# Patient Record
Sex: MALE | Race: WHITE | NOT HISPANIC OR LATINO | Employment: OTHER | URBAN - METROPOLITAN AREA
[De-identification: names, ages, dates, MRNs, and addresses within clinical notes are randomized per-mention and may not be internally consistent; named-entity substitution may affect disease eponyms.]

---

## 2020-12-29 ENCOUNTER — OFFICE VISIT (OUTPATIENT)
Dept: FAMILY MEDICINE | Facility: CLINIC | Age: 75
End: 2020-12-29
Payer: COMMERCIAL

## 2020-12-29 VITALS
TEMPERATURE: 97.9 F | SYSTOLIC BLOOD PRESSURE: 170 MMHG | HEIGHT: 72 IN | WEIGHT: 215 LBS | DIASTOLIC BLOOD PRESSURE: 98 MMHG | BODY MASS INDEX: 29.12 KG/M2 | OXYGEN SATURATION: 94 % | HEART RATE: 144 BPM

## 2020-12-29 DIAGNOSIS — Z13.6 SCREENING FOR AAA (ABDOMINAL AORTIC ANEURYSM): ICD-10-CM

## 2020-12-29 DIAGNOSIS — Z12.5 SCREENING FOR PROSTATE CANCER: ICD-10-CM

## 2020-12-29 DIAGNOSIS — Z51.81 MEDICATION MONITORING ENCOUNTER: ICD-10-CM

## 2020-12-29 DIAGNOSIS — Z11.59 NEED FOR HEPATITIS C SCREENING TEST: ICD-10-CM

## 2020-12-29 DIAGNOSIS — E78.5 HYPERLIPIDEMIA LDL GOAL <130: ICD-10-CM

## 2020-12-29 DIAGNOSIS — Z00.00 ROUTINE GENERAL MEDICAL EXAMINATION AT A HEALTH CARE FACILITY: ICD-10-CM

## 2020-12-29 DIAGNOSIS — Z13.220 SCREENING FOR HYPERLIPIDEMIA: ICD-10-CM

## 2020-12-29 DIAGNOSIS — Z12.11 SCREEN FOR COLON CANCER: ICD-10-CM

## 2020-12-29 DIAGNOSIS — L40.9 PSORIASIS: ICD-10-CM

## 2020-12-29 DIAGNOSIS — Z01.818 PREOP GENERAL PHYSICAL EXAM: Primary | ICD-10-CM

## 2020-12-29 DIAGNOSIS — I10 HYPERTENSION GOAL BP (BLOOD PRESSURE) < 140/90: ICD-10-CM

## 2020-12-29 LAB
ALBUMIN UR-MCNC: NEGATIVE MG/DL
APPEARANCE UR: CLEAR
BILIRUB UR QL STRIP: NEGATIVE
COLOR UR AUTO: YELLOW
ERYTHROCYTE [DISTWIDTH] IN BLOOD BY AUTOMATED COUNT: 13.3 % (ref 10–15)
GLUCOSE UR STRIP-MCNC: NEGATIVE MG/DL
HBA1C MFR BLD: 5.9 % (ref 0–5.6)
HCT VFR BLD AUTO: 53.5 % (ref 40–53)
HGB BLD-MCNC: 17.7 G/DL (ref 13.3–17.7)
HGB UR QL STRIP: NEGATIVE
KETONES UR STRIP-MCNC: NEGATIVE MG/DL
LEUKOCYTE ESTERASE UR QL STRIP: NEGATIVE
MCH RBC QN AUTO: 30.7 PG (ref 26.5–33)
MCHC RBC AUTO-ENTMCNC: 33.1 G/DL (ref 31.5–36.5)
MCV RBC AUTO: 93 FL (ref 78–100)
NITRATE UR QL: NEGATIVE
PH UR STRIP: 5.5 PH (ref 5–7)
PLATELET # BLD AUTO: 252 10E9/L (ref 150–450)
RBC # BLD AUTO: 5.77 10E12/L (ref 4.4–5.9)
SOURCE: NORMAL
SP GR UR STRIP: >1.03 (ref 1–1.03)
UROBILINOGEN UR STRIP-ACNC: 0.2 EU/DL (ref 0.2–1)
WBC # BLD AUTO: 8.1 10E9/L (ref 4–11)

## 2020-12-29 PROCEDURE — 82043 UR ALBUMIN QUANTITATIVE: CPT | Performed by: FAMILY MEDICINE

## 2020-12-29 PROCEDURE — 83036 HEMOGLOBIN GLYCOSYLATED A1C: CPT | Performed by: FAMILY MEDICINE

## 2020-12-29 PROCEDURE — 81003 URINALYSIS AUTO W/O SCOPE: CPT | Performed by: FAMILY MEDICINE

## 2020-12-29 PROCEDURE — 80053 COMPREHEN METABOLIC PANEL: CPT | Performed by: FAMILY MEDICINE

## 2020-12-29 PROCEDURE — 82550 ASSAY OF CK (CPK): CPT | Performed by: FAMILY MEDICINE

## 2020-12-29 PROCEDURE — G0103 PSA SCREENING: HCPCS | Performed by: FAMILY MEDICINE

## 2020-12-29 PROCEDURE — 93000 ELECTROCARDIOGRAM COMPLETE: CPT | Performed by: FAMILY MEDICINE

## 2020-12-29 PROCEDURE — 84443 ASSAY THYROID STIM HORMONE: CPT | Performed by: FAMILY MEDICINE

## 2020-12-29 PROCEDURE — 80061 LIPID PANEL: CPT | Performed by: FAMILY MEDICINE

## 2020-12-29 PROCEDURE — 99204 OFFICE O/P NEW MOD 45 MIN: CPT | Performed by: FAMILY MEDICINE

## 2020-12-29 PROCEDURE — 85027 COMPLETE CBC AUTOMATED: CPT | Performed by: FAMILY MEDICINE

## 2020-12-29 PROCEDURE — 36415 COLL VENOUS BLD VENIPUNCTURE: CPT | Performed by: FAMILY MEDICINE

## 2020-12-29 PROCEDURE — 86803 HEPATITIS C AB TEST: CPT | Performed by: FAMILY MEDICINE

## 2020-12-29 RX ORDER — SODIUM PHOSPHATE,MONO-DIBASIC 19G-7G/118
1 ENEMA (ML) RECTAL DAILY
COMMUNITY

## 2020-12-29 RX ORDER — LISINOPRIL AND HYDROCHLOROTHIAZIDE 12.5; 2 MG/1; MG/1
1 TABLET ORAL DAILY
Qty: 30 TABLET | Refills: 1 | Status: SHIPPED | OUTPATIENT
Start: 2020-12-29 | End: 2020-12-29

## 2020-12-29 RX ORDER — TRIAMCINOLONE ACETONIDE 5 MG/G
OINTMENT TOPICAL 2 TIMES DAILY
COMMUNITY
End: 2021-03-03

## 2020-12-29 RX ORDER — FENOFIBRATE 160 MG/1
TABLET ORAL
COMMUNITY
Start: 2020-05-08 | End: 2020-12-29

## 2020-12-29 RX ORDER — ATORVASTATIN CALCIUM 40 MG/1
40 TABLET, FILM COATED ORAL DAILY
Qty: 90 TABLET | Refills: 3 | Status: SHIPPED | OUTPATIENT
Start: 2020-12-29 | End: 2021-03-03

## 2020-12-29 RX ORDER — LISINOPRIL AND HYDROCHLOROTHIAZIDE 12.5; 2 MG/1; MG/1
1 TABLET ORAL DAILY
Qty: 90 TABLET | Refills: 3 | Status: SHIPPED | OUTPATIENT
Start: 2020-12-29 | End: 2021-01-21

## 2020-12-29 RX ORDER — FENOFIBRATE 160 MG/1
160 TABLET ORAL DAILY
Qty: 90 TABLET | Refills: 3 | Status: SHIPPED | OUTPATIENT
Start: 2020-12-29 | End: 2021-03-03

## 2020-12-29 RX ORDER — LISINOPRIL 20 MG/1
TABLET ORAL
COMMUNITY
Start: 2020-05-08 | End: 2020-12-29

## 2020-12-29 RX ORDER — ATORVASTATIN CALCIUM 40 MG/1
TABLET, FILM COATED ORAL
COMMUNITY
Start: 2020-04-12 | End: 2020-12-29

## 2020-12-29 SDOH — ECONOMIC STABILITY: FOOD INSECURITY: WITHIN THE PAST 12 MONTHS, THE FOOD YOU BOUGHT JUST DIDN'T LAST AND YOU DIDN'T HAVE MONEY TO GET MORE.: NOT ASKED

## 2020-12-29 SDOH — HEALTH STABILITY: MENTAL HEALTH: HOW OFTEN DO YOU HAVE A DRINK CONTAINING ALCOHOL?: NOT ASKED

## 2020-12-29 SDOH — ECONOMIC STABILITY: FOOD INSECURITY: WITHIN THE PAST 12 MONTHS, YOU WORRIED THAT YOUR FOOD WOULD RUN OUT BEFORE YOU GOT MONEY TO BUY MORE.: NOT ASKED

## 2020-12-29 SDOH — ECONOMIC STABILITY: TRANSPORTATION INSECURITY
IN THE PAST 12 MONTHS, HAS THE LACK OF TRANSPORTATION KEPT YOU FROM MEDICAL APPOINTMENTS OR FROM GETTING MEDICATIONS?: NOT ASKED

## 2020-12-29 SDOH — HEALTH STABILITY: MENTAL HEALTH: HOW OFTEN DO YOU HAVE 6 OR MORE DRINKS ON ONE OCCASION?: NOT ASKED

## 2020-12-29 SDOH — ECONOMIC STABILITY: INCOME INSECURITY: HOW HARD IS IT FOR YOU TO PAY FOR THE VERY BASICS LIKE FOOD, HOUSING, MEDICAL CARE, AND HEATING?: NOT ASKED

## 2020-12-29 SDOH — ECONOMIC STABILITY: TRANSPORTATION INSECURITY
IN THE PAST 12 MONTHS, HAS LACK OF TRANSPORTATION KEPT YOU FROM MEETINGS, WORK, OR FROM GETTING THINGS NEEDED FOR DAILY LIVING?: NOT ASKED

## 2020-12-29 SDOH — HEALTH STABILITY: MENTAL HEALTH: HOW MANY STANDARD DRINKS CONTAINING ALCOHOL DO YOU HAVE ON A TYPICAL DAY?: NOT ASKED

## 2020-12-29 ASSESSMENT — MIFFLIN-ST. JEOR: SCORE: 1753.23

## 2020-12-29 NOTE — PROGRESS NOTES
[address] [address]  Phone: [phone]  Primary Provider: Jalen Daly  Pre-op Performing Provider: JALEN DALY    PREOPERATIVE EVALUATION:  Today's date: 12/29/2020    Terry Verduzco is a 75 year old male who presents for a preoperative evaluation.    Surgical Information:  Surgery/Procedure: left Vitrectomy  Surgery Location:   Surgeon: [facility]  Surgery Date: 1/4/21  Time of Surgery: 6am  Where patient plans to recover: At home with family  Fax number for surgical facility: [phone]    Type of Anesthesia Anticipated: Local with MAC    Subjective     HPI related to upcoming procedure:     Left eye distorted vision with pin hole shaped no vision    Preop Questions 12/29/2020   1. Have you ever had a heart attack or stroke? No   2. Have you ever had surgery on your heart or blood vessels, such as a stent placement, a coronary artery bypass, or surgery on an artery in your head, neck, heart, or legs? No   3. Do you have chest pain with activity? No   4. Do you have a history of  heart failure? No   5. Do you currently have a cold, bronchitis or symptoms of other infection? No   6. Do you have a cough, shortness of breath, or wheezing? No   7. Do you or anyone in your family have previous history of blood clots? No   8. Do you or does anyone in your family have a serious bleeding problem such as prolonged bleeding following surgeries or cuts? No   9. Have you ever had problems with anemia or been told to take iron pills? No   10. Have you had any abnormal blood loss such as black, tarry or bloody stools? No   11. Have you ever had a blood transfusion? No   12. Are you willing to have a blood transfusion if it is medically needed before, during, or after your surgery? Yes   13. Have you or any of your relatives ever had problems with anesthesia? No   14. Do you have sleep apnea, excessive snoring or daytime drowsiness? No   15.  Do you have any artifical heart valves or other implanted medical devices like a pacemaker, defibrillator, or continuous glucose monitor? No   16. Do you have artificial joints? No   17. Are you allergic to latex? No       Health Care Directive:  Patient does not have a Health Care Directive or Living Will: pending    Preoperative Review of :   reviewed - no concerns      Htn    BP Readings from Last 3 Encounters:   12/29/20 (!) 170/98     Out of meds for 3 weeks    Lipids    Pending    Psoriasis     Controlled      Review of Systems  CONSTITUTIONAL: NEGATIVE for fever, chills, change in weight  INTEGUMENTARY/SKIN: NEGATIVE for worrisome rashes, moles or lesions  EYES: NEGATIVE for vision changes or irritation  ENT/MOUTH: NEGATIVE for ear, mouth and throat problems  RESP: NEGATIVE for significant cough or SOB  CV: NEGATIVE for chest pain, palpitations or peripheral edema  GI: NEGATIVE for nausea, abdominal pain, heartburn, or change in bowel habits  : NEGATIVE for frequency, dysuria, or hematuria  MUSCULOSKELETAL: NEGATIVE for significant arthralgias or myalgia  NEURO: NEGATIVE for weakness, dizziness or paresthesias  ENDOCRINE: NEGATIVE for temperature intolerance, skin/hair changes  HEME: NEGATIVE for bleeding problems  PSYCHIATRIC: NEGATIVE for changes in mood or affect    Patient Active Problem List    Diagnosis Date Noted     Hypertension goal BP (blood pressure) < 140/90      Priority: Medium     Psoriasis      Priority: Medium     Hyperlipidemia LDL goal <130      Priority: Medium      Past Medical History:   Diagnosis Date     Hyperlipidemia LDL goal <130      Hypertension goal BP (blood pressure) < 140/90      Psoriasis      Past Surgical History:   Procedure Laterality Date     SURGICAL HISTORY OF -  Right 1982    right inguinal hernia     SURGICAL HISTORY OF -   1985    cholecystectomy     SURGICAL HISTORY OF -  Left 1964    left testicle removed secondary to injury     Current Outpatient  Medications   Medication Sig Dispense Refill     atorvastatin (LIPITOR) 40 MG tablet Take 1 tablet (40 mg) by mouth daily 90 tablet 3     cholecalciferol (VITAMIN D3) 25 mcg (1000 units) capsule Take 1 capsule by mouth daily       fenofibrate (TRIGLIDE/LOFIBRA) 160 MG tablet Take 1 tablet (160 mg) by mouth daily 90 tablet 3     glucosamine-chondroitin 500-400 MG CAPS per capsule Take 1 capsule by mouth daily       lisinopril-hydrochlorothiazide (ZESTORETIC) 20-12.5 MG tablet Take 1 tablet by mouth daily 90 tablet 3     triamcinolone (KENALOG) 0.5 % external ointment Apply topically 2 times daily         Allergies   Allergen Reactions     Codeine      Tachycardio     Demerol [Meperidine] Rash        Social History     Tobacco Use     Smoking status: Never Smoker     Smokeless tobacco: Never Used   Substance Use Topics     Alcohol use: Yes     Alcohol/week: 1.0 - 2.0 standard drinks     Types: 1 - 2 Standard drinks or equivalent per week     Comment: 1 per week     Family History   Problem Relation Age of Onset     Hypertension Mother      Parkinsonism Father      Parkinsonism Brother      Dementia Maternal Grandfather      Cancer Brother         eye cancer, blastomycosis     Cerebrovascular Disease Brother         bike accident in MVA     History   Drug Use Unknown         Objective     BP (!) 170/98   Pulse 144   Temp 97.9  F (36.6  C) (Tympanic)   Ht 1.829 m (6')   Wt 97.5 kg (215 lb)   SpO2 94%   BMI 29.16 kg/m      Physical Exam    GENERAL APPEARANCE: healthy, alert and no distress     EYES: EOMI,  PERRL     HENT: ear canals and TM's normal and nose and mouth without ulcers or lesions     NECK: no adenopathy, no asymmetry, masses, or scars and thyroid normal to palpation     RESP: lungs clear to auscultation - no rales, rhonchi or wheezes     CV: regular rates and rhythm, normal S1 S2, no S3 or S4 and no murmur, click or rub     ABDOMEN:  soft, nontender, no HSM or masses and bowel sounds normal     MS:  extremities normal- no gross deformities noted, no evidence of inflammation in joints, FROM in all extremities.     SKIN: no suspicious lesions or rashes     NEURO: Normal strength and tone, sensory exam grossly normal, mentation intact and speech normal     PSYCH: mentation appears normal. and affect normal/bright     LYMPHATICS: No cervical adenopathy    No results for input(s): HGB, PLT, INR, NA, POTASSIUM, CR, A1C in the last 40245 hours.     Diagnostics:  Labs pending at this time.  Results will be reviewed when available.   EKG: Normal Sinus Rhythm, sinus tachycardia, short PA, slow R wave, normal axis, normal intervals, no acute ST/T changes c/w ischemia, no LVH by voltage criteria,  No SX    Revised Cardiac Risk Index (RCRI):  The patient has the following serious cardiovascular risks for perioperative complications:   - No serious cardiac risks = 0 points     RCRI Interpretation: 0 points: Class I (very low risk - 0.4% complication rate)           Assessment & Plan   The proposed surgical procedure is considered LOW risk.      ICD-10-CM    1. Preop general physical exam  Z01.818 REVIEW OF HEALTH MAINTENANCE PROTOCOL ORDERS     EKG 12-lead complete w/read - Clinics     CBC with platelets     Comprehensive metabolic panel     *UA reflex to Microscopic   2. Hypertension goal BP (blood pressure) < 140/90  I10 REVIEW OF HEALTH MAINTENANCE PROTOCOL ORDERS     Albumin Random Urine Quantitative with Creat Ratio     CBC with platelets     Comprehensive metabolic panel     lisinopril-hydrochlorothiazide (ZESTORETIC) 20-12.5 MG tablet     DISCONTINUED: lisinopril-hydrochlorothiazide (ZESTORETIC) 20-12.5 MG tablet   3. Hyperlipidemia LDL goal <130  E78.5 REVIEW OF HEALTH MAINTENANCE PROTOCOL ORDERS     Lipid panel reflex to direct LDL Fasting     Comprehensive metabolic panel     CK total     atorvastatin (LIPITOR) 40 MG tablet     fenofibrate (TRIGLIDE/LOFIBRA) 160 MG tablet   4. Psoriasis  L40.9 REVIEW OF HEALTH  MAINTENANCE PROTOCOL ORDERS   5. Medication monitoring encounter  Z51.81 REVIEW OF HEALTH MAINTENANCE PROTOCOL ORDERS     Lipid panel reflex to direct LDL Fasting     Albumin Random Urine Quantitative with Creat Ratio     Hemoglobin A1c     CBC with platelets     Comprehensive metabolic panel     *UA reflex to Microscopic     CK total     TSH with free T4 reflex   6. Routine general medical examination at a health care facility  Z00.00 REVIEW OF HEALTH MAINTENANCE PROTOCOL ORDERS     Lipid panel reflex to direct LDL Fasting     Albumin Random Urine Quantitative with Creat Ratio     Hemoglobin A1c     CBC with platelets     Comprehensive metabolic panel     *UA reflex to Microscopic     CK total     TSH with free T4 reflex   7. Screen for colon cancer  Z12.11 REVIEW OF HEALTH MAINTENANCE PROTOCOL ORDERS     Fecal colorectal cancer screen FIT - Future (S+30)     Fecal colorectal cancer screen FIT   8. Need for hepatitis C screening test  Z11.59 REVIEW OF HEALTH MAINTENANCE PROTOCOL ORDERS     Hepatitis C Screen Reflex to HCV RNA Quant and Genotype   9. Screening for hyperlipidemia  Z13.220 REVIEW OF HEALTH MAINTENANCE PROTOCOL ORDERS   10. Screening for AAA (abdominal aortic aneurysm)  Z13.6 REVIEW OF HEALTH MAINTENANCE PROTOCOL ORDERS     Abdominal Aortic Aneurysm Screening/Tracking   11. Screening for prostate cancer  Z12.5 REVIEW OF HEALTH MAINTENANCE PROTOCOL ORDERS     Prostate spec antigen screen     meds refilled  Recheck BP/ECG in 2 days  If improved, ok for surgery       Risks and Recommendations:  The patient has the following additional risks and recommendations for perioperative complications:   - No identified additional risk factors other than previously addressed    Medication Instructions:  Patient is to take all scheduled medications on the day of surgery    RECOMMENDATION:  APPROVAL GIVEN to proceed with proposed procedure, without further diagnostic evaluation.           Jalen Daly MD, FAAFP     M  [facility]  [address] [place] [id number]  [email or web address]   Office: [phone]  Fax: [phone]  Pager: [phone]         Copy of this evaluation report is provided to requesting physician.    Amparoop Robert    M [facility] Preop Guidelines    Revised Cardiac Risk Index

## 2020-12-30 DIAGNOSIS — Z12.11 SCREEN FOR COLON CANCER: ICD-10-CM

## 2020-12-30 LAB
ALBUMIN SERPL-MCNC: 3.9 G/DL (ref 3.4–5)
ALP SERPL-CCNC: 119 U/L (ref 40–150)
ALT SERPL W P-5'-P-CCNC: 51 U/L (ref 0–70)
ANION GAP SERPL CALCULATED.3IONS-SCNC: 5 MMOL/L (ref 3–14)
AST SERPL W P-5'-P-CCNC: 38 U/L (ref 0–45)
BILIRUB SERPL-MCNC: 2.2 MG/DL (ref 0.2–1.3)
BUN SERPL-MCNC: 25 MG/DL (ref 7–30)
CALCIUM SERPL-MCNC: 9.4 MG/DL (ref 8.5–10.1)
CHLORIDE SERPL-SCNC: 109 MMOL/L (ref 94–109)
CHOLEST SERPL-MCNC: 180 MG/DL
CK SERPL-CCNC: 92 U/L (ref 30–300)
CO2 SERPL-SCNC: 25 MMOL/L (ref 20–32)
CREAT SERPL-MCNC: 1.34 MG/DL (ref 0.66–1.25)
CREAT UR-MCNC: 331 MG/DL
GFR SERPL CREATININE-BSD FRML MDRD: 52 ML/MIN/{1.73_M2}
GLUCOSE SERPL-MCNC: 126 MG/DL (ref 70–99)
HCV AB SERPL QL IA: NONREACTIVE
HDLC SERPL-MCNC: 37 MG/DL
LDLC SERPL CALC-MCNC: 92 MG/DL
MICROALBUMIN UR-MCNC: 18 MG/L
MICROALBUMIN/CREAT UR: 5.53 MG/G CR (ref 0–17)
NONHDLC SERPL-MCNC: 143 MG/DL
POTASSIUM SERPL-SCNC: 4.2 MMOL/L (ref 3.4–5.3)
PROT SERPL-MCNC: 8 G/DL (ref 6.8–8.8)
PSA SERPL-ACNC: 2.72 UG/L (ref 0–4)
SODIUM SERPL-SCNC: 139 MMOL/L (ref 133–144)
TRIGL SERPL-MCNC: 254 MG/DL
TSH SERPL DL<=0.005 MIU/L-ACNC: 1.56 MU/L (ref 0.4–4)

## 2020-12-30 PROCEDURE — 82274 ASSAY TEST FOR BLOOD FECAL: CPT | Performed by: FAMILY MEDICINE

## 2020-12-31 ENCOUNTER — ALLIED HEALTH/NURSE VISIT (OUTPATIENT)
Dept: NURSING | Facility: CLINIC | Age: 75
End: 2020-12-31
Payer: COMMERCIAL

## 2020-12-31 ENCOUNTER — APPOINTMENT (OUTPATIENT)
Dept: GENERAL RADIOLOGY | Facility: CLINIC | Age: 75
End: 2020-12-31
Payer: COMMERCIAL

## 2020-12-31 VITALS
DIASTOLIC BLOOD PRESSURE: 98 MMHG | RESPIRATION RATE: 16 BRPM | OXYGEN SATURATION: 98 % | SYSTOLIC BLOOD PRESSURE: 155 MMHG | HEART RATE: 103 BPM

## 2020-12-31 DIAGNOSIS — Z01.818 PRE-OP EXAM: ICD-10-CM

## 2020-12-31 DIAGNOSIS — Z01.30 BP CHECK: ICD-10-CM

## 2020-12-31 DIAGNOSIS — I10 HYPERTENSION GOAL BP (BLOOD PRESSURE) < 140/90: Primary | ICD-10-CM

## 2020-12-31 PROCEDURE — 93000 ELECTROCARDIOGRAM COMPLETE: CPT

## 2020-12-31 RX ORDER — METOPROLOL SUCCINATE 50 MG/1
50 TABLET, EXTENDED RELEASE ORAL DAILY
Qty: 30 TABLET | Refills: 1 | Status: SHIPPED | OUTPATIENT
Start: 2020-12-31 | End: 2021-02-21

## 2020-12-31 NOTE — PROGRESS NOTES
Terry Verduzco is being followed for Blood Pressure management.      BP Readings from Last 3 Encounters:   12/31/20 (!) 155/98   12/29/20 (!) 170/98       Wt Readings from Last 2 Encounters:   12/29/20 97.5 kg (215 lb)       Is pulse 55 or greater? - Yes    Pulse Readings from Last 1 Encounters:   12/31/20 103       Current blood pressure medication(s):  Current Outpatient Medications   Medication Sig Dispense Refill     atorvastatin (LIPITOR) 40 MG tablet Take 1 tablet (40 mg) by mouth daily 90 tablet 3     cholecalciferol (VITAMIN D3) 25 mcg (1000 units) capsule Take 1 capsule by mouth daily       fenofibrate (TRIGLIDE/LOFIBRA) 160 MG tablet Take 1 tablet (160 mg) by mouth daily 90 tablet 3     glucosamine-chondroitin 500-400 MG CAPS per capsule Take 1 capsule by mouth daily       lisinopril-hydrochlorothiazide (ZESTORETIC) 20-12.5 MG tablet Take 1 tablet by mouth daily 90 tablet 3     triamcinolone (KENALOG) 0.5 % external ointment Apply topically 2 times daily            1. Follow up instructions include:     Next Provider visit: Follow up in 3 months..      SUBJECTIVE:                                                    The patient is taking medication as prescribed and is tolerating well.   Patient is not monitoring Blood Pressure at home.       Pt returning for BP follow up. Pt needing recheck of EKG as well.    Out of the following complicating factors: Cough, Headache, Lightheadedness, Shortness of breath, Fatigue, Nausea, Sexual Dysfunction, New onset of swelling or edema, Weakness and New onset of Chest Pain, the patient reports:  None    OBJECTIVE:                                                      Today's BP completed using cuff size: regular on left side  arm.      Potassium   Date Value Ref Range Status   12/29/2020 4.2 3.4 - 5.3 mmol/L Final     Creatinine   Date Value Ref Range Status   12/29/2020 1.34 (H) 0.66 - 1.25 mg/dL Final     Urea Nitrogen   Date Value Ref Range Status   12/29/2020 25 7 -  30 mg/dL Final     GFR Estimate   Date Value Ref Range Status   12/29/2020 52 (L) >60 mL/min/[1.73_m2] Final     Comment:     Non  GFR Calc  Starting 12/18/2018, serum creatinine based estimated GFR (eGFR) will be   calculated using the Chronic Kidney Disease Epidemiology Collaboration   (CKD-EPI) equation.       Pt was NOT WNL. Pt was still tachycardic. Apical Pulse taken at 103 BPM irregular irregular. Writer noted a few abnormal beats during the 60 seconds of auscultation.   BP was still hypertensive though is better than previous.   EKG was abnormal.    Routing to PCP to review and advise.        Education:  general discussion/verbal explanation  Ways to help improve BP/HTN:   Medications  Lose weight  Diet low in fat and rich in fruits, vegetables and low fat dairy products  Reduce salt in diet  Do something active for at least 30 minutes a day on most days of the week  Cut down on alcohol (if you drink more than 2 drinks per day)  Decrease stress (exercise, read, yoga, meditation, time for self, etc.)   Patient was given an opportunity to ask questions.    Patient verbalized understanding of this plan and is agreeable.    Juan F Shaikh RN

## 2020-12-31 NOTE — PROGRESS NOTES
Message handled by Nurse Triage with [facility] - provider name: Dr. Daly - advise metoprolol succinate 50mg daily #30 x 1 refill. Recheck BP in 1 week. OK for surgery. Fax EKG to surgery center.    Called patient @ [phone] -     Advised of PCP recommendation - Patient stated an understanding and agreed with plan.  Rx sent    EKG faxed      Suzanna Bang RN  [facility]

## 2020-12-31 NOTE — PROGRESS NOTES
Reviewed with Sharee ABARCA    Metoprolol succinate 50 mg daily, #30, r x 1  Recheck BP in 1 week  Fax ECG to [facility], improved, but still mild tachycardia    BP Readings from Last 3 Encounters:   12/31/20 (!) 155/98   12/29/20 (!) 170/98

## 2021-01-01 PROBLEM — R73.03 PREDIABETES: Status: ACTIVE | Noted: 2021-01-01

## 2021-01-01 LAB — HEMOCCULT STL QL IA: NEGATIVE

## 2021-01-11 ENCOUNTER — ALLIED HEALTH/NURSE VISIT (OUTPATIENT)
Dept: NURSING | Facility: CLINIC | Age: 76
End: 2021-01-11
Payer: COMMERCIAL

## 2021-01-11 VITALS — DIASTOLIC BLOOD PRESSURE: 88 MMHG | OXYGEN SATURATION: 96 % | SYSTOLIC BLOOD PRESSURE: 130 MMHG | HEART RATE: 99 BPM

## 2021-01-11 DIAGNOSIS — I10 HYPERTENSION GOAL BP (BLOOD PRESSURE) < 140/90: Primary | ICD-10-CM

## 2021-01-11 NOTE — PROGRESS NOTES
Hypertension Follow-up      Do you check your blood pressure regularly outside of the clinic? Yes  - JUST STARTED TODAY     Are you following a low salt diet? Yes    Are your blood pressures ever more than 140 on the top number (systolic) OR more   than 90 on the bottom number (diastolic), for example 140/90? Yes       How many servings of fruits and vegetables do you eat daily?  0-1    On average, how many sweetened beverages do you drink each day (Examples: soda, juice, sweet tea, etc.  Do NOT count diet or artificially sweetened beverages)?   0    How many days per week do you exercise enough to make your heart beat faster? 5    How many minutes a day do you exercise enough to make your heart beat faster? 20 - 29    How many days per week do you miss taking your medication? 0       Denies: CP, SOB, headaches, blurred vision, N/V, numbness or tingling.       BP Readings from Last 3 Encounters:   01/11/21 130/88   12/31/20 (!) 155/98   12/29/20 (!) 170/98         Reviewed diet and exercise with pt     Patient stated an understanding and agreed with plan.    Harini Lafleur RN, BSN  [facility]

## 2021-01-11 NOTE — PROGRESS NOTES
Next 5 appointments (look out 90 days)    Mar 03, 2021  9:30 AM  PHYSICAL with Jalen Daly MD  [facility] ([facility]) [address] [place] [id number] [phone]        Juan F CORRALES RN   [facility] Triage

## 2021-01-11 NOTE — PROGRESS NOTES
Advise follow up visit soon    BP Readings from Last 3 Encounters:   01/11/21 130/88   12/31/20 (!) 155/98   12/29/20 (!) 170/98     Creatinine   Date Value Ref Range Status   12/29/2020 1.34 (H) 0.66 - 1.25 mg/dL Final     GFR Estimate   Date Value Ref Range Status   12/29/2020 52 (L) >60 mL/min/[1.73_m2] Final     Comment:     Non  GFR Calc  Starting 12/18/2018, serum creatinine based estimated GFR (eGFR) will be   calculated using the Chronic Kidney Disease Epidemiology Collaboration   (CKD-EPI) equation.

## 2021-01-20 DIAGNOSIS — I10 HYPERTENSION GOAL BP (BLOOD PRESSURE) < 140/90: ICD-10-CM

## 2021-01-21 RX ORDER — LISINOPRIL AND HYDROCHLOROTHIAZIDE 12.5; 2 MG/1; MG/1
TABLET ORAL
Qty: 30 TABLET | Refills: 1 | Status: SHIPPED | OUTPATIENT
Start: 2021-01-21 | End: 2021-03-03

## 2021-01-21 NOTE — TELEPHONE ENCOUNTER
rx done, advise visit with myself, BP    BP Readings from Last 3 Encounters:   01/11/21 130/88   12/31/20 (!) 155/98   12/29/20 (!) 170/98     Creatinine   Date Value Ref Range Status   12/29/2020 1.34 (H) 0.66 - 1.25 mg/dL Final

## 2021-01-21 NOTE — TELEPHONE ENCOUNTER
Creatinine   Date Value Ref Range Status   12/29/2020 1.34 (H) 0.66 - 1.25 mg/dL Final     Result Note:   Prediabetes, teetering on diabetes  Mildly decreased kidney function  Mildly increased bilirubin   Elevated triglycerides  Low HDL (good cholesterol)     We advise:     Please proceed with surgery     Continue current cares.  Balanced low cholesterol diet.  Regular exercise.     Close follow up      Routing refill request to provider for review/approval because:  Labs out of range:  Creatinine   Do you want a Follow-up lab draw?        Suzanna Bang RN  [facility]

## 2021-01-22 DIAGNOSIS — I10 HYPERTENSION GOAL BP (BLOOD PRESSURE) < 140/90: ICD-10-CM

## 2021-01-25 RX ORDER — METOPROLOL SUCCINATE 50 MG/1
TABLET, EXTENDED RELEASE ORAL
Qty: 30 TABLET | Refills: 1 | OUTPATIENT
Start: 2021-01-25

## 2021-01-25 NOTE — TELEPHONE ENCOUNTER
#30 x 1 refills sent 12/31/20  No pharmacy change      Suzanna Bang RN  [facility]

## 2021-02-20 DIAGNOSIS — I10 HYPERTENSION GOAL BP (BLOOD PRESSURE) < 140/90: ICD-10-CM

## 2021-02-21 RX ORDER — METOPROLOL SUCCINATE 50 MG/1
TABLET, EXTENDED RELEASE ORAL
Qty: 30 TABLET | Refills: 1 | Status: SHIPPED | OUTPATIENT
Start: 2021-02-21 | End: 2021-03-03

## 2021-02-21 NOTE — TELEPHONE ENCOUNTER
BP Readings from Last 3 Encounters:   01/11/21 130/88   12/31/20 (!) 155/98   12/29/20 (!) 170/98     rx done, advise med check

## 2021-03-03 ENCOUNTER — OFFICE VISIT (OUTPATIENT)
Dept: FAMILY MEDICINE | Facility: CLINIC | Age: 76
End: 2021-03-03
Payer: COMMERCIAL

## 2021-03-03 VITALS — SYSTOLIC BLOOD PRESSURE: 128 MMHG | DIASTOLIC BLOOD PRESSURE: 79 MMHG

## 2021-03-03 DIAGNOSIS — Z12.5 SCREENING FOR PROSTATE CANCER: ICD-10-CM

## 2021-03-03 DIAGNOSIS — Z12.11 SPECIAL SCREENING FOR MALIGNANT NEOPLASMS, COLON: ICD-10-CM

## 2021-03-03 DIAGNOSIS — Z51.81 MEDICATION MONITORING ENCOUNTER: ICD-10-CM

## 2021-03-03 DIAGNOSIS — I10 HYPERTENSION GOAL BP (BLOOD PRESSURE) < 140/90: ICD-10-CM

## 2021-03-03 DIAGNOSIS — E78.5 HYPERLIPIDEMIA LDL GOAL <100: ICD-10-CM

## 2021-03-03 DIAGNOSIS — L40.9 PSORIASIS: ICD-10-CM

## 2021-03-03 DIAGNOSIS — Z00.00 ENCOUNTER FOR MEDICARE ANNUAL WELLNESS EXAM: Primary | ICD-10-CM

## 2021-03-03 DIAGNOSIS — R73.03 PREDIABETES: ICD-10-CM

## 2021-03-03 DIAGNOSIS — M15.0 PRIMARY OSTEOARTHRITIS INVOLVING MULTIPLE JOINTS: ICD-10-CM

## 2021-03-03 LAB
ALBUMIN UR-MCNC: NEGATIVE MG/DL
APPEARANCE UR: CLEAR
BILIRUB UR QL STRIP: NEGATIVE
COLOR UR AUTO: YELLOW
ERYTHROCYTE [DISTWIDTH] IN BLOOD BY AUTOMATED COUNT: 12.6 % (ref 10–15)
GLUCOSE UR STRIP-MCNC: NEGATIVE MG/DL
HBA1C MFR BLD: 6 % (ref 0–5.6)
HCT VFR BLD AUTO: 49.3 % (ref 40–53)
HGB BLD-MCNC: 16.4 G/DL (ref 13.3–17.7)
HGB UR QL STRIP: ABNORMAL
KETONES UR STRIP-MCNC: NEGATIVE MG/DL
LEUKOCYTE ESTERASE UR QL STRIP: NEGATIVE
MCH RBC QN AUTO: 31.2 PG (ref 26.5–33)
MCHC RBC AUTO-ENTMCNC: 33.3 G/DL (ref 31.5–36.5)
MCV RBC AUTO: 94 FL (ref 78–100)
NITRATE UR QL: NEGATIVE
PH UR STRIP: 5.5 PH (ref 5–7)
PLATELET # BLD AUTO: 291 10E9/L (ref 150–450)
RBC # BLD AUTO: 5.25 10E12/L (ref 4.4–5.9)
RBC #/AREA URNS AUTO: NORMAL /HPF
SOURCE: ABNORMAL
SP GR UR STRIP: >1.03 (ref 1–1.03)
UROBILINOGEN UR STRIP-ACNC: 0.2 EU/DL (ref 0.2–1)
WBC # BLD AUTO: 8.3 10E9/L (ref 4–11)
WBC #/AREA URNS AUTO: NORMAL /HPF

## 2021-03-03 PROCEDURE — 85027 COMPLETE CBC AUTOMATED: CPT | Performed by: FAMILY MEDICINE

## 2021-03-03 PROCEDURE — 83036 HEMOGLOBIN GLYCOSYLATED A1C: CPT | Performed by: FAMILY MEDICINE

## 2021-03-03 PROCEDURE — G0438 PPPS, INITIAL VISIT: HCPCS | Performed by: FAMILY MEDICINE

## 2021-03-03 PROCEDURE — 82550 ASSAY OF CK (CPK): CPT | Performed by: FAMILY MEDICINE

## 2021-03-03 PROCEDURE — 84443 ASSAY THYROID STIM HORMONE: CPT | Performed by: FAMILY MEDICINE

## 2021-03-03 PROCEDURE — 82043 UR ALBUMIN QUANTITATIVE: CPT | Performed by: FAMILY MEDICINE

## 2021-03-03 PROCEDURE — 80061 LIPID PANEL: CPT | Performed by: FAMILY MEDICINE

## 2021-03-03 PROCEDURE — G0103 PSA SCREENING: HCPCS | Performed by: FAMILY MEDICINE

## 2021-03-03 PROCEDURE — 36415 COLL VENOUS BLD VENIPUNCTURE: CPT | Performed by: FAMILY MEDICINE

## 2021-03-03 PROCEDURE — 81001 URINALYSIS AUTO W/SCOPE: CPT | Performed by: FAMILY MEDICINE

## 2021-03-03 PROCEDURE — 80053 COMPREHEN METABOLIC PANEL: CPT | Performed by: FAMILY MEDICINE

## 2021-03-03 RX ORDER — LISINOPRIL AND HYDROCHLOROTHIAZIDE 12.5; 2 MG/1; MG/1
1 TABLET ORAL DAILY
Qty: 90 TABLET | Refills: 3 | Status: SHIPPED | OUTPATIENT
Start: 2021-03-03 | End: 2022-03-25

## 2021-03-03 RX ORDER — FENOFIBRATE 160 MG/1
160 TABLET ORAL DAILY
Qty: 90 TABLET | Refills: 3 | Status: SHIPPED | OUTPATIENT
Start: 2021-03-03 | End: 2022-03-07

## 2021-03-03 RX ORDER — ATORVASTATIN CALCIUM 40 MG/1
40 TABLET, FILM COATED ORAL DAILY
Qty: 90 TABLET | Refills: 3 | Status: SHIPPED | OUTPATIENT
Start: 2021-03-03 | End: 2022-03-07

## 2021-03-03 RX ORDER — TRIAMCINOLONE ACETONIDE 5 MG/G
OINTMENT TOPICAL
Qty: 120 G | Refills: 3 | Status: SHIPPED | OUTPATIENT
Start: 2021-03-03 | End: 2021-07-06

## 2021-03-03 RX ORDER — METOPROLOL SUCCINATE 50 MG/1
50 TABLET, EXTENDED RELEASE ORAL DAILY
Qty: 90 TABLET | Refills: 3 | Status: SHIPPED | OUTPATIENT
Start: 2021-03-03 | End: 2021-03-16

## 2021-03-03 ASSESSMENT — ACTIVITIES OF DAILY LIVING (ADL): CURRENT_FUNCTION: NO ASSISTANCE NEEDED

## 2021-03-03 NOTE — PROGRESS NOTES
"[facility]    Terry Verduzco is a 75 year old male who presents for Preventive Visit.    Patient has been advised of split billing requirements and indicates understanding: Yes     Are you in the first 12 months of your Medicare coverage?  No    Healthy Habits:    In general, how would you rate your overall health?  Good    Frequency of exercise:  6-7 days/week (walking 1 mile a day)    Do you usually eat at least 4 servings of fruit and vegetables a day, include whole grains    & fiber and avoid regularly eating high fat or \"junk\" foods?  Yes    Taking medications regularly:  Yes    Barriers to taking medications:  None    Medication side effects:  None    Ability to successfully perform activities of daily living:  No assistance needed    Home Safety:  No safety concerns identified    Hearing Impairment:  No hearing concerns    In the past 6 months, have you been bothered by leaking of urine?  No    In general, how would you rate your overall mental or emotional health?  Very good      PHQ-2 Total Score:    Additional concerns today:  No    Do you feel safe in your environment? Yes    Have you ever done Advance Care Planning? (For example, a Health Directive, POLST, or a discussion with a medical provider or your loved ones about your wishes): No, advance care planning information given to patient to review.  Advanced care planning was discussed at today's visit.    Fall risk  Fallen 2 or more times in the past year?: No  Any fall with injury in the past year?: No    Cognitive Screening   1) Repeat 3 items (Leader, Season, Table)    2) Clock draw: NORMAL  3) 3 item recall: Recalls 1 object   Results: NORMAL clock, 1-2 items recalled: COGNITIVE IMPAIRMENT LESS LIKELY    Mini-CogTM Copyright BERTA Welch. Licensed by the author for use in [facility]; reprinted with permission (yakelin@.[email or web address]). All rights reserved.      Do you have sleep apnea, excessive snoring or daytime " drowsiness?: no    Reviewed and updated as needed this visit by clinical staff  Tobacco  Allergies  Meds   Med Hx  Surg Hx  Fam Hx  Soc Hx        Reviewed and updated as needed this visit by Provider                Social History     Tobacco Use     Smoking status: Never Smoker     Smokeless tobacco: Never Used   Substance Use Topics     Alcohol use: Not Currently     If you drink alcohol do you typically have >3 drinks per day or >7 drinks per week? No    Alcohol Use 3/3/2021   Prescreen: >3 drinks/day or >7 drinks/week? No     Current providers sharing in care for this patient include:   Patient Care Team:  Jalen Daly MD as PCP - General (Family Medicine)  Jalen Daly MD as Assigned PCP    The following health maintenance items are reviewed in Epic and correct as of today:  Health Maintenance   Topic Date Due     COVID-19 Vaccine (1 of 2) 06/11/1962     DTAP/TDAP/TD IMMUNIZATION (1 - Tdap) 06/11/1971     ZOSTER IMMUNIZATION (1 of 2) 06/11/1996     Pneumococcal Vaccine: 65+ Years (1 of 1 - PPSV23) 06/11/2011     ANNUAL REVIEW OF HM ORDERS  12/29/2021     FALL RISK ASSESSMENT  12/29/2021     COLORECTAL CANCER SCREENING  12/30/2021     MEDICARE ANNUAL WELLNESS VISIT  03/03/2022     LIPID  03/03/2026     ADVANCE CARE PLANNING  03/03/2026     HEPATITIS C SCREENING  Completed     PHQ-2  Completed     AORTIC ANEURYSM SCREENING (SYSTEM ASSIGNED)  Completed     INFLUENZA VACCINE  Addressed     Pneumococcal Vaccine: Pediatrics (0 to 5 Years) and At-Risk Patients (6 to 64 Years)  Aged Out     IPV IMMUNIZATION  Aged Out     MENINGITIS IMMUNIZATION  Aged Out     HEPATITIS B IMMUNIZATION  Aged Out     Left posterior vitrectomy - Dr Lees - 1/2021 - went well    Prediabetes    Glucose   Date Value Ref Range Status   12/29/2020 126 (H) 70 - 99 mg/dL Final     Comment:     Fasting specimen   12/27/2005 117 (H) 60 - 110 mg/dL Final     Lab Results   Component Value Date    A1C 6.0 03/03/2021    A1C 5.9 12/29/2020     Htn  "- BP's at home - 120's/upper 70's    BP Readings from Last 3 Encounters:   03/03/21 128/79   01/11/21 130/88   12/31/20 (!) 155/98     Creatinine   Date Value Ref Range Status   12/29/2020 1.34 (H) 0.66 - 1.25 mg/dL Final     GFR Estimate   Date Value Ref Range Status   12/29/2020 52 (L) >60 mL/min/[1.73_m2] Final     Comment:     Non  GFR Calc  Starting 12/18/2018, serum creatinine based estimated GFR (eGFR) will be   calculated using the Chronic Kidney Disease Epidemiology Collaboration   (CKD-EPI) equation.       Recent Labs   Lab Test 12/29/20  0947   CHOL 180   HDL 37*   LDL 92   TRIG 254*     OA - \"fine\"    Psoriasis - controlled    COVID - #1, 3/5/2021    Health Maintenance     Colonoscopy:  Due?   FIT:  given              PSA:  pending   DEXA:  NA    Health Maintenance Due   Topic Date Due     COVID-19 Vaccine (1 of 2) 06/11/1962     DTAP/TDAP/TD IMMUNIZATION (1 - Tdap) 06/11/1971     ZOSTER IMMUNIZATION (1 of 2) 06/11/1996     Pneumococcal Vaccine: 65+ Years (1 of 1 - PPSV23) 06/11/2011       Current Problem List    Patient Active Problem List   Diagnosis     Hypertension goal BP (blood pressure) < 140/90     Psoriasis     Hyperlipidemia LDL goal <100     Prediabetes     Primary osteoarthritis involving multiple joints       Past Medical History    Past Medical History:   Diagnosis Date     Hyperlipidemia LDL goal <100      Hypertension goal BP (blood pressure) < 140/90      Prediabetes 01/01/2021     Primary osteoarthritis involving multiple joints      Psoriasis        Past Surgical History    Past Surgical History:   Procedure Laterality Date     SURGICAL HISTORY OF -  Right 1982    right inguinal hernia     SURGICAL HISTORY OF -   1985    cholecystectomy     SURGICAL HISTORY OF -  Left 1964    left testicle removed secondary to injury     SURGICAL HISTORY OF -  Left 01/2021    Left eye - Dr Lees - Macular Hole and Epiretinal Membrane       Current Medications    Current Outpatient " Medications   Medication Sig Dispense Refill     atorvastatin (LIPITOR) 40 MG tablet Take 1 tablet (40 mg) by mouth daily 90 tablet 3     cholecalciferol (VITAMIN D3) 25 mcg (1000 units) capsule Take 1 capsule by mouth daily       fenofibrate (TRIGLIDE/LOFIBRA) 160 MG tablet Take 1 tablet (160 mg) by mouth daily 90 tablet 3     glucosamine-chondroitin 500-400 MG CAPS per capsule Take 1 capsule by mouth daily       lisinopril-hydrochlorothiazide (ZESTORETIC) 20-12.5 MG tablet Take 1 tablet by mouth daily 90 tablet 3     metoprolol succinate ER (TOPROL-XL) 50 MG 24 hr tablet Take 1 tablet (50 mg) by mouth daily 90 tablet 3     triamcinolone (KENALOG) 0.5 % external ointment Apply topically 2 times daily 120 g 3       Allergies    Allergies   Allergen Reactions     Codeine      Tachycardia     Demerol [Meperidine] Rash       Immunizations      There is no immunization history on file for this patient.    Family History    Family History   Problem Relation Age of Onset     Hypertension Mother      Parkinsonism Father      Parkinsonism Brother      Dementia Maternal Grandfather      Cancer Brother         eye cancer, blastomycosis     Cerebrovascular Disease Brother         bike accident in MVA       Social History    Social History     Socioeconomic History     Marital status:      Spouse name: Claritza     Number of children: 5     Years of education: 12     Highest education level: Not on file   Occupational History     Not on file   Social Needs     Financial resource strain: Not on file     Food insecurity     Worry: Not on file     Inability: Not on file     Transportation needs     Medical: Not on file     Non-medical: Not on file   Tobacco Use     Smoking status: Never Smoker     Smokeless tobacco: Never Used   Substance and Sexual Activity     Alcohol use: Not Currently     Drug use: Never     Sexual activity: Yes     Partners: Female   Lifestyle     Physical activity     Days per week: Not on file      Minutes per session: Not on file     Stress: Not on file   Relationships     Social connections     Talks on phone: Not on file     Gets together: Not on file     Attends Taoist service: Not on file     Active member of club or organization: Not on file     Attends meetings of clubs or organizations: Not on file     Relationship status: Not on file     Intimate partner violence     Fear of current or ex partner: Not on file     Emotionally abused: Not on file     Physically abused: Not on file     Forced sexual activity: Not on file   Other Topics Concern     Not on file   Social History Narrative     Not on file       ROS    CONSTITUTIONAL: NEGATIVE for fever, chills, change in weight  INTEGUMENTARY/SKIN: NEGATIVE for worrisome rashes, moles or lesions  EYES: NEGATIVE for vision changes or irritation  ENT/MOUTH: NEGATIVE for ear, mouth and throat problems  RESP: NEGATIVE for significant cough or SOB  BREAST: NEGATIVE for masses, tenderness or discharge  CV: NEGATIVE for chest pain, palpitations or peripheral edema  GI: NEGATIVE for nausea, abdominal pain, heartburn, or change in bowel habits  : NEGATIVE for frequency, dysuria, or hematuria  MUSCULOSKELETAL: NEGATIVE for significant arthralgias or myalgia  NEURO: NEGATIVE for weakness, dizziness or paresthesias  ENDOCRINE: NEGATIVE for temperature intolerance, skin/hair changes  HEME: NEGATIVE for bleeding problems  PSYCHIATRIC: NEGATIVE for changes in mood or affect    OBJECTIVE    /79  Estimated body mass index is 29.16 kg/m  as calculated from the following:    Height as of 12/29/20: 1.829 m (6').    Weight as of 12/29/20: 97.5 kg (215 lb).  EXAM:   GENERAL: healthy, alert and no distress  EYES: Eyes grossly normal to inspection, PERRL and conjunctivae and sclerae normal  HENT: ear canals and TM's normal, nose and mouth without ulcers or lesions  NECK: no adenopathy, no asymmetry, masses, or scars and thyroid normal to palpation  RESP: lungs clear to  auscultation - no rales, rhonchi or wheezes  CV: regular rate and rhythm, normal S1 S2, no S3 or S4, no murmur, click or rub, no peripheral edema and peripheral pulses strong  ABDOMEN: soft, nontender, no hepatosplenomegaly, no masses and bowel sounds normal   (male): no hernias, penis normal without urethral discharge and right testicle normal - left surgically absent  RECTAL: normal sphincter tone, no rectal masses, prostate of normal size for age, smooth, nontender without masses/nodules and prostate 1+ enlarged, nontender  MS: no gross musculoskeletal defects noted, no edema  NEURO: Normal strength and tone, mentation intact and speech normal  PSYCH: mentation appears normal, affect normal/bright  LYMPH: no cervical, supraclavicular, axillary, or inguinal adenopathy    DIAGNOSTICS/PROCEDURES    Pending    ASSESSMENT      ICD-10-CM    1. Encounter for Medicare annual wellness exam  Z00.00 Comprehensive metabolic panel     Lipid panel reflex to direct LDL Fasting     CK total     CBC with platelets     TSH with free T4 reflex     UA reflex to Microscopic and Culture     Albumin Random Urine Quantitative with Creat Ratio     Prostate spec antigen screen     Fecal colorectal cancer screen FIT     Hemoglobin A1c     Urine Microscopic   2. Prediabetes  R73.03 Comprehensive metabolic panel     Lipid panel reflex to direct LDL Fasting     UA reflex to Microscopic and Culture     Albumin Random Urine Quantitative with Creat Ratio     Hemoglobin A1c   3. Hypertension goal BP (blood pressure) < 140/90  I10 Comprehensive metabolic panel     Lipid panel reflex to direct LDL Fasting     UA reflex to Microscopic and Culture     Albumin Random Urine Quantitative with Creat Ratio     lisinopril-hydrochlorothiazide (ZESTORETIC) 20-12.5 MG tablet     metoprolol succinate ER (TOPROL-XL) 50 MG 24 hr tablet   4. Hyperlipidemia LDL goal <100  E78.5 Comprehensive metabolic panel     Lipid panel reflex to direct LDL Fasting     CK  total     atorvastatin (LIPITOR) 40 MG tablet     fenofibrate (TRIGLIDE/LOFIBRA) 160 MG tablet   5. Psoriasis  L40.9 triamcinolone (KENALOG) 0.5 % external ointment   6. Primary osteoarthritis involving multiple joints  M89.49    7. Screening for prostate cancer  Z12.5 Prostate spec antigen screen   8. Special screening for malignant neoplasms, colon  Z12.11 Fecal colorectal cancer screen FIT   9. Medication monitoring encounter  Z51.81 Comprehensive metabolic panel     Lipid panel reflex to direct LDL Fasting     CK total     CBC with platelets     TSH with free T4 reflex     UA reflex to Microscopic and Culture     Albumin Random Urine Quantitative with Creat Ratio     Hemoglobin A1c       PLAN    Discussed treatment/modality options, including risk and benefits, he desires:    advised alcohol consumption 1oz per day or less, advised aspirin 81 mg po daily, advised 1 multivitamin per day, advised calcium 0832-0700 mg/d and Vitamin D 800-1200 IU/d, advised dentist every 6 months, advised diet and exercise, advised opthalmologist every 6 months, advised blood pressure checks monthly prn (nurse only), further health care maintenance, further lab(s), medication refill(s) and observation    All diagnosis above reviewed and noted above, otherwise stable.      See [facility] [employer] orders for further details.      1) meds refilled    2) labs pending    3) get colonoscopy report    4) watch BP - 128/79 - recently at home, better at home    5) getting COVID - declines all other immunzations    Return in about 6 months (around 9/3/2021), or if symptoms worsen or fail to improve, for Annual Wellness Visit, Medication Recheck Visit, Follow Up Chronic.    Health Maintenance Due   Topic Date Due     COVID-19 Vaccine (1 of 2) 06/11/1962     DTAP/TDAP/TD IMMUNIZATION (1 - Tdap) 06/11/1971     ZOSTER IMMUNIZATION (1 of 2) 06/11/1996     Pneumococcal Vaccine: 65+ Years (1 of 1 - PPSV23) 06/11/2011       End of Life Planning:  Patient  currently has an advanced directive: No.  I have verified the patient's ablity to prepare an advanced directive/make health care decisions.  Literature was provided to assist patient in preparing an advanced directive.    COUNSELING    Reviewed preventive health counseling, as reflected in patient instructions    Estimated body mass index is 29.16 kg/m  as calculated from the following:    Height as of 12/29/20: 1.829 m (6').    Weight as of 12/29/20: 97.5 kg (215 lb).         reports that he has never smoked. He has never used smokeless tobacco.      Appropriate preventive services were discussed with this patient, including applicable screening as appropriate for cardiovascular disease, diabetes, osteopenia/osteoporosis, and glaucoma.  As appropriate for age/gender, discussed screening for colorectal cancer, prostate cancer, breast cancer, and cervical cancer. Checklist reviewing preventive services available has been given to the patient.    Reviewed patients plan of care and provided an AVS. The Intermediate Care Plan ( asthma action plan, low back pain action plan, and migraine action plan) for Terry meets the Care Plan requirement. This Care Plan has been established and reviewed with the Patient.         Jalen Daly MD, FAAFP     [facility]  [address] [place] [id number]  [email or web address]   Office: [phone]  Fax: [phone]  Pager: [phone]

## 2021-03-03 NOTE — PATIENT INSTRUCTIONS
Patient Education   Personalized Prevention Plan  You are due for the preventive services outlined below.  Your care team is available to assist you in scheduling these services.  If you have already completed any of these items, please share that information with your care team to update in your medical record.  Health Maintenance Due   Topic Date Due     Discuss Advance Care Planning  1945     COVID-19 Vaccine (1 of 2) 06/11/1962     Diptheria Tetanus Pertussis (DTAP/TDAP/TD) Vaccine (1 - Tdap) 06/11/1971     Zoster (Shingles) Vaccine (1 of 2) 06/11/1996     Annual Wellness Visit  06/11/2011     Pneumococcal Vaccine (1 of 1 - PPSV23) 06/11/2011     PHQ-2  01/01/2021        Patient Education   Personalized Prevention Plan  You are due for the preventive services outlined below.  Your care team is available to assist you in scheduling these services.  If you have already completed any of these items, please share that information with your care team to update in your medical record.  Health Maintenance Due   Topic Date Due     Discuss Advance Care Planning  1945     COVID-19 Vaccine (1 of 2) 06/11/1962     Diptheria Tetanus Pertussis (DTAP/TDAP/TD) Vaccine (1 - Tdap) 06/11/1971     Zoster (Shingles) Vaccine (1 of 2) 06/11/1996     Annual Wellness Visit  06/11/2011     Pneumococcal Vaccine (1 of 1 - PPSV23) 06/11/2011     PHQ-2  01/01/2021

## 2021-03-04 LAB
ALBUMIN SERPL-MCNC: 3.7 G/DL (ref 3.4–5)
ALP SERPL-CCNC: 73 U/L (ref 40–150)
ALT SERPL W P-5'-P-CCNC: 30 U/L (ref 0–70)
ANION GAP SERPL CALCULATED.3IONS-SCNC: 2 MMOL/L (ref 3–14)
AST SERPL W P-5'-P-CCNC: 27 U/L (ref 0–45)
BILIRUB SERPL-MCNC: 0.8 MG/DL (ref 0.2–1.3)
BUN SERPL-MCNC: 32 MG/DL (ref 7–30)
CALCIUM SERPL-MCNC: 10 MG/DL (ref 8.5–10.1)
CHLORIDE SERPL-SCNC: 107 MMOL/L (ref 94–109)
CHOLEST SERPL-MCNC: 241 MG/DL
CK SERPL-CCNC: 127 U/L (ref 30–300)
CO2 SERPL-SCNC: 30 MMOL/L (ref 20–32)
CREAT SERPL-MCNC: 1.45 MG/DL (ref 0.66–1.25)
CREAT UR-MCNC: 165 MG/DL
GFR SERPL CREATININE-BSD FRML MDRD: 47 ML/MIN/{1.73_M2}
GLUCOSE SERPL-MCNC: 127 MG/DL (ref 70–99)
HDLC SERPL-MCNC: 41 MG/DL
LDLC SERPL CALC-MCNC: 163 MG/DL
MICROALBUMIN UR-MCNC: 8 MG/L
MICROALBUMIN/CREAT UR: 4.97 MG/G CR (ref 0–17)
NONHDLC SERPL-MCNC: 200 MG/DL
POTASSIUM SERPL-SCNC: 4.5 MMOL/L (ref 3.4–5.3)
PROT SERPL-MCNC: 8.3 G/DL (ref 6.8–8.8)
PSA SERPL-ACNC: 2.51 UG/L (ref 0–4)
SODIUM SERPL-SCNC: 139 MMOL/L (ref 133–144)
TRIGL SERPL-MCNC: 186 MG/DL
TSH SERPL DL<=0.005 MIU/L-ACNC: 1.3 MU/L (ref 0.4–4)

## 2021-03-05 ENCOUNTER — IMMUNIZATION (OUTPATIENT)
Dept: NURSING | Facility: CLINIC | Age: 76
End: 2021-03-05
Payer: COMMERCIAL

## 2021-03-05 PROCEDURE — 0011A PR COVID VAC MODERNA 100 MCG/0.5 ML IM: CPT

## 2021-03-05 PROCEDURE — 91301 PR COVID VAC MODERNA 100 MCG/0.5 ML IM: CPT

## 2021-03-15 DIAGNOSIS — I10 HYPERTENSION GOAL BP (BLOOD PRESSURE) < 140/90: ICD-10-CM

## 2021-03-16 RX ORDER — METOPROLOL SUCCINATE 50 MG/1
TABLET, EXTENDED RELEASE ORAL
Qty: 90 TABLET | Refills: 3 | Status: SHIPPED | OUTPATIENT
Start: 2021-03-16 | End: 2022-03-07

## 2021-03-22 ENCOUNTER — VIRTUAL VISIT (OUTPATIENT)
Dept: FAMILY MEDICINE | Facility: CLINIC | Age: 76
End: 2021-03-22
Payer: COMMERCIAL

## 2021-03-22 VITALS — SYSTOLIC BLOOD PRESSURE: 110 MMHG | DIASTOLIC BLOOD PRESSURE: 71 MMHG

## 2021-03-22 DIAGNOSIS — Z51.81 MEDICATION MONITORING ENCOUNTER: ICD-10-CM

## 2021-03-22 DIAGNOSIS — I10 HYPERTENSION GOAL BP (BLOOD PRESSURE) < 140/90: ICD-10-CM

## 2021-03-22 DIAGNOSIS — R73.03 PREDIABETES: Primary | ICD-10-CM

## 2021-03-22 DIAGNOSIS — E78.5 HYPERLIPIDEMIA LDL GOAL <100: ICD-10-CM

## 2021-03-22 PROCEDURE — 99213 OFFICE O/P EST LOW 20 MIN: CPT | Mod: TEL | Performed by: FAMILY MEDICINE

## 2021-03-22 NOTE — PROGRESS NOTES
[facility] [place]    Telephone visit  - [phone]    Subjective    Terry Verduzco is a 75 year old male who is being evaluated via a billable telephone visit.      Chief Complaint   Patient presents with     Recheck Medication     Has limited salts and carbs    COVID #1, 3/5/2021, #2 4/2/2021    Prediabetes    Glucose   Date Value Ref Range Status   03/03/2021 127 (H) 70 - 99 mg/dL Final   12/29/2020 126 (H) 70 - 99 mg/dL Final     Comment:     Fasting specimen   12/27/2005 117 (H) 60 - 110 mg/dL Final       Lab Results   Component Value Date    A1C 6.0 03/03/2021    A1C 5.9 12/29/2020     Hyperlipidemia Follow-Up      Are you regularly taking any medication or supplement to lower your cholesterol?   Yes- lipitor    Are you having muscle aches or other side effects that you think could be caused by your cholesterol lowering medication?  No    Recent Labs   Lab Test 03/03/21  1005 12/29/20  0947   CHOL 241* 180   HDL 41 37*   * 92   TRIG 186* 254*     Hypertension Follow-up      Do you check your blood pressure regularly outside of the clinic? Yes 114/76 ,119/78  110/70    Are you following a low salt diet? Yes    Are your blood pressures ever more than 140 on the top number (systolic) OR more   than 90 on the bottom number (diastolic), for example 140/90? No      How many servings of fruits and vegetables do you eat daily?  2-3    On average, how many sweetened beverages do you drink each day (Examples: soda, juice, sweet tea, etc.  Do NOT count diet or artificially sweetened beverages)?   1    How many days per week do you exercise enough to make your heart beat faster? 5    How many minutes a day do you exercise enough to make your heart beat faster? 20 - 29    How many days per week do you miss taking your medication? 0    BP Readings from Last 3 Encounters:   03/22/21 110/71   03/03/21 128/79   01/11/21 130/88     Creatinine   Date Value Ref Range Status   03/03/2021 1.45 (H) 0.66 - 1.25  mg/dL Final     GFR Estimate   Date Value Ref Range Status   03/03/2021 47 (L) >60 mL/min/[1.73_m2] Final     Comment:     Non  GFR Calc  Starting 12/18/2018, serum creatinine based estimated GFR (eGFR) will be   calculated using the Chronic Kidney Disease Epidemiology Collaboration   (CKD-EPI) equation.       [facility]    Past Medical History:   Diagnosis Date     Hyperlipidemia LDL goal <100      Hypertension goal BP (blood pressure) < 140/90      Prediabetes 01/01/2021     Primary osteoarthritis involving multiple joints      Psoriasis        PSH    Past Surgical History:   Procedure Laterality Date     SURGICAL HISTORY OF -  Right 1982    right inguinal hernia     SURGICAL HISTORY OF -   1985    cholecystectomy     SURGICAL HISTORY OF -  Left 1964    left testicle removed secondary to injury     SURGICAL HISTORY OF -  Left 01/2021    Left eye - Dr Lees - Macular Hole and Epiretinal Membrane       Medications    Current Outpatient Medications   Medication Sig Dispense Refill     atorvastatin (LIPITOR) 40 MG tablet Take 1 tablet (40 mg) by mouth daily 90 tablet 3     cholecalciferol (VITAMIN D3) 25 mcg (1000 units) capsule Take 1 capsule by mouth daily       fenofibrate (TRIGLIDE/LOFIBRA) 160 MG tablet Take 1 tablet (160 mg) by mouth daily 90 tablet 3     glucosamine-chondroitin 500-400 MG CAPS per capsule Take 1 capsule by mouth daily       lisinopril-hydrochlorothiazide (ZESTORETIC) 20-12.5 MG tablet Take 1 tablet by mouth daily 90 tablet 3     metoprolol succinate ER (TOPROL-XL) 50 MG 24 hr tablet TAKE 1 TABLET BY MOUTH EVERY DAY 90 tablet 3     triamcinolone (KENALOG) 0.5 % external ointment Apply topically 2 times daily 120 g 3       Allergies    Codeine and Demerol [meperidine]    Family History    Family History   Problem Relation Age of Onset     Hypertension Mother      Parkinsonism Father      Parkinsonism Brother      Dementia Maternal Grandfather      Cancer Brother         eye cancer,  blastomycosis     Cerebrovascular Disease Brother         bike accident in MVA       Social History    Social History     Socioeconomic History     Marital status:      Spouse name: Claritza     Number of children: 5     Years of education: 12     Highest education level: None   Occupational History     None   Social Needs     Financial resource strain: None     Food insecurity     Worry: None     Inability: None     Transportation needs     Medical: None     Non-medical: None   Tobacco Use     Smoking status: Never Smoker     Smokeless tobacco: Never Used   Substance and Sexual Activity     Alcohol use: Not Currently     Drug use: Never     Sexual activity: Yes     Partners: Female   Lifestyle     Physical activity     Days per week: None     Minutes per session: None     Stress: None   Relationships     Social connections     Talks on phone: None     Gets together: None     Attends Yarsanism service: None     Active member of club or organization: None     Attends meetings of clubs or organizations: None     Relationship status: None     Intimate partner violence     Fear of current or ex partner: None     Emotionally abused: None     Physically abused: None     Forced sexual activity: None   Other Topics Concern     None   Social History Narrative     None       Reviewed and updated as needed this visit by Provider                   Review of Systems     CONSTITUTIONAL: NEGATIVE for fever, chills, change in weight  INTEGUMENTARY/SKIN: NEGATIVE for worrisome rashes, moles or lesions  EYES: NEGATIVE for vision changes or irritation  ENT/MOUTH: NEGATIVE for ear, mouth and throat problems  RESP: NEGATIVE for significant cough or SOB  CV: NEGATIVE for chest pain, palpitations or peripheral edema  GI: NEGATIVE for nausea, abdominal pain, heartburn, or change in bowel habits  : NEGATIVE for frequency, dysuria, or hematuria  MUSCULOSKELETAL: NEGATIVE for significant arthralgias or myalgia  NEURO: NEGATIVE for  weakness, dizziness or paresthesias  ENDOCRINE: NEGATIVE for temperature intolerance, skin/hair changes  HEME: NEGATIVE for bleeding problems  PSYCHIATRIC: NEGATIVE for changes in mood or affect    Objective    Reported vitals:  /71      healthy, alert and no distress  Psych: Alert and oriented times 3; coherent speech, normal   rate and volume, able to articulate logical thoughts, able   to abstract reason, no tangential thoughts, no hallucinations   or delusions  His affect is normal     RESP: No cough, no audible wheezing, able to talk in full sentences  Remainder of exam unable to be completed due to telephone visits    Diagnostic Test Results:  Labs reviewed in Epic         Assessment/Plan:      ICD-10-CM    1. Prediabetes  R73.03 Lipid panel reflex to direct LDL Fasting     Hemoglobin A1c     Comprehensive metabolic panel (BMP + Alb, Alk Phos, ALT, AST, Total. Bili, TP)   2. Hypertension goal BP (blood pressure) < 140/90  I10 Comprehensive metabolic panel (BMP + Alb, Alk Phos, ALT, AST, Total. Bili, TP)   3. Hyperlipidemia LDL goal <100  E78.5 Lipid panel reflex to direct LDL Fasting     Comprehensive metabolic panel (BMP + Alb, Alk Phos, ALT, AST, Total. Bili, TP)     CK total   4. Medication monitoring encounter  Z51.81 Lipid panel reflex to direct LDL Fasting     Hemoglobin A1c     Comprehensive metabolic panel (BMP + Alb, Alk Phos, ALT, AST, Total. Bili, TP)     CK total     Low cholesterol, low carb diet  Regular exercise  Fasting labs in 3 months    Return in about 3 months (around 6/22/2021), or if symptoms worsen or fail to improve, for Medication Recheck Visit, Lab Work.    Phone call duration:  14 minutes           Jalen Daly MD, FAAFP     [facility]  [address] [place] [address]  [email or web address]   Office: [phone]  Fax: [phone]  Pager: [phone]

## 2021-04-02 ENCOUNTER — IMMUNIZATION (OUTPATIENT)
Dept: NURSING | Facility: CLINIC | Age: 76
End: 2021-04-02
Attending: INTERNAL MEDICINE
Payer: COMMERCIAL

## 2021-04-02 PROCEDURE — 91301 PR COVID VAC MODERNA 100 MCG/0.5 ML IM: CPT

## 2021-04-02 PROCEDURE — 0012A PR COVID VAC MODERNA 100 MCG/0.5 ML IM: CPT

## 2021-04-06 ENCOUNTER — TELEPHONE (OUTPATIENT)
Dept: FAMILY MEDICINE | Facility: CLINIC | Age: 76
End: 2021-04-06

## 2021-04-06 NOTE — TELEPHONE ENCOUNTER
Reason for Call:  Form, our goal is to have forms completed with 72 hours, however, some forms may require a visit or additional information.    Type of letter, form or note:  Authorization for RX    Who is the form from?: [facility] (if other please explain)    Where did the form come from: form was faxed in    What clinic location was the form placed at?: [place]    Where the form was placed: Dr Daly Box/Folder    What number is listed as a contact on the form?: [phone]           Call taken on 4/6/2021 at 1:08 PM by Zuleyka Rowland

## 2021-07-05 DIAGNOSIS — Z51.81 MEDICATION MONITORING ENCOUNTER: ICD-10-CM

## 2021-07-05 DIAGNOSIS — R73.03 PREDIABETES: ICD-10-CM

## 2021-07-05 DIAGNOSIS — I10 HYPERTENSION GOAL BP (BLOOD PRESSURE) < 140/90: ICD-10-CM

## 2021-07-05 DIAGNOSIS — E78.5 HYPERLIPIDEMIA LDL GOAL <100: ICD-10-CM

## 2021-07-05 LAB — HBA1C MFR BLD: 5.6 % (ref 0–5.6)

## 2021-07-05 PROCEDURE — 80053 COMPREHEN METABOLIC PANEL: CPT | Performed by: FAMILY MEDICINE

## 2021-07-05 PROCEDURE — 36415 COLL VENOUS BLD VENIPUNCTURE: CPT | Performed by: FAMILY MEDICINE

## 2021-07-05 PROCEDURE — 83036 HEMOGLOBIN GLYCOSYLATED A1C: CPT | Performed by: FAMILY MEDICINE

## 2021-07-05 PROCEDURE — 82550 ASSAY OF CK (CPK): CPT | Performed by: FAMILY MEDICINE

## 2021-07-05 PROCEDURE — 80061 LIPID PANEL: CPT | Performed by: FAMILY MEDICINE

## 2021-07-05 NOTE — LETTER
[facility]  [address] [place] [address]  [phone]                    July 7, 2021    Terry Verduzco  [address] [place] [id number]      Dear Terry,    Here is a summary of your recent test results:      -Cholesterol levels are at your goal levels.  ADVISE: continuing your medication, a regular exercise program with at least 150 minutes of aerobic exercise per week, and eating a low saturated fat/low carbohydrate diet.  Also, you should recheck this fasting cholesterol panel in 12 months.   -Liver and gallbladder tests are normal (ALT,AST, Alk phos, bilirubin), kidney function is decreased but stable (Cr, GFR), sodium is normal, potassium is normal, calcium is normal, glucose is elevated and consistent with prediabetes.   -A1C (diabetic test) is elevated and consistent with prediabetes.   -CK (muscle enzyme test) is normal.   Thank you very much for trusting [facility].     Healthy regards,    Precious Antunez PA-C / on behalf of           Jalen Daly M.D.        Results for orders placed or performed in visit on 07/05/21   CK total     Status: None   Result Value Ref Range    CK Total 176 30 - 300 U/L   Comprehensive metabolic panel (BMP + Alb, Alk Phos, ALT, AST, Total. Bili, TP)     Status: Abnormal   Result Value Ref Range    Sodium 136 133 - 144 mmol/L    Potassium 4.3 3.4 - 5.3 mmol/L    Chloride 104 94 - 109 mmol/L    Carbon Dioxide 26 20 - 32 mmol/L    Anion Gap 6 3 - 14 mmol/L    Glucose 102 (H) 70 - 99 mg/dL    Urea Nitrogen 32 (H) 7 - 30 mg/dL    Creatinine 1.56 (H) 0.66 - 1.25 mg/dL    GFR Estimate 43 (L) >60 mL/min/[1.73_m2]    GFR Estimate If Black 50 (L) >60 mL/min/[1.73_m2]    Calcium 9.9 8.5 - 10.1 mg/dL    Bilirubin Total 0.8 0.2 - 1.3 mg/dL    Albumin 3.9 3.4 - 5.0 g/dL    Protein Total 7.7 6.8 - 8.8 g/dL    Alkaline Phosphatase 59 40 - 150 U/L    ALT 36 0 - 70 U/L    AST 35 0 - 45 U/L   Hemoglobin A1c     Status:  None   Result Value Ref Range    Hemoglobin A1C 5.6 0 - 5.6 %   Lipid panel reflex to direct LDL Fasting     Status: Abnormal   Result Value Ref Range    Cholesterol 209 (H) <200 mg/dL    Triglycerides 114 <150 mg/dL    HDL Cholesterol 48 >39 mg/dL    LDL Cholesterol Calculated 138 (H) <100 mg/dL    Non HDL Cholesterol 161 (H) <130 mg/dL

## 2021-07-06 LAB
ALBUMIN SERPL-MCNC: 3.9 G/DL (ref 3.4–5)
ALP SERPL-CCNC: 59 U/L (ref 40–150)
ALT SERPL W P-5'-P-CCNC: 36 U/L (ref 0–70)
ANION GAP SERPL CALCULATED.3IONS-SCNC: 6 MMOL/L (ref 3–14)
AST SERPL W P-5'-P-CCNC: 35 U/L (ref 0–45)
BILIRUB SERPL-MCNC: 0.8 MG/DL (ref 0.2–1.3)
BUN SERPL-MCNC: 32 MG/DL (ref 7–30)
CALCIUM SERPL-MCNC: 9.9 MG/DL (ref 8.5–10.1)
CHLORIDE SERPL-SCNC: 104 MMOL/L (ref 94–109)
CHOLEST SERPL-MCNC: 209 MG/DL
CK SERPL-CCNC: 176 U/L (ref 30–300)
CO2 SERPL-SCNC: 26 MMOL/L (ref 20–32)
CREAT SERPL-MCNC: 1.56 MG/DL (ref 0.66–1.25)
GFR SERPL CREATININE-BSD FRML MDRD: 43 ML/MIN/{1.73_M2}
GLUCOSE SERPL-MCNC: 102 MG/DL (ref 70–99)
HDLC SERPL-MCNC: 48 MG/DL
LDLC SERPL CALC-MCNC: 138 MG/DL
NONHDLC SERPL-MCNC: 161 MG/DL
POTASSIUM SERPL-SCNC: 4.3 MMOL/L (ref 3.4–5.3)
PROT SERPL-MCNC: 7.7 G/DL (ref 6.8–8.8)
SODIUM SERPL-SCNC: 136 MMOL/L (ref 133–144)
TRIGL SERPL-MCNC: 114 MG/DL

## 2021-07-06 NOTE — RESULT ENCOUNTER NOTE
Team: Please mail result letter     Terry  I have reviewed your recent labs. Here are the results:    -Cholesterol levels are at your goal levels.  ADVISE: continuing your medication, a regular exercise program with at least 150 minutes of aerobic exercise per week, and eating a low saturated fat/low carbohydrate diet.  Also, you should recheck this fasting cholesterol panel in 12 months.  -Liver and gallbladder tests are normal (ALT,AST, Alk phos, bilirubin), kidney function is decreased but stable (Cr, GFR), sodium is normal, potassium is normal, calcium is normal, glucose is elevated and consistent with prediabetes.  -A1C (diabetic test) is elevated and consistent with prediabetes.  -CK (muscle enzyme test) is normal.     If you have any questions please do not hesitate to contact our office via phone ([phone]) or MyChart.    Precious Antunez, MS, PA-C (covering for Dr. Daly)  [facility]

## 2021-10-23 ENCOUNTER — HEALTH MAINTENANCE LETTER (OUTPATIENT)
Age: 76
End: 2021-10-23

## 2022-03-24 DIAGNOSIS — I10 HYPERTENSION GOAL BP (BLOOD PRESSURE) < 140/90: ICD-10-CM

## 2022-03-24 DIAGNOSIS — E78.5 HYPERLIPIDEMIA LDL GOAL <100: ICD-10-CM

## 2022-03-24 NOTE — LETTER
[address] [id number] [phone]       March 31, 2022    Terry Verduzco  [address] [place] [id number]    To Terry:     We have been calling you regarding a recent refill request we received for Lipitor, Fenofibrate,Lisinopril-hydrochlorothiazide, and Metoprolol succinate ER    .  Unfortunately, we were unable to reach you.  We are notifying you that you are due for annual wellness and fasting labs prior to your next refill.  You can schedule this appointment via [employer] or by calling the clinic at [phone] Option 1.          Sincerely,        Jalen Daly M.D./crc

## 2022-03-25 RX ORDER — FENOFIBRATE 160 MG/1
TABLET ORAL
Qty: 30 TABLET | Refills: 0 | Status: SHIPPED | OUTPATIENT
Start: 2022-03-25 | End: 2022-04-19

## 2022-03-25 RX ORDER — ATORVASTATIN CALCIUM 40 MG/1
TABLET, FILM COATED ORAL
Qty: 30 TABLET | Refills: 0 | Status: SHIPPED | OUTPATIENT
Start: 2022-03-25 | End: 2022-04-19

## 2022-03-25 RX ORDER — LISINOPRIL AND HYDROCHLOROTHIAZIDE 12.5; 2 MG/1; MG/1
TABLET ORAL
Qty: 90 TABLET | Refills: 3 | Status: SHIPPED | OUTPATIENT
Start: 2022-03-25 | End: 2022-05-25

## 2022-03-25 RX ORDER — METOPROLOL SUCCINATE 50 MG/1
TABLET, EXTENDED RELEASE ORAL
Qty: 30 TABLET | Refills: 0 | Status: SHIPPED | OUTPATIENT
Start: 2022-03-25 | End: 2022-04-19

## 2022-04-09 ENCOUNTER — HEALTH MAINTENANCE LETTER (OUTPATIENT)
Age: 77
End: 2022-04-09

## 2022-05-18 ASSESSMENT — ENCOUNTER SYMPTOMS
DIZZINESS: 0
ABDOMINAL PAIN: 0
MYALGIAS: 0
PARESTHESIAS: 0
SHORTNESS OF BREATH: 0
CONSTIPATION: 0
HEMATOCHEZIA: 0
DIARRHEA: 0
HEADACHES: 0
FREQUENCY: 0
JOINT SWELLING: 0
NAUSEA: 0
NERVOUS/ANXIOUS: 0
WEAKNESS: 0
PALPITATIONS: 0
HEARTBURN: 0
CHILLS: 0
EYE PAIN: 0
FEVER: 0
COUGH: 0
DYSURIA: 0
SORE THROAT: 0
ARTHRALGIAS: 0
HEMATURIA: 0

## 2022-05-18 ASSESSMENT — ACTIVITIES OF DAILY LIVING (ADL): CURRENT_FUNCTION: NO ASSISTANCE NEEDED

## 2022-05-25 ENCOUNTER — OFFICE VISIT (OUTPATIENT)
Dept: FAMILY MEDICINE | Facility: CLINIC | Age: 77
End: 2022-05-25
Payer: COMMERCIAL

## 2022-05-25 VITALS
OXYGEN SATURATION: 97 % | DIASTOLIC BLOOD PRESSURE: 82 MMHG | HEART RATE: 105 BPM | SYSTOLIC BLOOD PRESSURE: 130 MMHG | HEIGHT: 72 IN | BODY MASS INDEX: 26.82 KG/M2 | TEMPERATURE: 98.7 F | WEIGHT: 198 LBS

## 2022-05-25 DIAGNOSIS — I10 HYPERTENSION GOAL BP (BLOOD PRESSURE) < 140/90: ICD-10-CM

## 2022-05-25 DIAGNOSIS — Z12.11 SCREEN FOR COLON CANCER: ICD-10-CM

## 2022-05-25 DIAGNOSIS — Z51.81 MEDICATION MONITORING ENCOUNTER: ICD-10-CM

## 2022-05-25 DIAGNOSIS — Z12.5 SCREENING FOR PROSTATE CANCER: ICD-10-CM

## 2022-05-25 DIAGNOSIS — L40.9 PSORIASIS: ICD-10-CM

## 2022-05-25 DIAGNOSIS — Z00.00 ROUTINE GENERAL MEDICAL EXAMINATION AT A HEALTH CARE FACILITY: Primary | ICD-10-CM

## 2022-05-25 DIAGNOSIS — M15.0 PRIMARY OSTEOARTHRITIS INVOLVING MULTIPLE JOINTS: ICD-10-CM

## 2022-05-25 DIAGNOSIS — R73.03 PREDIABETES: ICD-10-CM

## 2022-05-25 DIAGNOSIS — K40.91 UNILATERAL RECURRENT INGUINAL HERNIA WITHOUT OBSTRUCTION OR GANGRENE: ICD-10-CM

## 2022-05-25 DIAGNOSIS — E78.5 HYPERLIPIDEMIA LDL GOAL <100: ICD-10-CM

## 2022-05-25 LAB
ALBUMIN SERPL-MCNC: 3.8 G/DL (ref 3.4–5)
ALBUMIN UR-MCNC: NEGATIVE MG/DL
ALP SERPL-CCNC: 59 U/L (ref 40–150)
ALT SERPL W P-5'-P-CCNC: 29 U/L (ref 0–70)
ANION GAP SERPL CALCULATED.3IONS-SCNC: 7 MMOL/L (ref 3–14)
APPEARANCE UR: CLEAR
AST SERPL W P-5'-P-CCNC: 31 U/L (ref 0–45)
BILIRUB SERPL-MCNC: 1 MG/DL (ref 0.2–1.3)
BILIRUB UR QL STRIP: NEGATIVE
BUN SERPL-MCNC: 29 MG/DL (ref 7–30)
CALCIUM SERPL-MCNC: 9.5 MG/DL (ref 8.5–10.1)
CHLORIDE BLD-SCNC: 107 MMOL/L (ref 94–109)
CHOLEST SERPL-MCNC: 203 MG/DL
CK SERPL-CCNC: 164 U/L (ref 30–300)
CO2 SERPL-SCNC: 25 MMOL/L (ref 20–32)
COLOR UR AUTO: YELLOW
CREAT SERPL-MCNC: 1.45 MG/DL (ref 0.66–1.25)
CREAT UR-MCNC: 192 MG/DL
ERYTHROCYTE [DISTWIDTH] IN BLOOD BY AUTOMATED COUNT: 12.6 % (ref 10–15)
FASTING STATUS PATIENT QL REPORTED: YES
GFR SERPL CREATININE-BSD FRML MDRD: 50 ML/MIN/1.73M2
GLUCOSE BLD-MCNC: 123 MG/DL (ref 70–99)
GLUCOSE UR STRIP-MCNC: NEGATIVE MG/DL
HBA1C MFR BLD: 5.8 % (ref 0–5.6)
HCT VFR BLD AUTO: 43.9 % (ref 40–53)
HDLC SERPL-MCNC: 45 MG/DL
HGB BLD-MCNC: 14.7 G/DL (ref 13.3–17.7)
HGB UR QL STRIP: NEGATIVE
KETONES UR STRIP-MCNC: NEGATIVE MG/DL
LDLC SERPL CALC-MCNC: 131 MG/DL
LEUKOCYTE ESTERASE UR QL STRIP: NEGATIVE
MCH RBC QN AUTO: 30.4 PG (ref 26.5–33)
MCHC RBC AUTO-ENTMCNC: 33.5 G/DL (ref 31.5–36.5)
MCV RBC AUTO: 91 FL (ref 78–100)
MICROALBUMIN UR-MCNC: 12 MG/L
MICROALBUMIN/CREAT UR: 6.25 MG/G CR (ref 0–17)
NITRATE UR QL: NEGATIVE
NONHDLC SERPL-MCNC: 158 MG/DL
PH UR STRIP: 5.5 [PH] (ref 5–7)
PLATELET # BLD AUTO: 261 10E3/UL (ref 150–450)
POTASSIUM BLD-SCNC: 4.3 MMOL/L (ref 3.4–5.3)
PROT SERPL-MCNC: 7.6 G/DL (ref 6.8–8.8)
PSA SERPL-MCNC: 3.04 UG/L (ref 0–4)
RBC # BLD AUTO: 4.84 10E6/UL (ref 4.4–5.9)
SODIUM SERPL-SCNC: 139 MMOL/L (ref 133–144)
SP GR UR STRIP: 1.02 (ref 1–1.03)
TRIGL SERPL-MCNC: 134 MG/DL
TSH SERPL DL<=0.005 MIU/L-ACNC: 1.28 MU/L (ref 0.4–4)
UROBILINOGEN UR STRIP-ACNC: 0.2 E.U./DL
WBC # BLD AUTO: 6.2 10E3/UL (ref 4–11)

## 2022-05-25 PROCEDURE — 83036 HEMOGLOBIN GLYCOSYLATED A1C: CPT | Performed by: FAMILY MEDICINE

## 2022-05-25 PROCEDURE — G0103 PSA SCREENING: HCPCS | Performed by: FAMILY MEDICINE

## 2022-05-25 PROCEDURE — 80061 LIPID PANEL: CPT | Performed by: FAMILY MEDICINE

## 2022-05-25 PROCEDURE — 85027 COMPLETE CBC AUTOMATED: CPT | Performed by: FAMILY MEDICINE

## 2022-05-25 PROCEDURE — 80053 COMPREHEN METABOLIC PANEL: CPT | Performed by: FAMILY MEDICINE

## 2022-05-25 PROCEDURE — 81003 URINALYSIS AUTO W/O SCOPE: CPT | Performed by: FAMILY MEDICINE

## 2022-05-25 PROCEDURE — 82550 ASSAY OF CK (CPK): CPT | Performed by: FAMILY MEDICINE

## 2022-05-25 PROCEDURE — 82043 UR ALBUMIN QUANTITATIVE: CPT | Performed by: FAMILY MEDICINE

## 2022-05-25 PROCEDURE — 84443 ASSAY THYROID STIM HORMONE: CPT | Performed by: FAMILY MEDICINE

## 2022-05-25 PROCEDURE — 99397 PER PM REEVAL EST PAT 65+ YR: CPT | Performed by: FAMILY MEDICINE

## 2022-05-25 PROCEDURE — 36415 COLL VENOUS BLD VENIPUNCTURE: CPT | Performed by: FAMILY MEDICINE

## 2022-05-25 RX ORDER — FENOFIBRATE 160 MG/1
160 TABLET ORAL DAILY
Qty: 90 TABLET | Refills: 3 | Status: SHIPPED | OUTPATIENT
Start: 2022-05-25 | End: 2022-07-29

## 2022-05-25 RX ORDER — LISINOPRIL AND HYDROCHLOROTHIAZIDE 12.5; 2 MG/1; MG/1
1 TABLET ORAL DAILY
Qty: 90 TABLET | Refills: 3 | Status: ON HOLD | OUTPATIENT
Start: 2022-05-25 | End: 2022-06-25

## 2022-05-25 RX ORDER — ATORVASTATIN CALCIUM 40 MG/1
40 TABLET, FILM COATED ORAL DAILY
Qty: 90 TABLET | Refills: 3 | Status: SHIPPED | OUTPATIENT
Start: 2022-05-25 | End: 2022-05-31

## 2022-05-25 RX ORDER — METOPROLOL SUCCINATE 50 MG/1
50 TABLET, EXTENDED RELEASE ORAL DAILY
Qty: 90 TABLET | Refills: 3 | Status: SHIPPED | OUTPATIENT
Start: 2022-05-25 | End: 2022-11-07

## 2022-05-25 ASSESSMENT — ENCOUNTER SYMPTOMS
DIZZINESS: 0
HEMATURIA: 0
NAUSEA: 0
COUGH: 0
CHILLS: 0
HEARTBURN: 0
HEMATOCHEZIA: 0
ABDOMINAL PAIN: 0
JOINT SWELLING: 0
FREQUENCY: 0
NERVOUS/ANXIOUS: 0
MYALGIAS: 0
HEADACHES: 0
SHORTNESS OF BREATH: 0
PARESTHESIAS: 0
DYSURIA: 0
PALPITATIONS: 0
FEVER: 0
CONSTIPATION: 0
WEAKNESS: 0
SORE THROAT: 0
EYE PAIN: 0
ARTHRALGIAS: 0
DIARRHEA: 0

## 2022-05-25 ASSESSMENT — ACTIVITIES OF DAILY LIVING (ADL): CURRENT_FUNCTION: NO ASSISTANCE NEEDED

## 2022-05-25 NOTE — PROGRESS NOTES
"[facility]    SUBJECTIVE    Terry Verduzco is a 76 year old male who presents for Preventive Visit.    Patient has been advised of split billing requirements and indicates understanding: Yes  Are you in the first 12 months of your Medicare coverage?  No    Healthy Habits:     In general, how would you rate your overall health?  Excellent    Frequency of exercise:  None    Do you usually eat at least 4 servings of fruit and vegetables a day, include whole grains    & fiber and avoid regularly eating high fat or \"junk\" foods?  Yes    Taking medications regularly:  Yes    Ability to successfully perform activities of daily living:  No assistance needed    Home Safety:  No safety concerns identified    Hearing Impairment:  No hearing concerns    In the past 6 months, have you been bothered by leaking of urine?  No    In general, how would you rate your overall mental or emotional health?  Excellent      PHQ-2 Total Score: 0    Additional concerns today:  Yes     Do you feel safe in your environment? Yes    Have you ever done Advance Care Planning? (For example, a Health Directive, POLST, or a discussion with a medical provider or your loved ones about your wishes): No, advance care planning information given to patient to review.  Patient declined advance care planning discussion at this time.    Fall risk  Fallen 2 or more times in the past year?: No  Any fall with injury in the past year?: No    Cognitive Screening   1) Repeat 3 items (Leader, Season, Table)    2) Clock draw: NORMAL  3) 3 item recall: Recalls NO objects   Results: 0 items recalled: PROBABLE COGNITIVE IMPAIRMENT, **INFORM PROVIDER**    Mini-CogTM Copyright BERTA Welch. Licensed by the author for use in [facility]; reprinted with permission (yakelin@.[email or web address]). All rights reserved.      Do you have sleep apnea, excessive snoring or daytime drowsiness?: no    Reviewed and updated as needed this visit by clinical staff   Tobacco  " Allergies  Meds   Med Hx  Surg Hx  Fam Hx  Soc Hx        Reviewed and updated as needed this visit by Provider                   Social History     Tobacco Use     Smoking status: Never Smoker     Smokeless tobacco: Never Used   Substance Use Topics     Alcohol use: Not Currently     If you drink alcohol do you typically have >3 drinks per day or >7 drinks per week? No    Alcohol Use 5/25/2022   Prescreen: >3 drinks/day or >7 drinks/week? -   Prescreen: >3 drinks/day or >7 drinks/week? No     Prediabetes    Glucose   Date Value Ref Range Status   07/05/2021 102 (H) 70 - 99 mg/dL Final   03/03/2021 127 (H) 70 - 99 mg/dL Final   12/29/2020 126 (H) 70 - 99 mg/dL Final     Comment:     Fasting specimen   12/27/2005 117 (H) 60 - 110 mg/dL Final     Lab Results   Component Value Date    A1C 5.8 05/25/2022    A1C 5.6 07/05/2021    A1C 6.0 03/03/2021    A1C 5.9 12/29/2020     Hyperlipidemia Follow-Up      Are you regularly taking any medication or supplement to lower your cholesterol?   Yes- Lipitor    Are you having muscle aches or other side effects that you think could be caused by your cholesterol lowering medication?  No     Recent Labs   Lab Test 07/05/21  1031 03/03/21  1005   CHOL 209* 241*   HDL 48 41   * 163*   TRIG 114 186*     Hypertension Follow-up      Do you check your blood pressure regularly outside of the clinic? Yes     Are you following a low salt diet? Yes    Are your blood pressures ever more than 140 on the top number (systolic) OR more   than 90 on the bottom number (diastolic), for example 140/90? No     BP Readings from Last 3 Encounters:   05/25/22 130/82   03/22/21 110/71   03/03/21 128/79     Creatinine   Date Value Ref Range Status   07/05/2021 1.56 (H) 0.66 - 1.25 mg/dL Final     GFR Estimate   Date Value Ref Range Status   07/05/2021 43 (L) >60 mL/min/[1.73_m2] Final     Comment:     Non  GFR Calc  Starting 12/18/2018, serum creatinine based estimated GFR (eGFR)  will be   calculated using the Chronic Kidney Disease Epidemiology Collaboration   (CKD-EPI) equation.       OA - no issues    Psoriasis - 80% better while in [place]    Current providers sharing in care for this patient include:     Patient Care Team:  Jalen Daly MD as PCP - General (Family Medicine)  Jalen Daly MD as Assigned PCP    The following health maintenance items are reviewed in Epic and correct as of today:  Health Maintenance Due   Topic Date Due     DTAP/TDAP/TD IMMUNIZATION (1 - Tdap) Never done     ZOSTER IMMUNIZATION (1 of 2) Never done     Pneumococcal Vaccine: 65+ Years (1 - PCV) Never done     COLORECTAL CANCER SCREENING  12/30/2021     Review of Systems   Constitutional: Negative for chills and fever.   HENT: Negative for congestion, ear pain, hearing loss and sore throat.    Eyes: Negative for pain and visual disturbance.   Respiratory: Negative for cough and shortness of breath.    Cardiovascular: Negative for chest pain, palpitations and peripheral edema.   Gastrointestinal: Negative for abdominal pain, constipation, diarrhea, heartburn, hematochezia and nausea.   Genitourinary: Negative for dysuria, frequency, genital sores, hematuria, impotence, penile discharge and urgency.   Musculoskeletal: Negative for arthralgias, joint swelling and myalgias.   Skin: Negative for rash.   Neurological: Negative for dizziness, weakness, headaches and paresthesias.   Psychiatric/Behavioral: Negative for mood changes. The patient is not nervous/anxious.       Health Maintenance     Colonoscopy:  Declines, has never done   FIT:  given              PSA:  pending   DEXA:  NA    Health Maintenance Due   Topic Date Due     DTAP/TDAP/TD IMMUNIZATION (1 - Tdap) Never done     ZOSTER IMMUNIZATION (1 of 2) Never done     Pneumococcal Vaccine: 65+ Years (1 - PCV) Never done     COLORECTAL CANCER SCREENING  12/30/2021       Current Problem List    Patient Active Problem List   Diagnosis     Hypertension goal BP (blood  pressure) < 140/90     Psoriasis     Hyperlipidemia LDL goal <100     Prediabetes     Primary osteoarthritis involving multiple joints       Past Medical History    Past Medical History:   Diagnosis Date     Hyperlipidemia LDL goal <100      Hypertension goal BP (blood pressure) < 140/90      Prediabetes 01/01/2021     Primary osteoarthritis involving multiple joints      Psoriasis        Past Surgical History    Past Surgical History:   Procedure Laterality Date     SURGICAL HISTORY OF -  Right 1982    right inguinal hernia     SURGICAL HISTORY OF -   1985    cholecystectomy     SURGICAL HISTORY OF -  Left 1964    left testicle removed secondary to injury     SURGICAL HISTORY OF -  Left 01/2021    Left eye - Dr Lees - Macular Hole and Epiretinal Membrane     SURGICAL HISTORY OF -   1997    Right Inguinal hernia     SURGICAL HISTORY OF -  Left 07/2021    Left IOL - Dr Becerra       Current Medications    Current Outpatient Medications   Medication Sig Dispense Refill     atorvastatin (LIPITOR) 40 MG tablet Take 1 tablet (40 mg) by mouth daily 90 tablet 3     fenofibrate (TRIGLIDE/LOFIBRA) 160 MG tablet Take 1 tablet (160 mg) by mouth daily 90 tablet 3     lisinopril-hydrochlorothiazide (ZESTORETIC) 20-12.5 MG tablet Take 1 tablet by mouth daily 90 tablet 3     metoprolol succinate ER (TOPROL XL) 50 MG 24 hr tablet Take 1 tablet (50 mg) by mouth daily 90 tablet 3     cholecalciferol (VITAMIN D3) 25 mcg (1000 units) capsule Take 1 capsule by mouth daily       glucosamine-chondroitin 500-400 MG CAPS per capsule Take 1 capsule by mouth daily       triamcinolone (KENALOG) 0.5 % external ointment APPLY TOPICALLY TWO TIMES ADAY 120 g 1       Allergies    Allergies   Allergen Reactions     Codeine      Tachycardia     Demerol [Meperidine] Rash       Immunizations    Immunization History   Administered Date(s) Administered     COVID-19,PF,Moderna 03/05/2021, 04/02/2021, 10/29/2021, 04/28/2022     Influenza, Quad, High Dose,  Pf, 65yr+ (Fluzone HD) 10/29/2021       Family History    Family History   Problem Relation Age of Onset     Hypertension Mother      Parkinsonism Father      Parkinsonism Brother      Dementia Maternal Grandfather      Cancer Brother         eye cancer, blastomycosis     Cerebrovascular Disease Brother         bike accident in MVA       Social History    Social History     Socioeconomic History     Marital status:      Spouse name: Claritza     Number of children: 5     Years of education: 12     Highest education level: Not on file   Occupational History     Not on file   Tobacco Use     Smoking status: Never Smoker     Smokeless tobacco: Never Used   Vaping Use     Vaping Use: Never used   Substance and Sexual Activity     Alcohol use: Not Currently     Drug use: Never     Sexual activity: Yes     Partners: Female     Birth control/protection: None   Other Topics Concern     Parent/sibling w/ CABG, MI or angioplasty before 65F 55M? No   Social History Narrative     Not on file     Social Determinants of Health     Financial Resource Strain: Not on file   Food Insecurity: Not on file   Transportation Needs: Not on file   Physical Activity: Not on file   Stress: Not on file   Social Connections: Not on file   Intimate Partner Violence: Not on file   Housing Stability: Not on file       ROS    CONSTITUTIONAL: NEGATIVE for fever, chills, change in weight  INTEGUMENTARY/SKIN: NEGATIVE for worrisome rashes, moles or lesions  EYES: NEGATIVE for vision changes or irritation  ENT/MOUTH: NEGATIVE for ear, mouth and throat problems  RESP: NEGATIVE for significant cough or SOB  CV: NEGATIVE for chest pain, palpitations or peripheral edema  GI: NEGATIVE for nausea, abdominal pain, heartburn, or change in bowel habits  : NEGATIVE for frequency, dysuria, or hematuria  MUSCULOSKELETAL: NEGATIVE for significant arthralgias or myalgia  NEURO: NEGATIVE for weakness, dizziness or paresthesias  ENDOCRINE: NEGATIVE for  temperature intolerance, skin/hair changes  HEME: NEGATIVE for bleeding problems  PSYCHIATRIC: NEGATIVE for changes in mood or affect    OBJECTIVE    /82   Pulse 105   Temp 98.7  F (37.1  C) (Tympanic)   Ht 1.829 m (6')   Wt 89.8 kg (198 lb)   SpO2 97%   BMI 26.85 kg/m      EXAM:    GENERAL: healthy, alert and no distress  EYES: Eyes grossly normal to inspection, PERRL and conjunctivae and sclerae normal  HENT: ear canals and TM's normal, nose and mouth without ulcers or lesions  NECK: no adenopathy, no asymmetry, masses, or scars and thyroid normal to palpation  RESP: lungs clear to auscultation - no rales, rhonchi or wheezes  CV: regular rate and rhythm, normal S1 S2, no S3 or S4, no murmur, click or rub, no peripheral edema and peripheral pulses strong  ABDOMEN: soft, nontender, no hepatosplenomegaly, no masses and bowel sounds normal   (male): right testicle normal without atrophy or masses, left absent, hernia right inguinal hernia and penis normal without urethral discharge  RECTAL: normal sphincter tone, no rectal masses, prostate smooth, nontender without masses/nodules and prostate 2+ enlarged, nontender  MS: no gross musculoskeletal defects noted, no edema  SKIN: no suspicious lesions or rashes  NEURO: Normal strength and tone, mentation intact and speech normal  PSYCH: mentation appears normal, affect normal/bright  LYMPH: no cervical, supraclavicular, axillary, or inguinal adenopathy    DIAGNOSTICS/PROCEDURES    Pending    ASSESSMENT      ICD-10-CM    1. Routine general medical examination at a health care facility  Z00.00 Comprehensive metabolic panel     Lipid panel reflex to direct LDL Fasting     CBC with platelets     CK total     UA reflex to Microscopic and Culture     Albumin Random Urine Quantitative with Creat Ratio     TSH with free T4 reflex     Prostate Specific Antigen Screen     Fecal colorectal cancer screen FIT     Hemoglobin A1c     REVIEW OF HEALTH MAINTENANCE PROTOCOL  ORDERS     Comprehensive metabolic panel     Lipid panel reflex to direct LDL Fasting     CBC with platelets     CK total     UA reflex to Microscopic and Culture     Albumin Random Urine Quantitative with Creat Ratio     TSH with free T4 reflex     Prostate Specific Antigen Screen     Fecal colorectal cancer screen FIT     Hemoglobin A1c   2. Prediabetes  R73.03 Comprehensive metabolic panel     Lipid panel reflex to direct LDL Fasting     UA reflex to Microscopic and Culture     Albumin Random Urine Quantitative with Creat Ratio     TSH with free T4 reflex     Prostate Specific Antigen Screen     Hemoglobin A1c     REVIEW OF HEALTH MAINTENANCE PROTOCOL ORDERS     Comprehensive metabolic panel     Lipid panel reflex to direct LDL Fasting     UA reflex to Microscopic and Culture     Albumin Random Urine Quantitative with Creat Ratio     TSH with free T4 reflex     Prostate Specific Antigen Screen     Hemoglobin A1c     atorvastatin (LIPITOR) 40 MG tablet     lisinopril-hydrochlorothiazide (ZESTORETIC) 20-12.5 MG tablet   3. Hypertension goal BP (blood pressure) < 140/90  I10 Comprehensive metabolic panel     UA reflex to Microscopic and Culture     Albumin Random Urine Quantitative with Creat Ratio     TSH with free T4 reflex     REVIEW OF HEALTH MAINTENANCE PROTOCOL ORDERS     Comprehensive metabolic panel     UA reflex to Microscopic and Culture     Albumin Random Urine Quantitative with Creat Ratio     TSH with free T4 reflex     lisinopril-hydrochlorothiazide (ZESTORETIC) 20-12.5 MG tablet     metoprolol succinate ER (TOPROL XL) 50 MG 24 hr tablet   4. Hyperlipidemia LDL goal <100  E78.5 Comprehensive metabolic panel     Lipid panel reflex to direct LDL Fasting     CK total     REVIEW OF HEALTH MAINTENANCE PROTOCOL ORDERS     Comprehensive metabolic panel     Lipid panel reflex to direct LDL Fasting     CK total     atorvastatin (LIPITOR) 40 MG tablet     fenofibrate (TRIGLIDE/LOFIBRA) 160 MG tablet   5. Primary  osteoarthritis involving multiple joints  M89.49 REVIEW OF HEALTH MAINTENANCE PROTOCOL ORDERS   6. Psoriasis  L40.9 REVIEW OF HEALTH MAINTENANCE PROTOCOL ORDERS   7. Unilateral recurrent inguinal hernia without obstruction or gangrene  K40.91 Adult General Surg Referral   8. Screening for prostate cancer  Z12.5 Prostate Specific Antigen Screen     REVIEW OF HEALTH MAINTENANCE PROTOCOL ORDERS     Prostate Specific Antigen Screen   9. Screen for colon cancer  Z12.11 Fecal colorectal cancer screen FIT     REVIEW OF HEALTH MAINTENANCE PROTOCOL ORDERS     Fecal colorectal cancer screen FIT   10. Medication monitoring encounter  Z51.81 Comprehensive metabolic panel     Lipid panel reflex to direct LDL Fasting     CBC with platelets     CK total     UA reflex to Microscopic and Culture     Albumin Random Urine Quantitative with Creat Ratio     TSH with free T4 reflex     Hemoglobin A1c     REVIEW OF HEALTH MAINTENANCE PROTOCOL ORDERS     Comprehensive metabolic panel     Lipid panel reflex to direct LDL Fasting     CBC with platelets     CK total     UA reflex to Microscopic and Culture     Albumin Random Urine Quantitative with Creat Ratio     TSH with free T4 reflex     Hemoglobin A1c       PLAN    Discussed treatment/modality options, including risk and benefits, he desires:    advised alcohol consumption 1oz per day or less, advised 1 multivitamin per day, advised calcium 4047-3117 mg/d and Vitamin D 800-1200 IU/d, advised dentist every 6 months, advised diet and exercise, advised opthalmologist every 1-2 years, advised self testicular exam q month, further health care maintenance, further lab(s), immunization(s), medication refill(s) and observation    Discussed controversies surrounding PSA. Specifically reviewed 2017 USPSTF findings recommending discussion of PSA testing for men ages 55-69.  Reviewed findings of the  Randomized Study of Screening for Prostate Cancer which showed a 30% reduction in advanced  stage prostate cancer and a 20% reduction in death rate from prostate cancer in this age group. PSA-based screening may prevent up to 2 deaths and up to 3 cases of metastatic disease per 1,000 men screened over 13 years.    We've elected to do PSA this year after discussing these controversies.    All diagnosis above reviewed and noted above, otherwise stable.      See [facility] orders for further details.      1) medications refilled    2) labs pending    3) immunizations reviewed - tdap, shingrix, pneumovax/prevnar,     4) offered colonoscopy, declines    5) surgery consult    Return in about 1 year (around 5/25/2023) for Complete Physical, Medication Recheck Visit, Follow Up Chronic.    Health Maintenance Due   Topic Date Due     DTAP/TDAP/TD IMMUNIZATION (1 - Tdap) Never done     ZOSTER IMMUNIZATION (1 of 2) Never done     Pneumococcal Vaccine: 65+ Years (1 - PCV) Never done     COLORECTAL CANCER SCREENING  12/30/2021       COUNSELING    Reviewed preventive health counseling, as reflected in patient instructions    BP Readings from Last 1 Encounters:   05/25/22 130/82     Estimated body mass index is 26.85 kg/m  as calculated from the following:    Height as of this encounter: 1.829 m (6').    Weight as of this encounter: 89.8 kg (198 lb).           reports that he has never smoked. He has never used smokeless tobacco.      Counseling Resources:    ATP IV Guidelines  Pooled Cohorts Equation Calculator  FRAX Risk Assessment  ICSI Preventive Guidelines  Dietary Guidelines for Americans, 2010  USDA's MyPlate  ASA Prophylaxis  Lung CA Screening           Jalen Daly MD, FAAFP     [facility]  [address] [place] [id number]  [email or web address]   Office: [phone]  Fax: [phone]  Pager: [phone]     Identified Health Risks:

## 2022-05-27 ENCOUNTER — OFFICE VISIT (OUTPATIENT)
Dept: SURGERY | Facility: CLINIC | Age: 77
End: 2022-05-27
Payer: COMMERCIAL

## 2022-05-27 ENCOUNTER — TELEPHONE (OUTPATIENT)
Dept: SURGERY | Facility: CLINIC | Age: 77
End: 2022-05-27

## 2022-05-27 VITALS
SYSTOLIC BLOOD PRESSURE: 128 MMHG | DIASTOLIC BLOOD PRESSURE: 80 MMHG | HEIGHT: 72 IN | OXYGEN SATURATION: 96 % | BODY MASS INDEX: 26.82 KG/M2 | WEIGHT: 198 LBS | RESPIRATION RATE: 16 BRPM | HEART RATE: 90 BPM

## 2022-05-27 DIAGNOSIS — K40.91 RECURRENT RIGHT INGUINAL HERNIA: Primary | ICD-10-CM

## 2022-05-27 PROCEDURE — 99203 OFFICE O/P NEW LOW 30 MIN: CPT | Performed by: SURGERY

## 2022-05-27 NOTE — PROGRESS NOTES
Terry is a 76 year old male who presents for evaluation for inguinal hernia.  Symptoms began over this past winter but may have begun last summer when he and his wife moved houses. This is described as aching and burning and is located in the right groin. It is made worse with activity such as walking or being on his feet for periods of time. The patient has noticed a bulge which has gotten larger in the interim. Pt has had previous abdominal surgery including an open right inguinal hernia repair with mesh at [facility] in the 1980's. He also had an ope cholecystectomy in the 1970's. 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    Constipation No   Dysuria No  Cough No    Pt's chart has been reviewed for PMH, PSH, allergies, medications, social history and family history.    ROS:  Pulm:  No shortness of breath, dyspnea on exertion, cough, or hemoptysis  CV:  negative  ABD:  See chief complaint  :  Negative  All other systems negative/normal    There were no vitals taken for this visit.  Physical exam:  Patient able to get up on table without difficulty.  Abdomen is abdomen is soft without significant tenderness, masses, organomegaly or guarding  bowel sounds are positive and no caput medusa noted. There is a well healed Kocher scar in the RUQ.  Hernia  Left inguinal hernia is not present with valsalva              Right inguinal hernia is present with valsalva, there is a healed scar in the right groin as anticipated              The hernia is reducible              Testicles are normal    Assesment: right inguinal hernia, symptomatic and recurrent    Plan: The nature of hernias, anatomic considerations, indications and approaches to repair were discussed.  Risks of operative intervention were discussed including infection, bleeding, harm to structures including vessels and nerves as well as recurrence, which is  about 5% per decade of life.  Post operative recuperation and activity limitations were reviewed as well.  The patient is interested in pursuing repair and we will assist in scheduling this with him. Given that this is a recurrent hernia after prior open mesh repair, I've recommended laparoscopic robotic assisted repair to reduce additional recurrence risk and cord injury.      Charles Saravia MD  5/27/2022 1:23 PM    Please route or send letter to:  Primary Care Provider (PCP)

## 2022-05-27 NOTE — LETTER
[facility]    [address], [address]  [place], [place] [address]  Phone [phone]  Fax [phone] [address], [address]  [place] [address]  Phone [phone]  Fax [phone]    [email or web address]   Terry Verduzco      You have been scheduled for a COVID-19 testing appointment at [facility].    6/21/2022 at 2:00 PM     The clinic is located at:  [address] [place], [place] [address]    Please wear a mask or face covering to the testing site.      Following your testing, you will be required to self-isolate until your surgery.  If you need a note for your employer due to this, please let us know.

## 2022-05-27 NOTE — LETTER
[facility]    [address], [address]  [place], [place] [address]  Phone [phone]  Fax [phone] [address], [address]  [place] [address]  Phone [phone]  Fax [phone]    [email or web address]   May 27, 2022    Terry Verduzco  [address] [facility] [address]    We realize with scheduling surgery, one of your first questions is, how much will this cost?  Below we have provided you with the information you will need to receive an estimate for your surgery.    You are scheduled for the following procedure:  ROBOTIC ASSISTED RIGHT INGUINAL HERNIA REPAIR WITH MESH         Surgeon:  MADONNA MARQUEZ MD       Physician Assistant:  Yes      Please make sure to have your insurance card available at the time of calling.    Surgeon & Physician Assistant charges and facility charges for [facility], [facility] or [facility]:    Consumer Price Line at [phone]   -  It is important to note that there may be a Physician Assistant assisting with your surgery.  Please be sure to mention this when calling for the estimate.      If you prefer, you can also request a price estimate online by completing the secure form at:  [email or web address]    Facility Charges at [facility], [facility] or [facility]:  [facility] at [phone]  [facility] at [phone]  [facility] at [phone] or [phone]    Anesthesiologist Charges:  [facility] at [phone]    [facility] - Nurse Anesthetist Charges:  [facility] at [phone]

## 2022-05-27 NOTE — LETTER
May 27, 2022    RE: Terry Verduzco, : 1945      Terry is a 76 year old male who presents for evaluation for inguinal hernia.  Symptoms began over this past winter but may have begun last summer when he and his wife moved houses. This is described as aching and burning and is located in the right groin. It is made worse with activity such as walking or being on his feet for periods of time. The patient has noticed a bulge which has gotten larger in the interim. Pt has had previous abdominal surgery including an open right inguinal hernia repair with mesh at the [facility] in the . He also had an ope cholecystectomy in the . 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     Constipation No   Dysuria No  Cough No     Pt's chart has been reviewed for PMH, PSH, allergies, medications, social history and family history.     ROS:  Pulm:  No shortness of breath, dyspnea on exertion, cough, or hemoptysis  CV:  negative  ABD:  See chief complaint  :  Negative  All other systems negative/normal     There were no vitals taken for this visit.  Physical exam:  Patient able to get up on table without difficulty.  Abdomen is abdomen is soft without significant tenderness, masses, organomegaly or guarding  bowel sounds are positive and no caput medusa noted. There is a well healed Kocher scar in the RUQ.  Hernia  Left inguinal hernia is not present with valsalva              Right inguinal hernia is present with valsalva, there is a healed scar in the right groin as anticipated              The hernia is reducible              Testicles are normal     Assesment: right inguinal hernia, symptomatic and recurrent     Plan: The nature of hernias, anatomic considerations, indications and approaches to repair were discussed.  Risks of operative intervention were discussed including infection, bleeding, harm to structures  including vessels and nerves as well as recurrence, which is about 5% per decade of life.  Post operative recuperation and activity limitations were reviewed as well.  The patient is interested in pursuing repair and we will assist in scheduling this with him. Given that this is a recurrent hernia after prior open mesh repair, I've recommended laparoscopic robotic assisted repair to reduce additional recurrence risk and cord injury.        Charles Saravia MD

## 2022-05-28 LAB — HEMOCCULT STL QL IA: POSITIVE

## 2022-05-28 PROCEDURE — 82274 ASSAY TEST FOR BLOOD FECAL: CPT | Performed by: FAMILY MEDICINE

## 2022-05-31 DIAGNOSIS — K40.91 UNILATERAL RECURRENT INGUINAL HERNIA WITHOUT OBSTRUCTION OR GANGRENE: Primary | ICD-10-CM

## 2022-05-31 DIAGNOSIS — R19.5 POSITIVE FIT (FECAL IMMUNOCHEMICAL TEST): ICD-10-CM

## 2022-05-31 DIAGNOSIS — E78.5 HYPERLIPIDEMIA LDL GOAL <100: ICD-10-CM

## 2022-05-31 DIAGNOSIS — R73.03 PREDIABETES: ICD-10-CM

## 2022-05-31 RX ORDER — ATORVASTATIN CALCIUM 80 MG/1
80 TABLET, FILM COATED ORAL DAILY
Qty: 90 TABLET | Refills: 3 | Status: SHIPPED | OUTPATIENT
Start: 2022-05-31 | End: 2022-07-26

## 2022-06-13 ENCOUNTER — OFFICE VISIT (OUTPATIENT)
Dept: FAMILY MEDICINE | Facility: CLINIC | Age: 77
End: 2022-06-13
Payer: COMMERCIAL

## 2022-06-13 VITALS
TEMPERATURE: 97.9 F | DIASTOLIC BLOOD PRESSURE: 70 MMHG | HEART RATE: 107 BPM | WEIGHT: 197.2 LBS | OXYGEN SATURATION: 95 % | RESPIRATION RATE: 16 BRPM | HEIGHT: 72 IN | BODY MASS INDEX: 26.71 KG/M2 | SYSTOLIC BLOOD PRESSURE: 120 MMHG

## 2022-06-13 DIAGNOSIS — K40.91 UNILATERAL RECURRENT INGUINAL HERNIA WITHOUT OBSTRUCTION OR GANGRENE: ICD-10-CM

## 2022-06-13 DIAGNOSIS — I10 HYPERTENSION GOAL BP (BLOOD PRESSURE) < 140/90: ICD-10-CM

## 2022-06-13 DIAGNOSIS — E78.5 HYPERLIPIDEMIA LDL GOAL <100: ICD-10-CM

## 2022-06-13 DIAGNOSIS — R73.03 PREDIABETES: ICD-10-CM

## 2022-06-13 DIAGNOSIS — Z01.818 PREOPERATIVE EXAMINATION: Primary | ICD-10-CM

## 2022-06-13 DIAGNOSIS — Z23 NEED FOR TDAP VACCINATION: ICD-10-CM

## 2022-06-13 PROCEDURE — 90715 TDAP VACCINE 7 YRS/> IM: CPT | Performed by: FAMILY MEDICINE

## 2022-06-13 PROCEDURE — 99214 OFFICE O/P EST MOD 30 MIN: CPT | Mod: 25 | Performed by: FAMILY MEDICINE

## 2022-06-13 PROCEDURE — 90471 IMMUNIZATION ADMIN: CPT | Performed by: FAMILY MEDICINE

## 2022-06-13 PROCEDURE — 93000 ELECTROCARDIOGRAM COMPLETE: CPT | Performed by: FAMILY MEDICINE

## 2022-06-13 NOTE — PATIENT INSTRUCTIONS
Okay to take all medications as prescribed on the day of surgery with the following exception:  Hold lisinopril-hydrochlorothiazide. Resume after surgery.

## 2022-06-13 NOTE — PROGRESS NOTES
[address] [address]  Phone: [phone]  Primary Provider: Jalen Daly  Pre-op Performing Provider: ALVINO ROSS      PREOPERATIVE EVALUATION:  Today's date: 6/13/2022    Terry Verduzco is a 77 year old male who presents for a preoperative evaluation.    Surgical Information:  Surgery/Procedure: Xi Robotic Assisted HERNIORRHAPHY, INGUINAL, LAPAROSCOPIC, WITH MESH - Right  Surgery Location: [facility]  Surgeon: Dr. Charles Elliott  Surgery Date: 6/24/2022  Time of Surgery: 8:15 Am  Where patient plans to recover: At home with family  Fax number for surgical facility: Note does not need to be faxed, will be available electronically in Epic.    Type of Anesthesia Anticipated: General    Assessment & Plan     The proposed surgical procedure is considered INTERMEDIATE risk.    Preoperative examination  - EKG 12-lead complete w/read - Clinics    Unilateral recurrent inguinal hernia without obstruction or gangrene    Need for Tdap vaccination  - TDAP VACCINE (Adacel, Boostrix)    Hypertension goal BP (blood pressure) < 140/90: well controlled.    Hyperlipidemia LDL goal <100    Prediabetes           Risks and Recommendations:  The patient has the following additional risks and recommendations for perioperative complications:   - No identified additional risk factors other than previously addressed    Medication Instructions:   - ACE/ARB: HOLD due to exceptional risk of hypotension during surgery.   - Beta Blockers: Continue taking on the day of surgery.   - Statins: Continue taking on the day of surgery.     RECOMMENDATION:  APPROVAL GIVEN to proceed with proposed procedure, without further diagnostic evaluation.        Subjective     HPI related to upcoming procedure: history of open right inguinal hernia repair in the 1980s. Over the summer, noticed aching and burning in the right groin that is worse     Preop Questions 6/6/2022   1.  Have you ever had a heart attack or stroke? No   2. Have you ever had surgery on your heart or blood vessels, such as a stent placement, a coronary artery bypass, or surgery on an artery in your head, neck, heart, or legs? No   3. Do you have chest pain with activity? No   4. Do you have a history of  heart failure? No   5. Do you currently have a cold, bronchitis or symptoms of other infection? No   6. Do you have a cough, shortness of breath, or wheezing? No   7. Do you or anyone in your family have previous history of blood clots? No   8. Do you or does anyone in your family have a serious bleeding problem such as prolonged bleeding following surgeries or cuts? No   9. Have you ever had problems with anemia or been told to take iron pills? No   10. Have you had any abnormal blood loss such as black, tarry or bloody stools? No   11. Have you ever had a blood transfusion? No   12. Are you willing to have a blood transfusion if it is medically needed before, during, or after your surgery? Yes   13. Have you or any of your relatives ever had problems with anesthesia? No   14. Do you have sleep apnea, excessive snoring or daytime drowsiness? No   15. Do you have any artifical heart valves or other implanted medical devices like a pacemaker, defibrillator, or continuous glucose monitor? No   16. Do you have artificial joints? No   17. Are you allergic to latex? No       Health Care Directive:  Patient does not have a Health Care Directive or Living Will: Discussed advance care planning with patient; however, patient declined at this time.    Preoperative Review of :   reviewed - no record of controlled substances prescribed.      Status of Chronic Conditions:  See problem list for active medical problems.  Problems all longstanding and stable, except as noted/documented.  See ROS for pertinent symptoms related to these conditions.      Review of Systems  CONSTITUTIONAL: NEGATIVE for fever, chills, change in  weight  INTEGUMENTARY/SKIN: NEGATIVE for worrisome rashes, moles or lesions  EYES: NEGATIVE for vision changes or irritation  ENT/MOUTH: NEGATIVE for ear, mouth and throat problems  RESP: NEGATIVE for significant cough or SOB  CV: NEGATIVE for chest pain, palpitations or peripheral edema  GI: NEGATIVE for nausea, abdominal pain, heartburn, or change in bowel habits  : NEGATIVE for frequency, dysuria, or hematuria  MUSCULOSKELETAL: NEGATIVE for significant arthralgias or myalgia  NEURO: NEGATIVE for weakness, dizziness or paresthesias  ENDOCRINE: NEGATIVE for temperature intolerance, skin/hair changes  HEME: NEGATIVE for bleeding problems  PSYCHIATRIC: NEGATIVE for changes in mood or affect    Patient Active Problem List    Diagnosis Date Noted     Primary osteoarthritis involving multiple joints      Priority: Medium     Prediabetes 01/01/2021     Priority: Medium     Hypertension goal BP (blood pressure) < 140/90      Priority: Medium     Psoriasis      Priority: Medium     Hyperlipidemia LDL goal <100      Priority: Medium      Past Medical History:   Diagnosis Date     Hyperlipidemia LDL goal <100      Hypertension goal BP (blood pressure) < 140/90      Prediabetes 01/01/2021     Primary osteoarthritis involving multiple joints      Psoriasis      Past Surgical History:   Procedure Laterality Date     SURGICAL HISTORY OF -  Right 1982    right inguinal hernia     SURGICAL HISTORY OF -   1985    cholecystectomy     SURGICAL HISTORY OF -  Left 1964    left testicle removed secondary to injury     SURGICAL HISTORY OF -  Left 01/2021    Left eye - Dr Lees - Macular Hole and Epiretinal Membrane     SURGICAL HISTORY OF -   1997    Right Inguinal hernia     SURGICAL HISTORY OF -  Left 07/2021    Left IOL - Dr Becerra     Current Outpatient Medications   Medication Sig Dispense Refill     atorvastatin (LIPITOR) 80 MG tablet Take 1 tablet (80 mg) by mouth daily 90 tablet 3     cholecalciferol (VITAMIN D3) 25 mcg (1000  units) capsule Take 1 capsule by mouth daily       fenofibrate (TRIGLIDE/LOFIBRA) 160 MG tablet Take 1 tablet (160 mg) by mouth daily 90 tablet 3     glucosamine-chondroitin 500-400 MG CAPS per capsule Take 1 capsule by mouth daily       lisinopril-hydrochlorothiazide (ZESTORETIC) 20-12.5 MG tablet Take 1 tablet by mouth daily 90 tablet 3     metoprolol succinate ER (TOPROL XL) 50 MG 24 hr tablet Take 1 tablet (50 mg) by mouth daily 90 tablet 3     triamcinolone (KENALOG) 0.5 % external ointment APPLY TOPICALLY TWO TIMES ADAY 120 g 1       Allergies   Allergen Reactions     Codeine      Tachycardia     Demerol [Meperidine] Rash        Social History     Tobacco Use     Smoking status: Never Smoker     Smokeless tobacco: Never Used   Substance Use Topics     Alcohol use: Not Currently     Family History   Problem Relation Age of Onset     Hypertension Mother      Parkinsonism Father      Parkinsonism Brother      Dementia Maternal Grandfather      Cancer Brother         eye cancer, blastomycosis     Cerebrovascular Disease Brother         bike accident in MVA     History   Drug Use Unknown         Objective     /70   Pulse 107   Temp 97.9  F (36.6  C) (Tympanic)   Resp 16   Ht 1.829 m (6')   Wt 89.4 kg (197 lb 3.2 oz)   SpO2 95%   BMI 26.75 kg/m      Physical Exam    GENERAL APPEARANCE: healthy, alert and no distress     EYES: EOMI,  PERRL     HENT: ear canals and TM's normal and nose and mouth without ulcers or lesions     NECK: no adenopathy, no asymmetry, masses, or scars and thyroid normal to palpation     RESP: lungs clear to auscultation - no rales, rhonchi or wheezes     CV: regular rates and rhythm, normal S1 S2, no S3 or S4 and no murmur, click or rub     MS: extremities normal- no gross deformities noted, no evidence of inflammation in joints, FROM in all extremities.     SKIN: no suspicious lesions or rashes     NEURO: Normal strength and tone, sensory exam grossly normal, mentation intact and  speech normal     PSYCH: mentation appears normal. and affect normal/bright     LYMPHATICS: No cervical adenopathy    Recent Labs   Lab Test 05/25/22  1008 07/05/21  1031 03/03/21  1005   HGB 14.7  --  16.4     --  291    136 139   POTASSIUM 4.3 4.3 4.5   CR 1.45* 1.56* 1.45*   A1C 5.8* 5.6 6.0*        Diagnostics:  Recent Results (from the past 720 hour(s))   Comprehensive metabolic panel    Collection Time: 05/25/22 10:08 AM   Result Value Ref Range    Sodium 139 133 - 144 mmol/L    Potassium 4.3 3.4 - 5.3 mmol/L    Chloride 107 94 - 109 mmol/L    Carbon Dioxide (CO2) 25 20 - 32 mmol/L    Anion Gap 7 3 - 14 mmol/L    Urea Nitrogen 29 7 - 30 mg/dL    Creatinine 1.45 (H) 0.66 - 1.25 mg/dL    Calcium 9.5 8.5 - 10.1 mg/dL    Glucose 123 (H) 70 - 99 mg/dL    Alkaline Phosphatase 59 40 - 150 U/L    AST 31 0 - 45 U/L    ALT 29 0 - 70 U/L    Protein Total 7.6 6.8 - 8.8 g/dL    Albumin 3.8 3.4 - 5.0 g/dL    Bilirubin Total 1.0 0.2 - 1.3 mg/dL    GFR Estimate 50 (L) >60 mL/min/1.73m2   Lipid panel reflex to direct LDL Fasting    Collection Time: 05/25/22 10:08 AM   Result Value Ref Range    Cholesterol 203 (H) <200 mg/dL    Triglycerides 134 <150 mg/dL    Direct Measure HDL 45 >=40 mg/dL    LDL Cholesterol Calculated 131 (H) <=100 mg/dL    Non HDL Cholesterol 158 (H) <130 mg/dL    Patient Fasting > 8hrs? Yes    CBC with platelets    Collection Time: 05/25/22 10:08 AM   Result Value Ref Range    WBC Count 6.2 4.0 - 11.0 10e3/uL    RBC Count 4.84 4.40 - 5.90 10e6/uL    Hemoglobin 14.7 13.3 - 17.7 g/dL    Hematocrit 43.9 40.0 - 53.0 %    MCV 91 78 - 100 fL    MCH 30.4 26.5 - 33.0 pg    MCHC 33.5 31.5 - 36.5 g/dL    RDW 12.6 10.0 - 15.0 %    Platelet Count 261 150 - 450 10e3/uL   CK total    Collection Time: 05/25/22 10:08 AM   Result Value Ref Range     30 - 300 U/L   TSH with free T4 reflex    Collection Time: 05/25/22 10:08 AM   Result Value Ref Range    TSH 1.28 0.40 - 4.00 mU/L   Prostate Specific  Antigen Screen    Collection Time: 05/25/22 10:08 AM   Result Value Ref Range    Prostate Specific Antigen Screen 3.04 0.00 - 4.00 ug/L   Hemoglobin A1c    Collection Time: 05/25/22 10:08 AM   Result Value Ref Range    Hemoglobin A1C 5.8 (H) 0.0 - 5.6 %   Albumin Random Urine Quantitative with Creat Ratio    Collection Time: 05/25/22 10:13 AM   Result Value Ref Range    Creatinine Urine mg/dL 192 mg/dL    Albumin Urine mg/L 12 mg/L    Albumin Urine mg/g Cr 6.25 0.00 - 17.00 mg/g Cr   UA reflex to Microscopic and Culture    Collection Time: 05/25/22 10:14 AM    Specimen: Urine, Midstream   Result Value Ref Range    Color Urine Yellow Colorless, Straw, Light Yellow, Yellow    Appearance Urine Clear Clear    Glucose Urine Negative Negative mg/dL    Bilirubin Urine Negative Negative    Ketones Urine Negative Negative mg/dL    Specific Gravity Urine 1.025 1.003 - 1.035    Blood Urine Negative Negative    pH Urine 5.5 5.0 - 7.0    Protein Albumin Urine Negative Negative mg/dL    Urobilinogen Urine 0.2 0.2, 1.0 E.U./dL    Nitrite Urine Negative Negative    Leukocyte Esterase Urine Negative Negative   Fecal colorectal cancer screen FIT    Collection Time: 05/28/22 12:09 PM   Result Value Ref Range    Occult Blood Screen FIT Positive (A) Negative      EKG: appears normal, NSR, normal axis, normal intervals, no acute ST/T changes c/w ischemia, no LVH by voltage criteria, unchanged from previous tracings    Revised Cardiac Risk Index (RCRI):  The patient has the following serious cardiovascular risks for perioperative complications:   - No serious cardiac risks = 0 points     RCRI Interpretation: 0 points: Class I (very low risk - 0.4% complication rate)           Signed Electronically by: Addy Campos DO  Copy of this evaluation report is provided to requesting physician.

## 2022-06-16 DIAGNOSIS — K40.91 RECURRENT RIGHT INGUINAL HERNIA: Primary | ICD-10-CM

## 2022-06-21 ENCOUNTER — LAB (OUTPATIENT)
Dept: LAB | Facility: CLINIC | Age: 77
End: 2022-06-21
Payer: COMMERCIAL

## 2022-06-21 DIAGNOSIS — K40.91 RECURRENT RIGHT INGUINAL HERNIA: ICD-10-CM

## 2022-06-21 PROCEDURE — U0003 INFECTIOUS AGENT DETECTION BY NUCLEIC ACID (DNA OR RNA); SEVERE ACUTE RESPIRATORY SYNDROME CORONAVIRUS 2 (SARS-COV-2) (CORONAVIRUS DISEASE [COVID-19]), AMPLIFIED PROBE TECHNIQUE, MAKING USE OF HIGH THROUGHPUT TECHNOLOGIES AS DESCRIBED BY CMS-2020-01-R: HCPCS

## 2022-06-21 PROCEDURE — U0005 INFEC AGEN DETEC AMPLI PROBE: HCPCS

## 2022-06-22 LAB — SARS-COV-2 RNA RESP QL NAA+PROBE: NEGATIVE

## 2022-06-24 ENCOUNTER — ANESTHESIA EVENT (OUTPATIENT)
Dept: SURGERY | Facility: CLINIC | Age: 77
End: 2022-06-24
Payer: COMMERCIAL

## 2022-06-24 ENCOUNTER — HOSPITAL ENCOUNTER (OUTPATIENT)
Facility: CLINIC | Age: 77
Setting detail: OBSERVATION
Discharge: HOME | End: 2022-06-25
Attending: EMERGENCY MEDICINE | Admitting: EMERGENCY MEDICINE
Payer: COMMERCIAL

## 2022-06-24 ENCOUNTER — HOSPITAL ENCOUNTER (OUTPATIENT)
Facility: CLINIC | Age: 77
Discharge: HOME | End: 2022-06-24
Attending: SURGERY | Admitting: SURGERY
Payer: COMMERCIAL

## 2022-06-24 ENCOUNTER — NURSE TRIAGE (OUTPATIENT)
Dept: NURSING | Facility: CLINIC | Age: 77
End: 2022-06-24

## 2022-06-24 ENCOUNTER — ANESTHESIA (OUTPATIENT)
Dept: SURGERY | Facility: CLINIC | Age: 77
End: 2022-06-24
Payer: COMMERCIAL

## 2022-06-24 VITALS
HEART RATE: 88 BPM | OXYGEN SATURATION: 94 % | HEIGHT: 72 IN | RESPIRATION RATE: 16 BRPM | BODY MASS INDEX: 26.36 KG/M2 | SYSTOLIC BLOOD PRESSURE: 158 MMHG | TEMPERATURE: 96.9 F | DIASTOLIC BLOOD PRESSURE: 93 MMHG | WEIGHT: 194.6 LBS

## 2022-06-24 DIAGNOSIS — D72.829 LEUKOCYTOSIS, UNSPECIFIED TYPE: ICD-10-CM

## 2022-06-24 DIAGNOSIS — E87.1 HYPONATREMIA: ICD-10-CM

## 2022-06-24 DIAGNOSIS — R73.03 PREDIABETES: ICD-10-CM

## 2022-06-24 DIAGNOSIS — K38.9 DISORDER OF APPENDIX: Primary | ICD-10-CM

## 2022-06-24 DIAGNOSIS — K40.91 RECURRENT RIGHT INGUINAL HERNIA: ICD-10-CM

## 2022-06-24 DIAGNOSIS — I10 HYPERTENSION GOAL BP (BLOOD PRESSURE) < 140/90: ICD-10-CM

## 2022-06-24 DIAGNOSIS — R33.9 URINARY RETENTION: ICD-10-CM

## 2022-06-24 LAB
ALBUMIN UR-MCNC: NEGATIVE MG/DL
ANION GAP SERPL CALCULATED.3IONS-SCNC: 12 MMOL/L (ref 3–14)
APPEARANCE UR: CLEAR
BACTERIA #/AREA URNS HPF: ABNORMAL /HPF
BASOPHILS # BLD AUTO: 0 10E3/UL (ref 0–0.2)
BASOPHILS NFR BLD AUTO: 0 %
BILIRUB UR QL STRIP: NEGATIVE
BUN SERPL-MCNC: 37 MG/DL (ref 7–30)
CALCIUM SERPL-MCNC: 9.2 MG/DL (ref 8.5–10.1)
CHLORIDE BLD-SCNC: 90 MMOL/L (ref 94–109)
CO2 SERPL-SCNC: 22 MMOL/L (ref 20–32)
COLOR UR AUTO: ABNORMAL
CREAT SERPL-MCNC: 1.41 MG/DL (ref 0.66–1.25)
EOSINOPHIL # BLD AUTO: 0 10E3/UL (ref 0–0.7)
EOSINOPHIL NFR BLD AUTO: 0 %
ERYTHROCYTE [DISTWIDTH] IN BLOOD BY AUTOMATED COUNT: 12 % (ref 10–15)
GFR SERPL CREATININE-BSD FRML MDRD: 52 ML/MIN/1.73M2
GLUCOSE BLD-MCNC: 130 MG/DL (ref 70–99)
GLUCOSE BLDC GLUCOMTR-MCNC: 137 MG/DL (ref 70–99)
GLUCOSE UR STRIP-MCNC: NEGATIVE MG/DL
HCT VFR BLD AUTO: 40.8 % (ref 40–53)
HGB BLD-MCNC: 13.9 G/DL (ref 13.3–17.7)
HGB UR QL STRIP: ABNORMAL
IMM GRANULOCYTES # BLD: 0 10E3/UL
IMM GRANULOCYTES NFR BLD: 0 %
KETONES UR STRIP-MCNC: NEGATIVE MG/DL
LEUKOCYTE ESTERASE UR QL STRIP: NEGATIVE
LYMPHOCYTES # BLD AUTO: 1.1 10E3/UL (ref 0.8–5.3)
LYMPHOCYTES NFR BLD AUTO: 9 %
MCH RBC QN AUTO: 31 PG (ref 26.5–33)
MCHC RBC AUTO-ENTMCNC: 34.1 G/DL (ref 31.5–36.5)
MCV RBC AUTO: 91 FL (ref 78–100)
MONOCYTES # BLD AUTO: 0.8 10E3/UL (ref 0–1.3)
MONOCYTES NFR BLD AUTO: 7 %
NEUTROPHILS # BLD AUTO: 10.1 10E3/UL (ref 1.6–8.3)
NEUTROPHILS NFR BLD AUTO: 84 %
NITRATE UR QL: NEGATIVE
NRBC # BLD AUTO: 0 10E3/UL
NRBC BLD AUTO-RTO: 0 /100
PH UR STRIP: 5.5 [PH] (ref 5–7)
PLATELET # BLD AUTO: 200 10E3/UL (ref 150–450)
POTASSIUM BLD-SCNC: 4.9 MMOL/L (ref 3.4–5.3)
RBC # BLD AUTO: 4.49 10E6/UL (ref 4.4–5.9)
RBC URINE: 25 /HPF
SODIUM SERPL-SCNC: 124 MMOL/L (ref 133–144)
SP GR UR STRIP: 1.01 (ref 1–1.03)
UROBILINOGEN UR STRIP-MCNC: NORMAL MG/DL
WBC # BLD AUTO: 12 10E3/UL (ref 4–11)
WBC URINE: 3 /HPF

## 2022-06-24 PROCEDURE — 82310 ASSAY OF CALCIUM: CPT | Performed by: EMERGENCY MEDICINE

## 2022-06-24 PROCEDURE — 258N000003 HC RX IP 258 OP 636: Performed by: ANESTHESIOLOGY

## 2022-06-24 PROCEDURE — 250N000011 HC RX IP 250 OP 636: Performed by: NURSE ANESTHETIST, CERTIFIED REGISTERED

## 2022-06-24 PROCEDURE — 93005 ELECTROCARDIOGRAM TRACING: CPT

## 2022-06-24 PROCEDURE — 84300 ASSAY OF URINE SODIUM: CPT | Performed by: INTERNAL MEDICINE

## 2022-06-24 PROCEDURE — 258N000003 HC RX IP 258 OP 636: Performed by: EMERGENCY MEDICINE

## 2022-06-24 PROCEDURE — 999N000141 HC STATISTIC PRE-PROCEDURE NURSING ASSESSMENT: Performed by: SURGERY

## 2022-06-24 PROCEDURE — 83930 ASSAY OF BLOOD OSMOLALITY: CPT | Performed by: INTERNAL MEDICINE

## 2022-06-24 PROCEDURE — 360N000080 HC SURGERY LEVEL 7, PER MIN: Performed by: SURGERY

## 2022-06-24 PROCEDURE — 250N000011 HC RX IP 250 OP 636: Performed by: SURGERY

## 2022-06-24 PROCEDURE — 250N000013 HC RX MED GY IP 250 OP 250 PS 637: Performed by: SURGERY

## 2022-06-24 PROCEDURE — 96361 HYDRATE IV INFUSION ADD-ON: CPT

## 2022-06-24 PROCEDURE — 82962 GLUCOSE BLOOD TEST: CPT

## 2022-06-24 PROCEDURE — 710N000012 HC RECOVERY PHASE 2, PER MINUTE: Performed by: SURGERY

## 2022-06-24 PROCEDURE — C1781 MESH (IMPLANTABLE): HCPCS | Performed by: SURGERY

## 2022-06-24 PROCEDURE — 96360 HYDRATION IV INFUSION INIT: CPT | Mod: 59

## 2022-06-24 PROCEDURE — 250N000009 HC RX 250: Performed by: SURGERY

## 2022-06-24 PROCEDURE — 370N000017 HC ANESTHESIA TECHNICAL FEE, PER MIN: Performed by: SURGERY

## 2022-06-24 PROCEDURE — C9803 HOPD COVID-19 SPEC COLLECT: HCPCS

## 2022-06-24 PROCEDURE — S2900 ROBOTIC SURGICAL SYSTEM: HCPCS | Performed by: SURGERY

## 2022-06-24 PROCEDURE — 36415 COLL VENOUS BLD VENIPUNCTURE: CPT | Performed by: EMERGENCY MEDICINE

## 2022-06-24 PROCEDURE — 49651 LAP ING HERNIA REPAIR RECUR: CPT | Mod: AS | Performed by: PHYSICIAN ASSISTANT

## 2022-06-24 PROCEDURE — 81001 URINALYSIS AUTO W/SCOPE: CPT | Performed by: EMERGENCY MEDICINE

## 2022-06-24 PROCEDURE — 99285 EMERGENCY DEPT VISIT HI MDM: CPT | Mod: 25

## 2022-06-24 PROCEDURE — 258N000003 HC RX IP 258 OP 636: Performed by: NURSE ANESTHETIST, CERTIFIED REGISTERED

## 2022-06-24 PROCEDURE — 51702 INSERT TEMP BLADDER CATH: CPT

## 2022-06-24 PROCEDURE — 85025 COMPLETE CBC W/AUTO DIFF WBC: CPT | Performed by: EMERGENCY MEDICINE

## 2022-06-24 PROCEDURE — 83935 ASSAY OF URINE OSMOLALITY: CPT | Performed by: INTERNAL MEDICINE

## 2022-06-24 PROCEDURE — 272N000001 HC OR GENERAL SUPPLY STERILE: Performed by: SURGERY

## 2022-06-24 PROCEDURE — 250N000009 HC RX 250: Performed by: NURSE ANESTHETIST, CERTIFIED REGISTERED

## 2022-06-24 PROCEDURE — 710N000009 HC RECOVERY PHASE 1, LEVEL 1, PER MIN: Performed by: SURGERY

## 2022-06-24 PROCEDURE — 49651 LAP ING HERNIA REPAIR RECUR: CPT | Mod: RT | Performed by: SURGERY

## 2022-06-24 DEVICE — MESH PROGRIP LAPAROSCOPIC 5.9X3.9" PARIETEX SELF-FIX LPG1510: Type: IMPLANTABLE DEVICE | Site: INGUINAL | Status: FUNCTIONAL

## 2022-06-24 RX ORDER — LIDOCAINE HYDROCHLORIDE 10 MG/ML
INJECTION, SOLUTION INFILTRATION; PERINEURAL PRN
Status: DISCONTINUED | OUTPATIENT
Start: 2022-06-24 | End: 2022-06-24

## 2022-06-24 RX ORDER — SODIUM CHLORIDE, SODIUM LACTATE, POTASSIUM CHLORIDE, CALCIUM CHLORIDE 600; 310; 30; 20 MG/100ML; MG/100ML; MG/100ML; MG/100ML
INJECTION, SOLUTION INTRAVENOUS CONTINUOUS
Status: DISCONTINUED | OUTPATIENT
Start: 2022-06-24 | End: 2022-06-24 | Stop reason: HOSPADM

## 2022-06-24 RX ORDER — HYDROCODONE BITARTRATE AND ACETAMINOPHEN 5; 325 MG/1; MG/1
1-2 TABLET ORAL EVERY 4 HOURS PRN
Qty: 10 TABLET | Refills: 0 | Status: SHIPPED | OUTPATIENT
Start: 2022-06-24 | End: 2022-07-15

## 2022-06-24 RX ORDER — CEFAZOLIN SODIUM/WATER 2 G/20 ML
2 SYRINGE (ML) INTRAVENOUS SEE ADMIN INSTRUCTIONS
Status: DISCONTINUED | OUTPATIENT
Start: 2022-06-24 | End: 2022-06-24 | Stop reason: HOSPADM

## 2022-06-24 RX ORDER — PROPOFOL 10 MG/ML
INJECTION, EMULSION INTRAVENOUS CONTINUOUS PRN
Status: DISCONTINUED | OUTPATIENT
Start: 2022-06-24 | End: 2022-06-24

## 2022-06-24 RX ORDER — FENTANYL CITRATE 50 UG/ML
25 INJECTION, SOLUTION INTRAMUSCULAR; INTRAVENOUS EVERY 5 MIN PRN
Status: DISCONTINUED | OUTPATIENT
Start: 2022-06-24 | End: 2022-06-24 | Stop reason: HOSPADM

## 2022-06-24 RX ORDER — PROPOFOL 10 MG/ML
INJECTION, EMULSION INTRAVENOUS PRN
Status: DISCONTINUED | OUTPATIENT
Start: 2022-06-24 | End: 2022-06-24

## 2022-06-24 RX ORDER — FENTANYL CITRATE 50 UG/ML
25 INJECTION, SOLUTION INTRAMUSCULAR; INTRAVENOUS
Status: DISCONTINUED | OUTPATIENT
Start: 2022-06-24 | End: 2022-06-24 | Stop reason: HOSPADM

## 2022-06-24 RX ORDER — CEFAZOLIN SODIUM/WATER 2 G/20 ML
2 SYRINGE (ML) INTRAVENOUS
Status: COMPLETED | OUTPATIENT
Start: 2022-06-24 | End: 2022-06-24

## 2022-06-24 RX ORDER — HYDROMORPHONE HCL IN WATER/PF 6 MG/30 ML
0.4 PATIENT CONTROLLED ANALGESIA SYRINGE INTRAVENOUS EVERY 5 MIN PRN
Status: DISCONTINUED | OUTPATIENT
Start: 2022-06-24 | End: 2022-06-24 | Stop reason: HOSPADM

## 2022-06-24 RX ORDER — HYDROCODONE BITARTRATE AND ACETAMINOPHEN 5; 325 MG/1; MG/1
1 TABLET ORAL
Status: COMPLETED | OUTPATIENT
Start: 2022-06-24 | End: 2022-06-24

## 2022-06-24 RX ORDER — FENTANYL CITRATE 50 UG/ML
INJECTION, SOLUTION INTRAMUSCULAR; INTRAVENOUS PRN
Status: DISCONTINUED | OUTPATIENT
Start: 2022-06-24 | End: 2022-06-24

## 2022-06-24 RX ORDER — ONDANSETRON 2 MG/ML
4 INJECTION INTRAMUSCULAR; INTRAVENOUS EVERY 30 MIN PRN
Status: DISCONTINUED | OUTPATIENT
Start: 2022-06-24 | End: 2022-06-24 | Stop reason: HOSPADM

## 2022-06-24 RX ORDER — LABETALOL HYDROCHLORIDE 5 MG/ML
10 INJECTION, SOLUTION INTRAVENOUS EVERY 10 MIN PRN
Status: DISCONTINUED | OUTPATIENT
Start: 2022-06-24 | End: 2022-06-24 | Stop reason: HOSPADM

## 2022-06-24 RX ORDER — GLYCOPYRROLATE 0.2 MG/ML
INJECTION, SOLUTION INTRAMUSCULAR; INTRAVENOUS PRN
Status: DISCONTINUED | OUTPATIENT
Start: 2022-06-24 | End: 2022-06-24

## 2022-06-24 RX ORDER — ONDANSETRON 4 MG/1
4 TABLET, ORALLY DISINTEGRATING ORAL EVERY 30 MIN PRN
Status: DISCONTINUED | OUTPATIENT
Start: 2022-06-24 | End: 2022-06-24 | Stop reason: HOSPADM

## 2022-06-24 RX ORDER — NEOSTIGMINE METHYLSULFATE 1 MG/ML
VIAL (ML) INJECTION PRN
Status: DISCONTINUED | OUTPATIENT
Start: 2022-06-24 | End: 2022-06-24

## 2022-06-24 RX ORDER — ONDANSETRON 2 MG/ML
INJECTION INTRAMUSCULAR; INTRAVENOUS PRN
Status: DISCONTINUED | OUTPATIENT
Start: 2022-06-24 | End: 2022-06-24

## 2022-06-24 RX ORDER — LIDOCAINE 40 MG/G
CREAM TOPICAL
Status: DISCONTINUED | OUTPATIENT
Start: 2022-06-24 | End: 2022-06-24 | Stop reason: HOSPADM

## 2022-06-24 RX ORDER — OXYCODONE HYDROCHLORIDE 5 MG/1
5 TABLET ORAL EVERY 4 HOURS PRN
Status: DISCONTINUED | OUTPATIENT
Start: 2022-06-24 | End: 2022-06-24 | Stop reason: HOSPADM

## 2022-06-24 RX ORDER — DEXAMETHASONE SODIUM PHOSPHATE 4 MG/ML
INJECTION, SOLUTION INTRA-ARTICULAR; INTRALESIONAL; INTRAMUSCULAR; INTRAVENOUS; SOFT TISSUE PRN
Status: DISCONTINUED | OUTPATIENT
Start: 2022-06-24 | End: 2022-06-24

## 2022-06-24 RX ORDER — HYDROCODONE BITARTRATE AND ACETAMINOPHEN 5; 325 MG/1; MG/1
1 TABLET ORAL ONCE
Status: DISCONTINUED | OUTPATIENT
Start: 2022-06-24 | End: 2022-06-25

## 2022-06-24 RX ORDER — KETOROLAC TROMETHAMINE 15 MG/ML
15 INJECTION, SOLUTION INTRAMUSCULAR; INTRAVENOUS
Status: DISCONTINUED | OUTPATIENT
Start: 2022-06-24 | End: 2022-06-24 | Stop reason: HOSPADM

## 2022-06-24 RX ORDER — BUPIVACAINE HYDROCHLORIDE 5 MG/ML
INJECTION, SOLUTION PERINEURAL PRN
Status: DISCONTINUED | OUTPATIENT
Start: 2022-06-24 | End: 2022-06-24 | Stop reason: HOSPADM

## 2022-06-24 RX ADMIN — GLYCOPYRROLATE 0.8 MG: 0.2 INJECTION, SOLUTION INTRAMUSCULAR; INTRAVENOUS at 09:45

## 2022-06-24 RX ADMIN — HYDROCODONE BITARTRATE AND ACETAMINOPHEN 1 TABLET: 5; 325 TABLET ORAL at 10:31

## 2022-06-24 RX ADMIN — PHENYLEPHRINE HYDROCHLORIDE 100 MCG: 10 INJECTION INTRAVENOUS at 09:11

## 2022-06-24 RX ADMIN — PHENYLEPHRINE HYDROCHLORIDE 100 MCG: 10 INJECTION INTRAVENOUS at 08:52

## 2022-06-24 RX ADMIN — NEOSTIGMINE METHYLSULFATE 4 MG: 1 INJECTION, SOLUTION INTRAVENOUS at 09:45

## 2022-06-24 RX ADMIN — HYDROMORPHONE HYDROCHLORIDE 1 MG: 1 INJECTION, SOLUTION INTRAMUSCULAR; INTRAVENOUS; SUBCUTANEOUS at 08:59

## 2022-06-24 RX ADMIN — PHENYLEPHRINE HYDROCHLORIDE 200 MCG: 10 INJECTION INTRAVENOUS at 08:28

## 2022-06-24 RX ADMIN — Medication 2 G: at 08:10

## 2022-06-24 RX ADMIN — FENTANYL CITRATE 50 MCG: 50 INJECTION, SOLUTION INTRAMUSCULAR; INTRAVENOUS at 08:16

## 2022-06-24 RX ADMIN — SODIUM CHLORIDE 1000 ML: 9 INJECTION, SOLUTION INTRAVENOUS at 23:18

## 2022-06-24 RX ADMIN — SODIUM CHLORIDE, POTASSIUM CHLORIDE, SODIUM LACTATE AND CALCIUM CHLORIDE: 600; 310; 30; 20 INJECTION, SOLUTION INTRAVENOUS at 09:04

## 2022-06-24 RX ADMIN — ROCURONIUM BROMIDE 50 MG: 50 INJECTION, SOLUTION INTRAVENOUS at 08:17

## 2022-06-24 RX ADMIN — PROPOFOL 50 MCG/KG/MIN: 10 INJECTION, EMULSION INTRAVENOUS at 08:22

## 2022-06-24 RX ADMIN — PHENYLEPHRINE HYDROCHLORIDE 100 MCG: 10 INJECTION INTRAVENOUS at 08:25

## 2022-06-24 RX ADMIN — LIDOCAINE HYDROCHLORIDE 50 MG: 10 INJECTION, SOLUTION INFILTRATION; PERINEURAL at 08:16

## 2022-06-24 RX ADMIN — ONDANSETRON HYDROCHLORIDE 4 MG: 2 INJECTION, SOLUTION INTRAVENOUS at 09:45

## 2022-06-24 RX ADMIN — FENTANYL CITRATE 50 MCG: 50 INJECTION, SOLUTION INTRAMUSCULAR; INTRAVENOUS at 08:35

## 2022-06-24 RX ADMIN — DEXAMETHASONE SODIUM PHOSPHATE 8 MG: 4 INJECTION, SOLUTION INTRA-ARTICULAR; INTRALESIONAL; INTRAMUSCULAR; INTRAVENOUS; SOFT TISSUE at 08:17

## 2022-06-24 RX ADMIN — SODIUM CHLORIDE, POTASSIUM CHLORIDE, SODIUM LACTATE AND CALCIUM CHLORIDE: 600; 310; 30; 20 INJECTION, SOLUTION INTRAVENOUS at 07:04

## 2022-06-24 RX ADMIN — PROPOFOL 150 MG: 10 INJECTION, EMULSION INTRAVENOUS at 08:16

## 2022-06-24 RX ADMIN — PHENYLEPHRINE HYDROCHLORIDE 100 MCG: 10 INJECTION INTRAVENOUS at 09:51

## 2022-06-24 RX ADMIN — PHENYLEPHRINE HYDROCHLORIDE 100 MCG: 10 INJECTION INTRAVENOUS at 09:28

## 2022-06-24 ASSESSMENT — ENCOUNTER SYMPTOMS
ABDOMINAL PAIN: 1
CHILLS: 0
DIFFICULTY URINATING: 1
DIARRHEA: 0
FEVER: 0
VOMITING: 0

## 2022-06-24 NOTE — ANESTHESIA CARE TRANSFER NOTE
Patient: Terry Verduzco    Procedure: Procedure(s):  Xi Robotic Assisted HERNIORRHAPHY, INGUINAL, LAPAROSCOPIC, WITH MESH - Right       Diagnosis: Recurrent right inguinal hernia [K40.91]  Diagnosis Additional Information: No value filed.    Anesthesia Type:   General     Note:    Oropharynx: oropharynx clear of all foreign objects and spontaneously breathing  Level of Consciousness: awake  Oxygen Supplementation: face mask  Level of Supplemental Oxygen (L/min / FiO2): 8  Independent Airway: airway patency satisfactory and stable  Dentition: dentition unchanged  Vital Signs Stable: post-procedure vital signs reviewed and stable  Report to RN Given: handoff report given  Patient transferred to: PACU  Comments: Pt to recovery.  Spontaneous respirations and exchanging well.  Report given to RN.  Crepitus assessed by RN at bedside.    Handoff Report: Identifed the Patient, Identified the Reponsible Provider, Reviewed the pertinent medical history, Discussed the surgical course, Reviewed Intra-OP anesthesia mangement and issues during anesthesia, Set expectations for post-procedure period and Allowed opportunity for questions and acknowledgement of understanding      Vitals:  Vitals Value Taken Time   /88 06/24/22 1004   Temp 96.9  F (36.1  C) 06/24/22 1004   Pulse 126 06/24/22 1006   Resp 13 06/24/22 1006   SpO2 98 % 06/24/22 1006   Vitals shown include unvalidated device data.    Electronically Signed By: ANNABEL Perez CRNA  June 24, 2022  10:07 AM

## 2022-06-24 NOTE — ANESTHESIA POSTPROCEDURE EVALUATION
Patient: Terry Verduzco    Procedure: Procedure(s):  Xi Robotic Assisted HERNIORRHAPHY, INGUINAL, LAPAROSCOPIC, WITH MESH - Right       Anesthesia Type:  General    Note:     Postop Pain Control: Uneventful            Sign Out: Well controlled pain   PONV: No   Neuro/Psych: Uneventful            Sign Out: Acceptable/Baseline neuro status   Airway/Respiratory: Uneventful            Sign Out: Acceptable/Baseline resp. status   CV/Hemodynamics: Uneventful            Sign Out: Acceptable CV status   Other NRE: NONE   DID A NON-ROUTINE EVENT OCCUR? No           Last vitals:  Vitals Value Taken Time   /87 06/24/22 1025   Temp 96.9  F (36.1  C) 06/24/22 1004   Pulse 95 06/24/22 1027   Resp 0 06/24/22 1027   SpO2 97 % 06/24/22 1020   Vitals shown include unvalidated device data.    Electronically Signed By: Gregg Coy MD  June 24, 2022  10:28 AM

## 2022-06-24 NOTE — ANESTHESIA PREPROCEDURE EVALUATION
Anesthesia Pre-Procedure Evaluation    Patient: Terry Verduzco   MRN: [id number] : 1945        Procedure : Procedure(s):  Xi Robotic Assisted HERNIORRHAPHY, INGUINAL, LAPAROSCOPIC, WITH MESH - Right          Past Medical History:   Diagnosis Date     Hyperlipidemia LDL goal <100      Hypertension goal BP (blood pressure) < 140/90      Prediabetes 2021     Primary osteoarthritis involving multiple joints      Psoriasis       Past Surgical History:   Procedure Laterality Date     SURGICAL HISTORY OF -  Right     right inguinal hernia     SURGICAL HISTORY OF -       cholecystectomy     SURGICAL HISTORY OF -  Left     left testicle removed secondary to injury     SURGICAL HISTORY OF -  Left 2021    Left eye - Dr Lees - Macular Hole and Epiretinal Membrane     SURGICAL HISTORY OF -       Right Inguinal hernia     SURGICAL HISTORY OF -  Left 2021    Left IOL - Dr Becerra      Allergies   Allergen Reactions     Codeine      Tachycardia     Demerol [Meperidine] Rash      Social History     Tobacco Use     Smoking status: Never Smoker     Smokeless tobacco: Never Used   Substance Use Topics     Alcohol use: Not Currently      Wt Readings from Last 1 Encounters:   22 88.3 kg (194 lb 9.6 oz)        Anesthesia Evaluation   Pt has had prior anesthetic. Type: General.    No history of anesthetic complications       ROS/MED HX  ENT/Pulmonary:  - neg pulmonary ROS     Neurologic:  - neg neurologic ROS     Cardiovascular:     (+) Dyslipidemia hypertension-----    METS/Exercise Tolerance:     Hematologic:  - neg hematologic  ROS     Musculoskeletal:  - neg musculoskeletal ROS     GI/Hepatic: Comment: hernia      Renal/Genitourinary:  - neg Renal ROS     Endo: Comment: Pre-diabetes      Psychiatric/Substance Use:  - neg psychiatric ROS     Infectious Disease:  - neg infectious disease ROS     Malignancy:  - neg malignancy ROS     Other:  - neg other ROS          Physical Exam    Airway         Mallampati: I   TM distance: > 3 FB   Neck ROM: full   Mouth opening: > 3 cm    Respiratory Devices and Support         Dental  no notable dental history         Cardiovascular   cardiovascular exam normal          Pulmonary   pulmonary exam normal                OUTSIDE LABS:  CBC:   Lab Results   Component Value Date    WBC 6.2 05/25/2022    WBC 8.3 03/03/2021    HGB 14.7 05/25/2022    HGB 16.4 03/03/2021    HCT 43.9 05/25/2022    HCT 49.3 03/03/2021     05/25/2022     03/03/2021     BMP:   Lab Results   Component Value Date     05/25/2022     07/05/2021    POTASSIUM 4.3 05/25/2022    POTASSIUM 4.3 07/05/2021    CHLORIDE 107 05/25/2022    CHLORIDE 104 07/05/2021    CO2 25 05/25/2022    CO2 26 07/05/2021    BUN 29 05/25/2022    BUN 32 (H) 07/05/2021    CR 1.45 (H) 05/25/2022    CR 1.56 (H) 07/05/2021     (H) 05/25/2022     (H) 07/05/2021     COAGS: No results found for: PTT, INR, FIBR  POC: No results found for: BGM, HCG, HCGS  HEPATIC:   Lab Results   Component Value Date    ALBUMIN 3.8 05/25/2022    PROTTOTAL 7.6 05/25/2022    ALT 29 05/25/2022    AST 31 05/25/2022    ALKPHOS 59 05/25/2022    BILITOTAL 1.0 05/25/2022     OTHER:   Lab Results   Component Value Date    A1C 5.8 (H) 05/25/2022    TAYLOR 9.5 05/25/2022    TSH 1.28 05/25/2022       Anesthesia Plan    ASA Status:  2   NPO Status:  NPO Appropriate    Anesthesia Type: General.     - Airway: ETT   Induction: Intravenous, Propofol.   Maintenance: Balanced.        Consents    Anesthesia Plan(s) and associated risks, benefits, and realistic alternatives discussed. Questions answered and patient/representative(s) expressed understanding.    - Discussed:     - Discussed with:  Patient         Postoperative Care    Pain management: IV analgesics, Oral pain medications, Multi-modal analgesia.   PONV prophylaxis: Ondansetron (or other 5HT-3), Dexamethasone or Solumedrol     Comments:                Gregg Coy,  MD

## 2022-06-24 NOTE — DISCHARGE INSTRUCTIONS
HOME CARE FOLLOWING INGUINAL/FEMORAL HERNIA REPAIR  HORTENCIA Jacobs, CHRIS Chance, DOREEN Aguirre, BRANDON Herrera    DIET:  Start with liquids and gradually resume your regular diet as tolerated.  Drink plenty of fluids.  While taking pain medications, consider use of a stool softener, increase your fiber in your diet, or add a fiber supplement (like Metamucil, Citrucel) to help prevent constipation - a possible side effect of pain medications.    NAUSEA:  If nauseated from the anesthetic/pain meds; rest in bed, get up cautiously with assistance, and drink clear liquids (juice, tea, broth).    ACTIVITY:  Light Activity -- you may immediately be up and about as tolerated.  Walking is encouraged, increase as tolerated.  Driving/Light Work-- when comfortable and off narcotic pain medications.  Strenuous Work/Activity -- limit lifting to 20 pounds for 3 weeks.  Active Sports (running, biking, etc.) -- cautiously resume after 4 weeks.    INCISIONAL CARE:  If you have a dressing in place, keep clean and dry for 48 hours; you may replace the gauze if it becomes soiled.  After 48 hours you may remove the dressing and shower.  Do not submerse incision in water for 1 week.  If you have a Dermabond dressing (a type of skin glue), you may shower immediately.  Sutures will absorb and need not be removed.  If present, leave the steri-strips (white paper tapes) in place for 14 days after surgery.  If present, leave Dermabond glue in place until it wears/flakes off.  Do not apply lotions, creams, or ointments to incisions.  Expect a variable amount of swelling/black and blue discoloration that may involve the penis/scrotum or labia.  Some numbness around the incision is common.  A lump/ridge under the incision is normal and will gradually resolve.    DISCOMFORT:  Local anesthetic placed at surgery should provide relief for 4-8 hours.  Begin taking pain pills before discomfort is severe.  Take the pain medication  with some food, when possible, to minimize side effects.  Intermittent use of ice packs to the hernia repair site may help during the first 1-3 weeks after surgery.  Expect gradual improvement.    Over-the-counter anti-inflammatory medications (i.e. Ibuprofen/Advil/Motrin or Naprosyn/Aleve) may be used per package instructions in addition to or while tapering off the narcotic pain medications to decrease swelling and sensitivity at the repair site.  DO NOT TAKE these Anti-inflammatory medications if your primary physician has advised against doing so, or if you have acid reflux, ulcer, or bleeding disorder, or take blood-thinner medications.  Call your primary physician or the surgery office if you have medication questions.    FOLLOW-UP AFTER SURGERY:  -Our office will contact you approximately 2-3 weeks after surgery to check on your progress and answer any questions you may have.  If you are doing well, you will not need to return for an office appointment.  If any concerns are identified over the phone, we will help you make an appointment to see a provider.    -If you have not received a phone call, have any questions or concerns, or would like to be seen, please call us at [phone].  We are located at: [address], [address]; [place] [address]    -CONTACT US IF THE FOLLOWING DEVELOPS:   1. A fever that is above 101     2. Increased redness, warmth, drainage, bleeding, or swelling.   3. Pain that is not relieved by rest/ice and your prescription.   4.  Increasing pain after 48 hours.   5. Drainage that is thick, cloudy, yellow, green or white.   6. Any other questions or concerns.      FREQUENTLY ASKED QUESTIONS:    Q:  How should my incision look?    A:  Normally your incision will appear slightly swollen with light redness directly along the incision itself as it heals.  It may feel like a bump or ridge as the healing/scarring happens, and over time (3-4 months) this bump or ridge feeling  should slowly go away.  In general, clear or pink watery drainage can be normal at first as your incision heals, but should decrease over time.    Q:  How do I know if my incision is infected?  A:  Look at your incision for signs of infection, like redness around the incision spreading to surrounding skin, or drainage of cloudy or foul-smelling drainage.  If you feel warm, check your temperature to see if you are running a fever.    **If any of these things occur, please notify the nurse at our office.  We may need you to come into the office for an incision check.      Q:  How do I take care of my incision?  A:  If you have a dressing in place - Starting the day after surgery, replace the dressing 1-2 times a day until there is no further drainage from the incision.  At that time, a dressing is no longer needed.  Try to minimize tape on the skin if irritation is occurring at the tape sites.  If you have significant irritation from tape on the skin, please call the office to discuss other method of dressing your incision.    Small pieces of tape called  steri-strips  may be present directly overlying your incision; these may be removed 10 days after surgery unless otherwise specified by your surgeon.  If these tapes start to loosen at the ends, you may trim them back until they fall off or are removed.    A:  If you had  Dermabond  tissue glue used as a dressing (this causes your incision to look shiny with a clear covering over it) - This type of dressing wears off with time and does not require more dressings over the top unless it is draining around the glue as it wears off.  Do not apply ointments or lotions over the incisions until the glue has completely worn off.    Q:  There is a piece of tape or a sticky  lead  still on my skin.  Can I remove this?  A:  Sometimes the sticky  leads  used for monitoring during surgery or for evaluation in the emergency department are not all removed while you are in the  hospital.  These sometimes have a tab or metal dot on them.  You can easily remove these on your own, like taking off a band-aid.  If there is a gel substance under the  lead , simply wipe/clean it off with a washcloth or paper towel.      Q:  What can I do to minimize constipation (very hard stools, or lack of stools)?  A:  Stay well hydrated.  Increase your dietary fiber intake or take a fiber supplement -with plenty of water.  Walk around frequently.  You may consider an over-the-counter stool-softener.  Your Pharmacist can assist you with choosing one that is stocked at your pharmacy.  Constipation is also one of the most common side effects of pain medication.  If you are using pain medication, be pro-active and try to PREVENT problems with constipation by taking the steps above BEFORE constipation becomes a problem.    Q:  What do I do if I need more pain medications?  A:  Call the office to receive refills.  Be aware that certain pain meds cannot be called into a pharmacy and actually require a paper prescription.  A change may be made in your pain med as you progress thru your recovery period or if you have side effects to certain meds.    --Pain meds are NOT refilled after 5pm on weekdays, and NOT AT ALL on the weekends, so please look ahead to prevent problems.    Q:  Why am I having a hard time sleeping now that I am at home?  A:  Many medications you receive while you are in the hospital can impact your sleep for a number of days after your surgery/hospitalization.  Decreased level of activity and naps during the day may also make sleeping at night difficult.  Try to minimize day-time naps, and get up frequently during the day to walk around your home during your recovery time.  Sleep aides may be of some help, but are not recommended for long-term use.      Q:  I am having some back discomfort.  What should I do?  A:  This may be related to certain positioning that was required for your surgery,  extended periods of time in bed, or other changes in your overall activity level.  You may try ice, heat, acetaminophen, or ibuprofen to treat this temporarily.  Note that many pain medications have acetaminophen in them and would state this on the prescription bottle.  Be sure not to exceed the maximum of 4000mg per day of acetaminophen.     **If the pain you are having does not resolve, is severe, or is a flare of back pain you have had on other occasions prior to surgery, please contact your primary physician for further recommendations or for an appointment to be examined at their office.    Q:  Why am I having headaches?  A:  Headaches can be caused by many things:  caffeine withdrawal, use of pain meds, dehydration, high blood pressure, lack of sleep, over-activity/exhaustion, flare-up of usual migraine headaches.  If you feel this is related to muscle tension (a band-like feeling around the head, or a pressure at the low-back of the head) you may try ice or heat to this area.  You may need to drink more fluids (try electrolyte drink like Gatorade), rest, or take your usual migraine medications.   **If your headaches do not resolve, worsen, are accompanied by other symptoms, or if your blood pressure is high, please call your primary physician for recommendation and/or examination.    Q:  I am unable to urinate.  What do I do?  A:  A small percentage of people can have difficulty urinating initially after surgery.  This includes being able to urinate only a very small amount at a time and feeling discomfort or pressure in the very low abdomen.  This is called  urinary retention , and is actually an urgent situation.  Proceed to your nearest Emergency department for evaluation (not an Urgent Care Center).  Sometimes the bladder does not work correctly after certain medications you receive during surgery, or related to certain procedures.  You may need to have a catheter placed until your bladder recovers.  When  planning to go to an Emergency department, it may help to call the ER to let them know you are coming in for this problem after a surgery.  This may help you get in quicker to be evaluated.  **If you have symptoms of a urinary tract infection, please contact your primary physician for the proper evaluation and treatment.        If you have other questions, please call the office Monday thru Friday between 8am and 4:30pm to discuss with the nurse or physician assistant.  [phone]    There is a surgeon ON CALL on weekday evenings and over the weekend in case of urgent need only, and may be contacted at the same number.    If you are having an emergency, call 911 or proceed to your nearest emergency department.    GENERAL ANESTHESIA OR SEDATION ADULT DISCHARGE INSTRUCTIONS   SPECIAL PRECAUTIONS FOR 24 HOURS AFTER SURGERY    IT IS NOT UNUSUAL TO FEEL LIGHT-HEADED OR FAINT, UP TO 24 HOURS AFTER SURGERY OR WHILE TAKING PAIN MEDICATION.  IF YOU HAVE THESE SYMPTOMS; SIT FOR A FEW MINUTES BEFORE STANDING AND HAVE SOMEONE ASSIST YOU WHEN YOU GET UP TO WALK OR USE THE BATHROOM.    YOU SHOULD REST AND RELAX FOR THE NEXT 24 HOURS AND YOU MUST MAKE ARRANGEMENTS TO HAVE SOMEONE STAY WITH YOU FOR AT LEAST 24 HOURS AFTER YOUR DISCHARGE.  AVOID HAZARDOUS AND STRENUOUS ACTIVITIES.  DO NOT MAKE IMPORTANT DECISIONS FOR 24 HOURS.    DO NOT DRIVE ANY VEHICLE OR OPERATE MECHANICAL EQUIPMENT FOR 24 HOURS FOLLOWING THE END OF YOUR SURGERY.  EVEN THOUGH YOU MAY FEEL NORMAL, YOUR REACTIONS MAY BE AFFECTED BY THE MEDICATION YOU HAVE RECEIVED.    DO NOT DRINK ALCOHOLIC BEVERAGES FOR 24 HOURS FOLLOWING YOUR SURGERY.    DRINK CLEAR LIQUIDS (APPLE JUICE, GINGER ALE, 7-UP, BROTH, ETC.).  PROGRESS TO YOUR REGULAR DIET AS YOU FEEL ABLE.    YOU MAY HAVE A DRY MOUTH, A SORE THROAT, MUSCLES ACHES OR TROUBLE SLEEPING.  THESE SHOULD GO AWAY AFTER 24 HOURS.    CALL YOUR DOCTOR FOR ANY OF THE FOLLOWING:  SIGNS OF INFECTION (FEVER, GROWING TENDERNESS  AT THE SURGERY SITE, A LARGE AMOUNT OF DRAINAGE OR BLEEDING, SEVERE PAIN, FOUL-SMELLING DRAINAGE, REDNESS OR SWELLING.    IT HAS BEEN OVER 8 TO 10 HOURS SINCE SURGERY AND YOU ARE STILL NOT ABLE TO URINATE (PASS WATER).

## 2022-06-24 NOTE — BRIEF OP NOTE
"[facility]    Brief Operative Note    Pre-operative diagnosis: Recurrent right inguinal hernia [K40.91]  Post-operative diagnosis Recurrent right inguinal hernia    Procedure: Procedure(s):  Xi Robotic Assisted HERNIORRHAPHY, INGUINAL, LAPAROSCOPIC, WITH MESH - Right  Surgeon: Surgeon(s) and Role:     * Charles Elliott MD - Primary     * Jerry Torres PA-C - Assisting  Anesthesia: General   Estimated Blood Loss: Less than 10 ml    Drains: None  Specimens: * No specimens in log *  Findings:   Indirect defect consisting of fat and sac. Mildly dilated appendix with possible diverticulum, no inflammation..  Complications: None.  Implants:   Implant Name Type Inv. Item Serial No.  Lot No. LRB No. Used Action   MESH PROGRIP LAPAROSCOPIC 5.9X3.9\" PARIETEX SELF-FIX [id number] - [id number] Mesh MESH PROGRIP LAPAROSCOPIC 5.9X3.9\" PARIETEX SELF-FIX [id number]  [facility] [id number] 1 Implanted           "

## 2022-06-24 NOTE — OP NOTE
Procedure Date: 06/24/2022    PREOPERATIVE DIAGNOSIS:  Symptomatic recurrent right inguinal hernia.    POSTOPERATIVE DIAGNOSIS:  Symptomatic recurrent right inguinal hernia.    PROCEDURE:  Robotic-assisted laparoscopic repair of recurrent right inguinal hernia with placement of underlay mesh.    ANESTHESIA:  General plus local.    SURGEON:  Charles Saravia MD    ASSISTANT:  Jerry Torres PA-C.  The physician assistant was medically necessary for his skills in suturing, cutting suture, exposure, retraction, camera management and docking of the robot and exchange of instruments throughout the operation.    SPECIMENS:  None submitted.    COMPLICATIONS:  None.    INDICATIONS FOR PROCEDURE:  Ms. Verduzco is a 77-year-old gentleman with a history of a right inguinal hernia repair with mesh in the 1980s, who presented with a bulge and pain in the right groin, after moving furniture.  The patient had a clinical hernia on exam on the right only because this was a symptomatic recurrence, I offered robotic-assisted laparoscopy.  Risks of the operation were discussed with him in detail.  These include infection, bleeding, harm to adjacent structures, need to revert an anterior approach, mesh infection, hernia recurrence, chronic pain, testicular loss.  The patient verbalized understanding of the above and consented to proceed.    FINDINGS:  The patient had indirect defect consisting of preperitoneal fat as well as an organized sac emanating through the internal ring.  There was no direct component.  The defect was repaired with a ProGrip underlay mesh.  The appendix was mildly dilated without inflammation.  There was a possible diverticulum on the mesenteric side.  These findings were discussed with the patient and his wife postoperatively.    DESCRIPTION OF PROCEDURE:  With the patient under excellent general anesthesia in supine position, Ma catheter was placed to decompress the bladder, the abdomen was subsequently  clippered and prepped and draped out in the usual sterile surgical fashion.  A timeout was then performed confirming the patient, procedure to be done as well as drug allergies and site markings.  He did receive a dose of Ancef for infection prophylaxis prior to making our initial incision.  We began by marking the midline, 2 hand breaths up from the pubic symphysis and made a short longitudinal incision at the inferior umbilical skin fold.  Electrocautery and blunt dissection were then carried out down to the level of the midline fascia, which was then grasped and elevated into view.  The fascia was nicked slightly with a 15 blade.  An figure-of-eight stay stitch was placed of 0 Vicryl and the peritoneum was punctured bluntly with a Carmalt.  We introduced the camera port through this defect, applied insufflation, placed the patient in 15 degrees head down.  Two further 8 mm robotic cannula was then placed about a handbreadth off midline under direct vision.  The [employer] surgical robot was then brought into the field and docked without difficulty.  A grasper was placed into the left operative arm and a monopolar scissors on the right.  We surveyed the low abdomen and noted an indirect defect on the right side as anticipated.  The tip of the appendix was actually through the hernial orifice, but this was readily bluntly swept away and reduced.  As noted above, the appendix was mildly dilated and firm, but without signs of acute inflammation.  There did appear to be a diverticulum on the mesenteric side of the appendix.  We palpating the ASIS and scored the peritoneum several centimeters anterior to this from lateral to medial to the median umbilical ligament.  The preperitoneal space was then developed with a combination of blunt dissection and electrocautery, both lead lateral and medial to the internal ring.  The pubis was cleared of fat posteriorly.  There was no direct defect appreciated.  We then grasped the  peritoneal reflection and hernia contents and reduced them from the internal ring, sweeping them down off the cord for several centimeters.  A ProGrip mesh was then introduced into the field.  It was folded in quarters and the edges were then rounded with the scissors.  The mesh was placed through the cannula and unfurled in the preperitoneal space such that the center reva was over the internal ring.  We then used a 3-0 V-Loc suture to close the peritoneum without difficulty.  The needle was retrieved thereafter.  Final survey of the abdomen was made.  The remaining instruments were removed.  The robot was then undocked to the cannulas were removed after desufflation of the abdomen.  The stay stitch at the umbilicus was then tied down and the remaining 30 mL of 0.5% plain Marcaine was infiltrated in the other sites. 4-0 Vicryls were used to reapproximate the dermis.  Skin glue was applied to the closed wound thereafter.  The patient tolerated the procedure well, was extubated and brought to recovery in excellent condition.  All sharps and sponge counts were correct at the conclusion of the case.    Charles Martin MD        D: 2022   T: 2022   MT: [id number]    Name:     CLAUDIA DEXTER  MRN:      [id number]        Account:        [id number]   :      1945           Procedure Date: 2022     Document: [id number]    cc:  Jalen Daly MD

## 2022-06-24 NOTE — ANESTHESIA PROCEDURE NOTES
Airway       Patient location during procedure: OR       Procedure Start/Stop Times: 6/24/2022 8:19 AM  Staff -        CRNA: Esther Andrade APRN CRNA       Performed By: CRNA  Consent for Airway        Urgency: elective  Indications and Patient Condition       Indications for airway management: kay-procedural       Induction type:intravenous       Mask difficulty assessment: 1 - vent by mask    Final Airway Details       Final airway type: endotracheal airway       Successful airway: ETT - single  Endotracheal Airway Details        ETT size (mm): 8.0       Cuffed: yes       Successful intubation technique: direct laryngoscopy       DL Blade Type: Navas 2       Grade View of Cords: 1       Adjucts: stylet       Position: Right       Measured from: gums/teeth       Secured at (cm): 23       Bite block used: None    Post intubation assessment        Placement verified by: capnometry, equal breath sounds and chest rise        Number of attempts at approach: 1       Number of other approaches attempted: 0       Secured with: plastic tape       Ease of procedure: easy       Dentition: Intact and Unchanged    Medication(s) Administered   Medication Administration Time: 6/24/2022 8:19 AM

## 2022-06-25 VITALS
DIASTOLIC BLOOD PRESSURE: 73 MMHG | OXYGEN SATURATION: 97 % | BODY MASS INDEX: 26.4 KG/M2 | TEMPERATURE: 98.2 F | HEART RATE: 85 BPM | WEIGHT: 194.9 LBS | SYSTOLIC BLOOD PRESSURE: 132 MMHG | HEIGHT: 72 IN | RESPIRATION RATE: 16 BRPM

## 2022-06-25 PROBLEM — R33.9 URINARY RETENTION: Status: ACTIVE | Noted: 2022-06-25

## 2022-06-25 PROBLEM — E87.1 HYPONATREMIA: Status: ACTIVE | Noted: 2022-06-25

## 2022-06-25 LAB
ANION GAP SERPL CALCULATED.3IONS-SCNC: 3 MMOL/L (ref 3–14)
BUN SERPL-MCNC: 30 MG/DL (ref 7–30)
CALCIUM SERPL-MCNC: 9 MG/DL (ref 8.5–10.1)
CHLORIDE BLD-SCNC: 108 MMOL/L (ref 94–109)
CO2 SERPL-SCNC: 28 MMOL/L (ref 20–32)
CREAT SERPL-MCNC: 1.48 MG/DL (ref 0.66–1.25)
GFR SERPL CREATININE-BSD FRML MDRD: 49 ML/MIN/1.73M2
GLUCOSE BLD-MCNC: 113 MG/DL (ref 70–99)
MAGNESIUM SERPL-MCNC: 2.5 MG/DL (ref 1.6–2.3)
OSMOLALITY SERPL: 268 MMOL/KG (ref 280–301)
OSMOLALITY UR: 306 MMOL/KG (ref 100–1200)
POTASSIUM BLD-SCNC: 4.5 MMOL/L (ref 3.4–5.3)
SARS-COV-2 RNA RESP QL NAA+PROBE: NEGATIVE
SODIUM SERPL-SCNC: 139 MMOL/L (ref 133–144)
SODIUM SERPL-SCNC: 139 MMOL/L (ref 133–144)
SODIUM UR-SCNC: 20 MMOL/L

## 2022-06-25 PROCEDURE — 250N000013 HC RX MED GY IP 250 OP 250 PS 637: Performed by: INTERNAL MEDICINE

## 2022-06-25 PROCEDURE — 96361 HYDRATE IV INFUSION ADD-ON: CPT

## 2022-06-25 PROCEDURE — 36415 COLL VENOUS BLD VENIPUNCTURE: CPT | Performed by: INTERNAL MEDICINE

## 2022-06-25 PROCEDURE — 99220 PR INITIAL OBSERVATION CARE,LEVEL III: CPT | Performed by: INTERNAL MEDICINE

## 2022-06-25 PROCEDURE — 83735 ASSAY OF MAGNESIUM: CPT | Performed by: INTERNAL MEDICINE

## 2022-06-25 PROCEDURE — 80048 BASIC METABOLIC PNL TOTAL CA: CPT | Performed by: INTERNAL MEDICINE

## 2022-06-25 PROCEDURE — U0005 INFEC AGEN DETEC AMPLI PROBE: HCPCS | Performed by: EMERGENCY MEDICINE

## 2022-06-25 PROCEDURE — 84295 ASSAY OF SERUM SODIUM: CPT | Performed by: PHYSICIAN ASSISTANT

## 2022-06-25 PROCEDURE — G0378 HOSPITAL OBSERVATION PER HR: HCPCS

## 2022-06-25 PROCEDURE — 36415 COLL VENOUS BLD VENIPUNCTURE: CPT | Performed by: PHYSICIAN ASSISTANT

## 2022-06-25 PROCEDURE — 258N000003 HC RX IP 258 OP 636: Performed by: INTERNAL MEDICINE

## 2022-06-25 RX ORDER — POLYETHYLENE GLYCOL 3350 17 G/17G
17 POWDER, FOR SOLUTION ORAL DAILY PRN
Status: DISCONTINUED | OUTPATIENT
Start: 2022-06-25 | End: 2022-06-25 | Stop reason: HOSPADM

## 2022-06-25 RX ORDER — ACETAMINOPHEN 325 MG/1
650 TABLET ORAL EVERY 6 HOURS PRN
Status: DISCONTINUED | OUTPATIENT
Start: 2022-06-25 | End: 2022-06-25 | Stop reason: HOSPADM

## 2022-06-25 RX ORDER — ONDANSETRON 2 MG/ML
4 INJECTION INTRAMUSCULAR; INTRAVENOUS EVERY 6 HOURS PRN
Status: DISCONTINUED | OUTPATIENT
Start: 2022-06-25 | End: 2022-06-25 | Stop reason: HOSPADM

## 2022-06-25 RX ORDER — AMOXICILLIN 250 MG
1 CAPSULE ORAL 2 TIMES DAILY PRN
Status: DISCONTINUED | OUTPATIENT
Start: 2022-06-25 | End: 2022-06-25 | Stop reason: HOSPADM

## 2022-06-25 RX ORDER — LISINOPRIL AND HYDROCHLOROTHIAZIDE 12.5; 2 MG/1; MG/1
1 TABLET ORAL DAILY
Qty: 90 TABLET | Refills: 3
Start: 2022-06-29 | End: 2022-11-07

## 2022-06-25 RX ORDER — TAMSULOSIN HYDROCHLORIDE 0.4 MG/1
0.4 CAPSULE ORAL DAILY
Qty: 14 CAPSULE | Refills: 0 | Status: SHIPPED | OUTPATIENT
Start: 2022-06-26 | End: 2022-06-29

## 2022-06-25 RX ORDER — TAMSULOSIN HYDROCHLORIDE 0.4 MG/1
0.4 CAPSULE ORAL DAILY
Status: DISCONTINUED | OUTPATIENT
Start: 2022-06-25 | End: 2022-06-25 | Stop reason: HOSPADM

## 2022-06-25 RX ORDER — METOPROLOL SUCCINATE 50 MG/1
50 TABLET, EXTENDED RELEASE ORAL DAILY
Status: DISCONTINUED | OUTPATIENT
Start: 2022-06-25 | End: 2022-06-25 | Stop reason: HOSPADM

## 2022-06-25 RX ORDER — HYDROCODONE BITARTRATE AND ACETAMINOPHEN 5; 325 MG/1; MG/1
1-2 TABLET ORAL EVERY 4 HOURS PRN
Status: DISCONTINUED | OUTPATIENT
Start: 2022-06-25 | End: 2022-06-25 | Stop reason: HOSPADM

## 2022-06-25 RX ORDER — AMOXICILLIN 250 MG
2 CAPSULE ORAL 2 TIMES DAILY PRN
Status: DISCONTINUED | OUTPATIENT
Start: 2022-06-25 | End: 2022-06-25 | Stop reason: HOSPADM

## 2022-06-25 RX ORDER — SODIUM CHLORIDE 9 MG/ML
INJECTION, SOLUTION INTRAVENOUS CONTINUOUS
Status: DISCONTINUED | OUTPATIENT
Start: 2022-06-25 | End: 2022-06-25

## 2022-06-25 RX ORDER — ACETAMINOPHEN 650 MG/1
650 SUPPOSITORY RECTAL EVERY 6 HOURS PRN
Status: DISCONTINUED | OUTPATIENT
Start: 2022-06-25 | End: 2022-06-25 | Stop reason: HOSPADM

## 2022-06-25 RX ORDER — ONDANSETRON 4 MG/1
4 TABLET, ORALLY DISINTEGRATING ORAL EVERY 6 HOURS PRN
Status: DISCONTINUED | OUTPATIENT
Start: 2022-06-25 | End: 2022-06-25 | Stop reason: HOSPADM

## 2022-06-25 RX ADMIN — SODIUM CHLORIDE: 9 INJECTION, SOLUTION INTRAVENOUS at 03:04

## 2022-06-25 RX ADMIN — METOPROLOL SUCCINATE 50 MG: 50 TABLET, EXTENDED RELEASE ORAL at 08:17

## 2022-06-25 RX ADMIN — TAMSULOSIN HYDROCHLORIDE 0.4 MG: 0.4 CAPSULE ORAL at 08:16

## 2022-06-25 NOTE — PLAN OF CARE
ROOM # 207    Living Situation (if not independent, order SW consult): Ind with wife  Facility name:  : [facility] (wife) [phone]    Activity level at baseline: Ind  Activity level on admit: SBA    Who will be transporting you at discharge: [facility]za or Eusebio    Patient registered to observation; given Patient Bill of Rights; given the opportunity to ask questions about observation status and their plan of care.  Patient has been oriented to the observation room, bathroom and call light is in place.    Discussed discharge goals and expectations with patient/family.

## 2022-06-25 NOTE — PLAN OF CARE
PRIMARY DIAGNOSIS: Post-Op Urinary Retention  OUTPATIENT/OBSERVATION GOALS TO BE MET BEFORE DISCHARGE:  1. Stable vital signs Yes  2. Tolerating diet: Yes  3. Pain controlled with oral pain medications:  Yes  4. Positive bowel sounds:  Yes  5. Voiding without difficulty:  No - plaza in place  6. Able to ambulate:  Yes  7. Provider specific discharge goals met:  Marion Huerta recheck    Discharge Planner Nurse   Safe discharge environment identified: Yes  Barriers to discharge:  Na recheck. Plaza teaching. Wife coming around 13:00.       Entered by: Betzy Rojas RN 06/25/2022       VSS. Orthostatic BP negative. Patient denies pain, slight abdominal tenderness with palpation. Lap sites dermabonded, WNL. Tolerated regular diet. BS active x 4, + flatus. Plaza in place with pale, yellow output. Plan to discharge with plaza this afternoon - wife coming at 13:00 for plaza teaching.     Please review provider order for any additional goals.   Nurse to notify provider when observation goals have been met and patient is ready for discharge.

## 2022-06-25 NOTE — DISCHARGE INSTRUCTIONS
CareNotesWhat is a Ma catheter?  A Ma catheter is a sterile tube that is inserted into your bladder to drain urine. It is also called an indwelling urinary catheter. The tip of the catheter has a small balloon filled with solution that holds the catheter in your bladder.    How is a Ma catheter placed?  Your genital area will be cleaned to prevent infection. The catheter will be inserted into your urethra. When urine begins to flow into the tubing, the balloon is filled to keep the catheter in place. Then, the open end will be attached to a drainage bag.    How do I care for my catheter and drainage bag?  You can reduce your risk for infection and injury by caring for your catheter and drainage bag properly.    Wash your hands often. Wash before and after you touch your catheter, tubing, or drainage bag. Use soap and water. Wear clean disposable gloves when you care for your catheter or disconnect the drainage bag. Wash your hands before you prepare or eat food.  Handwashing  Clean your genital area 2 times every day. Clean your catheter area and anal opening after every bowel movement.  For men: Use a soapy cloth to clean the tip of your penis. Start where the catheter enters. Wipe backward making sure to pull back the foreskin. Then use a cloth with clear water in the same direction to clean away the soap.  For women: Use a soapy cloth to clean the area that the catheter enters your body. Make sure to separate your labia and wipe toward the anus. Then use a cloth with clear water and wipe in the same direction.  Secure the catheter tube so you do not pull or move the catheter. This helps prevent pain and bladder spasms. Healthcare providers will show you how to use medical tape or a strap to secure the catheter tube to your body.    Keep a closed drainage system. Your catheter should always be attached to the drainage bag to form a closed system. Do not disconnect any part of the closed system unless you  need to change the bag.    Keep the drainage bag below the level of your waist. This helps stop urine from moving back up the tubing and into your bladder. Do not loop or kink the tubing. This can cause urine to back up and collect in your bladder. Do not let the drainage bag touch or lie on the floor.  Empty the drainage bag when needed. T  he weight of a full drainage bag can be painful. Empty the drainage bag every 3 to 6 hours or when it is ? full.    Clean and change the drainage bag as directed. Ask your healthcare provider how often you should change the drainage bag and what cleaning solution to use. Wear disposable gloves when you change the bag. Do not allow the end of the catheter or tubing to touch anything. Clean the ends with an alcohol pad before you reconnect them.      What can I do if problems develop?    No urine is draining into the bag:  Check for kinks in the tubing and straighten them out.  Check the tape or strap used to secure the catheter tube to your skin. Make sure it is not blocking the tube.  Make sure you are not sitting or lying on the tubing.  Make sure the urine bag is hanging below the level of your waist.  Urine leaks from or around the catheter, tubing, or drainage bag: Check if the closed drainage system has accidently come open or apart. Clean the catheter and tubing ends with a new alcohol pad and reconnect them.    When should I seek immediate care?  Your catheter comes out.  You suddenly have material that looks like sand in the tubing or drainage bag.  No urine is draining into the bag and you have checked the system.  You have pain in your hip, back, pelvis, or lower abdomen.  You are confused or cannot think clearly.    When should I call my doctor?  You have a fever.  You have bladder spasms for more than 1 day after the catheter is placed.  You see blood in the tubing or drainage bag.  You have a rash or itching where the catheter tube is secured to your skin.  Urine  leaks from or around the catheter, tubing, or drainage bag.  The closed drainage system has accidently come open or apart.  You see a layer of crystals inside the tubing.  You have questions or concerns about your condition or care.

## 2022-06-25 NOTE — PHARMACY-ADMISSION MEDICATION HISTORY
Admission medication history interview status for this patient is complete. See [facility] admission navigator for allergy information, prior to admission medications and immunization status.     Medication history interview done, indicate source(s): Patient  Medication history resources (including written lists, pill bottles, clinic record): [employer] dispense records  Pharmacy: [facility]/pharmacy #[id number] [id number] [address] [phone]    Changes made to PTA medication list:  Added: Melatonin    Actions taken by pharmacist (provider contacted, etc):None     Additional medication history information:None    Medication reconciliation/reorder completed by provider prior to medication history?  No    Prior to Admission medications    Medication Sig Last Dose Taking? Auth Provider Long Term End Date   atorvastatin (LIPITOR) 80 MG tablet Take 1 tablet (80 mg) by mouth daily 6/24/2022 at Unknown time Yes Jalen Daly MD Yes    cholecalciferol (VITAMIN D3) 25 mcg (1000 units) capsule Take 1 capsule by mouth daily 6/24/2022 at Unknown time Yes Reported, Patient     fenofibrate (TRIGLIDE/LOFIBRA) 160 MG tablet Take 1 tablet (160 mg) by mouth daily 6/24/2022 at Unknown time Yes Jalen Daly MD Yes    glucosamine-chondroitin 500-400 MG CAPS per capsule Take 1 capsule by mouth daily 6/24/2022 at Unknown time Yes Reported, Patient     HYDROcodone-acetaminophen (NORCO) 5-325 MG tablet Take 1-2 tablets by mouth every 4 hours as needed for moderate to severe pain Past Month at Unknown time Yes Charles Elliott MD     lisinopril-hydrochlorothiazide (ZESTORETIC) 20-12.5 MG tablet Take 1 tablet by mouth daily 6/24/2022 at Unknown time Yes Jalen Daly MD Yes    melatonin 5 MG tablet Take 5 mg by mouth nightly as needed for sleep Past Week at Unknown time Yes Unknown, Entered By History     metoprolol succinate ER (TOPROL XL) 50 MG 24 hr tablet Take 1 tablet (50 mg) by mouth daily 6/24/2022 at Unknown time Yes Addy  MD Jalen Yes    triamcinolone (KENALOG) 0.5 % external ointment APPLY TOPICALLY TWO TIMES ADAY  Patient taking differently: Apply topically 2 times daily as needed Past Week at Unknown time Yes Jalen Daly MD

## 2022-06-25 NOTE — PLAN OF CARE
Patient's After Visit Summary was reviewed with patient and/or spouse.   Patient verbalized understanding of After Visit Summary, recommended follow up and was given an opportunity to ask questions.   Discharge medications sent home with patient/family: No, filled at [facility] per patient request.   Discharged with spouse.    Discharged in stable condition with spouse as ride. Ma teaching done with patient and wife; provided with handout from [employer], demonstrated how to change from leg bag to standard bag. Patient and wife verbalized feeling comfortable with plan. Information given for Urology follow up.     OBSERVATION patient END time: 1500

## 2022-06-25 NOTE — ED NOTES
Bed: FT01  Expected date:   Expected time:   Means of arrival:   Comments:  Triage - urinary retention

## 2022-06-25 NOTE — TELEPHONE ENCOUNTER
Patient had hernia surgery today.  Before leaving the hospital he only had a small amount.  Patient is urinating at home but only a very small amount.  Patient states after he goes to the bathroom and a small amount comes out he still feels like he has to go.  He has been in the bathroom since he got home.      Per the AVS instructions if this occurs for the patient he needs to be seen in the ED.  Patient states he will go to [facility] for evaluation now.  His wife will bring him.    Suzanna Huang RN   06/24/22 9:27 PM  [facility] Nurse Advisor    Reason for Disposition    [1] Caller has URGENT question AND [2] triager unable to answer question    Additional Information    Negative: Sounds like a life-threatening emergency to the triager    Negative: Chest pain    Negative: Difficulty breathing    Negative: Acting confused (e.g., disoriented, slurred speech) or excessively sleepy    Negative: Surgical incision symptoms and questions    Negative: [1] Discomfort (pain, burning or stinging) when passing urine AND [2] male    Negative: [1] Discomfort (pain, burning or stinging) when passing urine AND [2] female    Negative: Constipation    Negative: New or worsening leg (calf, thigh) pain    Negative: New or worsening leg swelling    Negative: Dizziness is severe, or persists > 24 hours after surgery    Negative: Pain, redness, swelling, or pus at IV Site    Negative: Symptoms arising from use of a urinary catheter (Ma or Coude)    Negative: Cast problems or questions    Negative: Medication question    Negative: [1] Widespread rash AND [2] bright red, sunburn-like    Negative: [1] SEVERE headache AND [2] after spinal (epidural) anesthesia    Negative: [1] Vomiting AND [2] persists > 4 hours    Negative: [1] Vomiting AND [2] abdomen looks much more swollen than usual    Negative: [1] Drinking very little AND [2] dehydration suspected (e.g., no urine > 12 hours, very dry mouth, very lightheaded)     Negative: Patient sounds very sick or weak to the triager    Negative: Sounds like a serious complication to the triager    Negative: Fever > 100.4 F (38.0 C)    Negative: [1] SEVERE post-op pain (e.g., excruciating, pain scale 8-10) AND [2] not controlled with pain medications    Protocols used: POST-OP SYMPTOMS AND [id number]

## 2022-06-25 NOTE — H&P
[facility]    History and Physical - Hospitalist Service       Date of Admission:  6/24/2022    Assessment & Plan      Terry Verduzco is a 77 year old male with history of hypertension, dyslipidemia, borderline diabetes, osteoarthritis, and psoriasis.  He had right inguinal hernia repair yesterday (6/24/2022), performed by Dr. Saravia at this hospital.  He presented to the [facility] emergency department last evening (6/24/2022) for evaluation of urine retention.  He had been unable to void and was generally uncomfortable.  His wife had also noted some confusion since returning home.  He denied chest pain, shortness of breath, nausea, vomiting, diarrhea, and dysuria.  He denied focal weakness, dysarthria, and dysphagia.  Emergency department evaluation showed heart rate of 103 but otherwise unremarkable vital signs.  Ma catheter was placed and greater than 700 mL of urine returned.  Laboratory evaluation showed sodium 124, BUN 37, creatinine 1.41, and otherwise unremarkable basic metabolic panel and CBC.  Urinalysis showed large blood with 3 white blood cells and no leukocyte esterase.  Of note, COVID-19 testing was negative on 6/21/2022.  Of note, patient has not had hyponatremia in the past.  Last sodium was on 5/25/2022 and was 139.  Also of note, after placement of Ma catheter patient had brisk diuresis.  I was asked to admit this patient to the hospital with urine retention, acute kidney injury (likely postobstructive), postobstructive diuresis, and acute hyponatremia.    Problem list:    Acute urine retention  Postobstructive acute kidney injury  Postobstructive diuresis  -Patient has some symptoms of mild retention at baseline with only nocturia.  I suspect that he has acute urine retention related to recent surgery, anesthesia, and opiate pain medications.  -Admit to observation  -Leave Ma catheter in place now and likely on discharge  -Monitor intake/output  and daily weights  -Normal saline at 100 mL/h.  Take special attention to output as he may need additional IV fluid because of brisk postobstructive diuresis.  -Daily basic metabolic panel  -I have started Flomax 0.4 mg daily  -Will likely need outpatient urology follow-up for Ma removal and voiding trial  -Hold prior to admission Zestoretic with acute kidney injury    Hyponatremia  -Suspect acute and related to decreased oral intake around surgery  -Possibly exacerbated by prior to admission Zestoretic, which will be held  -Continue normal saline hydration  -Follow serial basic metabolic panel  -Check serum osmolality and urine osmolality as well as urine sodium    Postop day 1 after right inguinal hernia repair  -Surgery was performed by Dr. Saravia  -Continue Norco as needed for pain  -Analgesic medications available if needed    Dyslipidemia  -Hold prior to admission fenofibrate and Lipitor with observation admission    Hypertension  -Hold prior to admission Zestoretic with acute kidney injury and hyponatremia  -Resume prior to admission metoprolol    Borderline diabetes  -Monitor blood sugar with daily labs    COVID-19 testing was negative on 6/21/2022     Diet:  Regular  DVT Prophylaxis: Low Risk/Ambulatory with no VTE prophylaxis indicated  Ma Catheter: PRESENT, indication:    Central Lines: None  Cardiac Monitoring: None  Code Status:  Full code          Disposition Plan    Likely home later today or tomorrow     The patient's care was discussed with the patient and his wife    Fernando Landon MD  Hospitalist Service  [facility]  Securely message with the Vocera Web Console (learn more here)  Text page via [employer] Paging/Directory         ______________________________________________________________________    Chief Complaint   Urine retention after hernia repair surgery    History is obtained from the patient, his wife, Dr. Vicente, and the medical record    History of  Present Illness   Terry Verduzco is a 77 year old male with history of hypertension, dyslipidemia, borderline diabetes, osteoarthritis, and psoriasis.  He had right inguinal hernia repair yesterday (6/24/2022), performed by Dr. Saravia at this hospital.  He presented to the [facility] emergency department last evening (6/24/2022) for evaluation of urine retention.  He had been unable to void and was generally uncomfortable.  His wife had also noted some confusion since returning home.  He denied chest pain, shortness of breath, nausea, vomiting, diarrhea, and dysuria.  He denied focal weakness, dysarthria, and dysphagia.  Emergency department evaluation showed heart rate of 103 but otherwise unremarkable vital signs.  Ma catheter was placed and greater than 700 mL of urine returned.  Laboratory evaluation showed sodium 124, BUN 37, creatinine 1.41, and otherwise unremarkable basic metabolic panel and CBC.  Urinalysis showed large blood with 3 white blood cells and no leukocyte esterase.  Of note, COVID-19 testing was negative on 6/21/2022.  Of note, patient has not had hyponatremia in the past.  Last sodium was on 5/25/2022 and was 139.  Also of note, after placement of Ma catheter patient had brisk diuresis.  I was asked to admit this patient to the hospital with urine retention, acute kidney injury (likely postobstructive), postobstructive diuresis, and acute hyponatremia.    Review of Systems    The 10 point Review of Systems is negative other than noted in the HPI or here.     Past Medical History    I have reviewed this patient's medical history and updated it with pertinent information if needed.   Past Medical History:   Diagnosis Date     Hyperlipidemia LDL goal <100      Hypertension goal BP (blood pressure) < 140/90      Prediabetes 01/01/2021     Primary osteoarthritis involving multiple joints      Psoriasis        Past Surgical History   I have reviewed this patient's surgical  history and updated it with pertinent information if needed.  Past Surgical History:   Procedure Laterality Date     SURGICAL HISTORY OF -  Right 1982    right inguinal hernia     SURGICAL HISTORY OF -   1985    cholecystectomy     SURGICAL HISTORY OF -  Left 1964    left testicle removed secondary to injury     SURGICAL HISTORY OF -  Left 01/2021    Left eye - Dr Lees - Macular Hole and Epiretinal Membrane     SURGICAL HISTORY OF -   1997    Right Inguinal hernia     SURGICAL HISTORY OF -  Left 07/2021    Left IOL - Dr Becerra     SURGICAL HISTORY OF -  Right 06/2022    Rt inguinal hernia repair - Dr Saravia       Social History   I have reviewed this patient's social history and updated it with pertinent information if needed.  Social History     Tobacco Use     Smoking status: Never Smoker     Smokeless tobacco: Never Used   Vaping Use     Vaping Use: Never used   Substance Use Topics     Alcohol use: Not Currently     Drug use: Never       Family History   I have reviewed this patient's family history and updated it with pertinent information if needed.  Family History   Problem Relation Age of Onset     Hypertension Mother      Parkinsonism Father      Parkinsonism Brother      Dementia Maternal Grandfather      Cancer Brother         eye cancer, blastomycosis     Cerebrovascular Disease Brother         bike accident in MVA       Prior to Admission Medications   Prior to Admission Medications   Prescriptions Last Dose Informant Patient Reported? Taking?   HYDROcodone-acetaminophen (NORCO) 5-325 MG tablet   No No   Sig: Take 1-2 tablets by mouth every 4 hours as needed for moderate to severe pain   atorvastatin (LIPITOR) 80 MG tablet   No No   Sig: Take 1 tablet (80 mg) by mouth daily   cholecalciferol (VITAMIN D3) 25 mcg (1000 units) capsule   Yes No   Sig: Take 1 capsule by mouth daily   fenofibrate (TRIGLIDE/LOFIBRA) 160 MG tablet   No No   Sig: Take 1 tablet (160 mg) by mouth daily    glucosamine-chondroitin 500-400 MG CAPS per capsule   Yes No   Sig: Take 1 capsule by mouth daily   lisinopril-hydrochlorothiazide (ZESTORETIC) 20-12.5 MG tablet   No No   Sig: Take 1 tablet by mouth daily   metoprolol succinate ER (TOPROL XL) 50 MG 24 hr tablet   No No   Sig: Take 1 tablet (50 mg) by mouth daily   triamcinolone (KENALOG) 0.5 % external ointment   No No   Sig: APPLY TOPICALLY TWO TIMES ADAY      Facility-Administered Medications: None     Allergies   Allergies   Allergen Reactions     Codeine      Tachycardia     Demerol [Meperidine] Rash       Physical Exam   Vital Signs: Temp: 98.8  F (37.1  C)   BP: (!) 142/97 Pulse: 82   Resp: 16 SpO2: 99 %      Weight: 0 lbs 0 oz    GENERAL: Pleasant and cooperative. No acute distress.  EYES: Pupils equal and round. No scleral erythema or icterus.  ENT: External ears are normal without deformity. Posterior oropharynx is without erythem, swelling, or exudate.  NECK: Supple. No masses or swelling. No tenderness. Thyroid is normal without mass or tenderness.  CHEST: Clear to auscultation. Normal breath sounds. No retractions.   CV: Regular rate and rhythm. No JVD. Pulses normal.  ABDOMEN: Bowel sounds present. No tenderness. No masses or hernia.  EXTREMETIES: No clubbing, cyanosis, or ischemia.  SKIN: Warm and dry to touch. No wounds or rashes.  NEUROLOGIC: Strength and sensation are normal. Deep tendon reflexes are normal. Cranial nerves are normal.        Data   Data reviewed today: I reviewed all medications, new labs and imaging results over the last 24 hours.   Recent Labs   Lab 06/24/22  2246 06/24/22  1021   WBC 12.0*  --    HGB 13.9  --    MCV 91  --      --    *  --    POTASSIUM 4.9  --    CHLORIDE 90*  --    CO2 22  --    BUN 37*  --    CR 1.41*  --    ANIONGAP 12  --    TAYLOR 9.2  --    * 137*     No results found for this or any previous visit (from the past 24 hour(s)).

## 2022-06-25 NOTE — ED TRIAGE NOTES
Pt had surgery on Rt inguinal hernia today.  Reports bladder pressure and difficulty urinating since discharge.

## 2022-06-25 NOTE — ED NOTES
[facility]  ED Nurse Handoff Report    Terry Verduzco is a 77 year old male   ED Chief complaint: Urinary Retention  . ED Diagnosis:   Final diagnoses:   Hyponatremia   Urinary retention   Leukocytosis, unspecified type     Allergies:   Allergies   Allergen Reactions     Codeine      Tachycardia     Demerol [Meperidine] Rash       Code Status: Full Code  Activity level - Baseline/Home:  Independent. Activity Level - Current:   Stand by Assist. Lift room needed: No. Bariatric: No   Needed: No   Isolation: No. Infection: Not Applicable.     Vital Signs:   Vitals:    06/24/22 2231 06/25/22 0000   BP: (!) 142/97    Pulse: 103 82   Resp: 16    Temp: 98.8  F (37.1  C)    SpO2: 99%        Cardiac Rhythm:  ,      Pain level:    Patient confused: No. Patient Falls Risk: Yes.   Elimination Status: Urethral catheter (plaza) in place; refer to orders to discontinue as per protocol    Patient Report - Initial Complaint: urinary retention. Focused Assessment: hyponatremic   Tests Performed:   No orders to display     . Abnormal Results:   Labs Ordered and Resulted from Time of ED Arrival to Time of ED Departure   ROUTINE UA WITH MICROSCOPIC REFLEX TO CULTURE - Abnormal       Result Value    Color Urine Light Yellow      Appearance Urine Clear      Glucose Urine Negative      Bilirubin Urine Negative      Ketones Urine Negative      Specific Gravity Urine 1.010      Blood Urine Large (*)     pH Urine 5.5      Protein Albumin Urine Negative      Urobilinogen Urine Normal      Nitrite Urine Negative      Leukocyte Esterase Urine Negative      Bacteria Urine Few (*)     RBC Urine 25 (*)     WBC Urine 3     BASIC METABOLIC PANEL - Abnormal    Sodium 124 (*)     Potassium 4.9      Chloride 90 (*)     Carbon Dioxide (CO2) 22      Anion Gap 12      Urea Nitrogen 37 (*)     Creatinine 1.41 (*)     Calcium 9.2      Glucose 130 (*)     GFR Estimate 52 (*)    CBC WITH PLATELETS AND DIFFERENTIAL - Abnormal    WBC  Count 12.0 (*)     RBC Count 4.49      Hemoglobin 13.9      Hematocrit 40.8      MCV 91      MCH 31.0      MCHC 34.1      RDW 12.0      Platelet Count 200      % Neutrophils 84      % Lymphocytes 9      % Monocytes 7      % Eosinophils 0      % Basophils 0      % Immature Granulocytes 0      NRBCs per 100 WBC 0      Absolute Neutrophils 10.1 (*)     Absolute Lymphocytes 1.1      Absolute Monocytes 0.8      Absolute Eosinophils 0.0      Absolute Basophils 0.0      Absolute Immature Granulocytes 0.0      Absolute NRBCs 0.0     COVID-19 VIRUS (CORONAVIRUS) BY PCR     .   Treatments provided: see MAR  Family Comments: wife at bedside  OBS brochure/video discussed/provided to patient:  Yes  ED Medications:   Medications   0.9% sodium chloride BOLUS (1,000 mLs Intravenous New Bag 6/24/22 6455)   HYDROcodone-acetaminophen (NORCO) 5-325 MG per tablet 1 tablet (has no administration in time range)     Drips infusing:  Yes  For the majority of the shift, the patient's behavior Green. Interventions performed were n/a.    Sepsis treatment initiated: No     Patient tested for COVID 19 prior to admission: YES    ED Nurse Name/Phone Number: Lucy Rosas RN,   12:15 AM    RECEIVING UNIT ED HANDOFF REVIEW    Above ED Nurse Handoff Report was reviewed: Yes  Reviewed by: Betzy Rojas RN on June 25, 2022 at 8:32 AM

## 2022-06-25 NOTE — DISCHARGE SUMMARY
[facility]  Discharge Summary  Hospitalist    Date of Admission:  6/24/2022  Date of Discharge:  6/25/2022  Discharging Provider: Lizzie Maynard PA-C  Date of Service (when I saw the patient): 06/25/22    Discharge Diagnoses   Acute Post operative Urinary Retention  CINDY, post renal obstructive   Hyponatremia     History of Present Illness  Terry Verduzco is a 77 year old male with history of hypertension, dyslipidemia, borderline diabetes, osteoarthritis, and psoriasis.  He had right inguinal hernia repair yesterday (6/24/2022), performed by Dr. Saravia at this hospital.  He presented to the [facility] emergency department last evening (6/24/2022) for evaluation of urine retention.  He had been unable to void and was generally uncomfortable.  His wife had also noted some confusion since returning home.  He denied chest pain, shortness of breath, nausea, vomiting, diarrhea, and dysuria.  He denied focal weakness, dysarthria, and dysphagia.  Emergency department evaluation showed heart rate of 103 but otherwise unremarkable vital signs.  Ma catheter was placed and greater than 700 mL of urine returned.  Laboratory evaluation showed sodium 124, BUN 37, creatinine 1.41, and otherwise unremarkable basic metabolic panel and CBC.  Urinalysis showed large blood with 3 white blood cells and no leukocyte esterase.  Of note, COVID-19 testing was negative on 6/21/2022.  Of note, patient has not had hyponatremia in the past.  Last sodium was on 5/25/2022 and was 139.  Also of note, after placement of Ma catheter patient had brisk diuresis.  I was asked to admit this patient to the hospital with urine retention, acute kidney injury (likely postobstructive), postobstructive diuresis, and acute hyponatremia.    Please see admitting H & P  by Fernando Landon MD, on 6/24/2022 for full details of the encounter.     Hospital Course   Terry Verduzco was admitted on 6/24/2022.  The following  problems were addressed during his hospitalization:    Acute Urinary Retention  CINDY, Post obstructive   Suspected post operative urinary retention related to recent surgery, anesthesia, and opiate pain medications. Was able to void initially post op.   After placement of Ma catheter patient had brisk diuresis (-2500), asymptomatic with this. Ma was not clamped in ED. No further brisk diuresis. Given fluids, BUN normalized. Needs outpt Urology follow up. No e/o UTI. Flomax trial.     Hyponatremia   -Suspect acute related to fluid shifts from surgery, mild SIADH possible contribution from thiazide diuretic. Urine Osm>serum osm. Normal specific gravity and urine Na.   -Improved, given 1 L NS. Not symptomatic with correction.   - Zestoretic temporary hold       Pending Results   None.    Code Status   Full Code       Primary Care Physician   Jalen Daly      INTERVAL HISTORY  Assume care.   From 10 PM to 7 AM ~2500 UO,  Ma not clamped in ED. Over next 5 hours while on the floor no brisk UO, <300cc.     No headache, nausea or emesis. Tolerating diet. No LH, not dizzy with ambulation.   No chest pain, shortness of breath, nausea, vomiting, diarrhea, and dysuria.  No focal weakness, dysarthria, and dysphagia.       /76 (BP Location: Right arm)   Pulse 87   Temp 98.5  F (36.9  C) (Oral)   Resp 16   Ht 1.829 m (6')   Wt 88.4 kg (194 lb 14.4 oz)   SpO2 97%   BMI 26.43 kg/m      Constitutional: Awake, alert, no apparent distress  Respiratory:  Normal work of breathing. Lungs clear to auscultation bilaterally, no crackles or wheezing.  Cardiovascular: Regular rate and rhythm, normal S1 and S2, and no murmur appreciated.   GI: Bowel sounds present. Laparoscopic incisions  CDI. soft, non-distended, mildly tender. No guarding.   Skin/Integument: Warm, dry. no peripheral edema.  Neuro: No focal deficits. Moving all extremities with normal strength. Coordination and sensation grossly intact. Speech clear.    Psych: Appropriate affect.        Discharge Disposition   Discharged to home  Condition at discharge: Stable    Consultations This Hospital Stay   None    Time Spent on this Encounter   ILizzie PA-C, personally saw the patient today and spent greater than 30 minutes discharging this patient.    Discharge Orders      Adult Urology Referral      Reason for your hospital stay    Urinary retention post op     Activity    Your activity upon discharge: activity as tolerated, prior activity restriction per surgical team recommendations     When to contact your care team    Call your primary care doctor if you have any of the following: temperature greater than 101 F, worsening shortness of breath, increased swelling, worsening pain, new or unrelenting diarrhea, or any other concerning symptoms. Call 911 or go to the emergency room if you need immediate assistance.     Follow-up and recommended labs and tests     Follow up with primary care provider, Jalen Daly, within 7 days to evaluate medication change and for hospital follow- up.  The following labs/tests are recommended: BMP.  - Check sodium level and BP. Discuss resumption of Zestoric.   Follow up with urology regarding Ma removal consideration and work-up for urinary retention.  Discussed with urology continuation of Flomax.  Follow-up with your surgeon as previously directed.     Diet    Follow this diet upon discharge: Follow your surgeons guidelines regarding diet     Discharge Medications   Current Discharge Medication List      START taking these medications    Details   tamsulosin (FLOMAX) 0.4 MG capsule Take 1 capsule (0.4 mg) by mouth daily for 14 days Unless otherwise directed by Urologist.  Qty: 14 capsule, Refills: 0    Associated Diagnoses: Urinary retention         CONTINUE these medications which have CHANGED    Details   lisinopril-hydrochlorothiazide (ZESTORETIC) 20-12.5 MG tablet Take 1 tablet by mouth daily Hold until further  direction from PCP or until 6/29  Qty: 90 tablet, Refills: 3    Associated Diagnoses: Hypertension goal BP (blood pressure) < 140/90; Prediabetes         CONTINUE these medications which have NOT CHANGED    Details   atorvastatin (LIPITOR) 80 MG tablet Take 1 tablet (80 mg) by mouth daily  Qty: 90 tablet, Refills: 3    Associated Diagnoses: Prediabetes; Hyperlipidemia LDL goal <100      cholecalciferol (VITAMIN D3) 25 mcg (1000 units) capsule Take 1 capsule by mouth daily      fenofibrate (TRIGLIDE/LOFIBRA) 160 MG tablet Take 1 tablet (160 mg) by mouth daily  Qty: 90 tablet, Refills: 3    Associated Diagnoses: Hyperlipidemia LDL goal <100      glucosamine-chondroitin 500-400 MG CAPS per capsule Take 1 capsule by mouth daily      HYDROcodone-acetaminophen (NORCO) 5-325 MG tablet Take 1-2 tablets by mouth every 4 hours as needed for moderate to severe pain  Qty: 10 tablet, Refills: 0    Associated Diagnoses: Recurrent right inguinal hernia      melatonin 5 MG tablet Take 5 mg by mouth nightly as needed for sleep      metoprolol succinate ER (TOPROL XL) 50 MG 24 hr tablet Take 1 tablet (50 mg) by mouth daily  Qty: 90 tablet, Refills: 3    Associated Diagnoses: Hypertension goal BP (blood pressure) < 140/90      triamcinolone (KENALOG) 0.5 % external ointment APPLY TOPICALLY TWO TIMES ADAY  Qty: 120 g, Refills: 1    Associated Diagnoses: Psoriasis           Allergies   Allergies   Allergen Reactions     Codeine      Tachycardia     Demerol [Meperidine] Rash     Data   Most Recent 3 CBC's:Recent Labs   Lab Test 06/24/22 2246 05/25/22  1008 03/03/21  1005   WBC 12.0* 6.2 8.3   HGB 13.9 14.7 16.4   MCV 91 91 94    261 291      Most Recent 3 BMP's:  Recent Labs   Lab Test 06/25/22  1154 06/25/22  0752 06/24/22 2246 06/24/22  1021 05/25/22  1008    139 124*  --  139   POTASSIUM  --  4.5 4.9  --  4.3   CHLORIDE  --  108 90*  --  107   CO2  --  28 22  --  25   BUN  --  30 37*  --  29   CR  --  1.48* 1.41*  --   1.45*   ANIONGAP  --  3 12  --  7   TAYLOR  --  9.0 9.2  --  9.5   GLC  --  113* 130* 137* 123*     Most Recent 2 LFT's:  Recent Labs   Lab Test 05/25/22  1008 07/05/21  1031   AST 31 35   ALT 29 36   ALKPHOS 59 59   BILITOTAL 1.0 0.8     Most Recent INR's and Anticoagulation Dosing History:  Anticoagulation Dose History    There is no flowsheet data to display.       Most Recent 3 Troponin's:No lab results found.  Most Recent Cholesterol Panel:  Recent Labs   Lab Test 05/25/22  1008   CHOL 203*   *   HDL 45   TRIG 134     Most Recent 6 Bacteria Isolates From Any Culture (See EPIC Reports for Culture Details):No lab results found.  Most Recent TSH, T4 and A1c Labs:  Recent Labs   Lab Test 05/25/22  1008   TSH 1.28   A1C 5.8*     Results for orders placed or performed in visit on 12/27/05   CHEST X-RAY 2 VW    Impression       TWO VIEWS OF THE CHEST        HISTORY:  Pain and difficulty breathing.       FINDINGS:  Negative.   ECHO HEART, FULL STRESS/REST    Impression       Tape # [id number]   INDICATION: Terry Verduzco is a 60-year-old male with chest pain and   rapid heart beat and palpitations.   Patient exercised for 7 minutes of the Richard protocol. Heart rate   went from 109 to 173 beats per minute. Blood pressure at rest was   mildly elevated at 144/90 and cheryl to 200/90.   Patient exercised to the point of fatigue, but did not apparently   develop chest pain.   The patient's electrocardiogram at rest shows mild left axis   deviation, but otherwise normal.   With exercise no significant ST-T changes or arrhythmias occurred.   Echocardiography at rest shows normal LV function.   Post exercise all wall segments improved. No evidence of exercise   induced hypokinesia-ischemia was seen.   IMPRESSION:   1.)Negative stress echocardiographic study.   2.)Negative stress EKG study.   3.)Hypertension at rest and post exercise.   4.)Sinus tachycardia at rest.   One may consider beta blocker therapy to take away some of the  sense   of palpitation. The tachycardia cause is unclear. TSH should be check.       Lizzie Maynard PA-C  [facility]

## 2022-06-27 ENCOUNTER — TELEPHONE (OUTPATIENT)
Dept: UROLOGY | Facility: CLINIC | Age: 77
End: 2022-06-27

## 2022-06-27 ENCOUNTER — PATIENT OUTREACH (OUTPATIENT)
Dept: CARE COORDINATION | Facility: CLINIC | Age: 77
End: 2022-06-27

## 2022-06-27 DIAGNOSIS — Z71.89 OTHER SPECIFIED COUNSELING: ICD-10-CM

## 2022-06-27 LAB
ATRIAL RATE - MUSE: 86 BPM
DIASTOLIC BLOOD PRESSURE - MUSE: NORMAL MMHG
INTERPRETATION ECG - MUSE: NORMAL
P AXIS - MUSE: 81 DEGREES
PR INTERVAL - MUSE: 186 MS
QRS DURATION - MUSE: 108 MS
QT - MUSE: 392 MS
QTC - MUSE: 469 MS
R AXIS - MUSE: 76 DEGREES
SYSTOLIC BLOOD PRESSURE - MUSE: NORMAL MMHG
T AXIS - MUSE: 67 DEGREES
VENTRICULAR RATE- MUSE: 86 BPM

## 2022-06-27 NOTE — TELEPHONE ENCOUNTER
[facility] Call Center    Phone Message    May a detailed message be left on voicemail: yes     Reason for Call: Other: Patient is being referred to Urology for Urinary retention, per guidelines send TE. Please triage and call patient to schedule      Action Taken: Message routed to:  [facility] ([facility] Urology     Travel Screening: Not Applicable

## 2022-06-27 NOTE — PROGRESS NOTES
Clinic Care Coordination Contact  [facility]/Voicemail       Clinical Data: Care Coordinator Outreach  Outreach attempted x 1.  Left message on patient's voicemail with call back information and requested return call.  Plan: Care Coordinator will try to reach patient again in 1-2 business days.    Natalie Cormier  Community Health Worker  [facility]  [phone]

## 2022-06-28 NOTE — PROGRESS NOTES
Clinic Care Coordination Contact  [facility]/Voicemail       Clinical Data: Care Coordinator Outreach  Outreach attempted x 2.  Left message on patient's voicemail with call back information and requested return call.  Plan:  Care Coordinator will do no further outreaches at this time.    Natalie Cormier  Community Health Worker  [facility]  [phone]

## 2022-06-29 ENCOUNTER — VIRTUAL VISIT (OUTPATIENT)
Dept: UROLOGY | Facility: CLINIC | Age: 77
End: 2022-06-29
Payer: COMMERCIAL

## 2022-06-29 VITALS — BODY MASS INDEX: 26.28 KG/M2 | HEIGHT: 72 IN | WEIGHT: 194 LBS

## 2022-06-29 DIAGNOSIS — R33.9 URINARY RETENTION: Primary | ICD-10-CM

## 2022-06-29 PROCEDURE — 99203 OFFICE O/P NEW LOW 30 MIN: CPT | Mod: 95 | Performed by: PHYSICIAN ASSISTANT

## 2022-06-29 RX ORDER — TAMSULOSIN HYDROCHLORIDE 0.4 MG/1
0.4 CAPSULE ORAL DAILY
Qty: 30 CAPSULE | Refills: 0 | Status: SHIPPED | OUTPATIENT
Start: 2022-06-29 | End: 2022-08-01

## 2022-06-29 ASSESSMENT — ENCOUNTER SYMPTOMS
HEMATURIA: 0
FEVER: 0
DYSURIA: 0
DIFFICULTY URINATING: 1
SHORTNESS OF BREATH: 0
FREQUENCY: 0
CHILLS: 0
VOMITING: 0
NAUSEA: 0

## 2022-06-29 ASSESSMENT — PAIN SCALES - GENERAL: PAINLEVEL: NO PAIN (0)

## 2022-06-29 NOTE — LETTER
6/29/2022       RE: Terry Verduzco  [address] [facility] [place] [facility] [address]     Dear Colleague,    Thank you for referring your patient, Terry Verduzco, to the [facility] [place] at [facility]. Please see a copy of my visit note below.    PT WILL MEET YOU IN MYCHART    Ki is a 77 year old who is being evaluated via a billable video visit.      How would you like to obtain your AVS? MyChart  If the video visit is dropped, the invitation should be resent by: Text to cell phone: [phone]  Will anyone else be joining your video visit? No      Video-Visit Details    Video Start Time: 1256    Type of service:  Video Visit    Video End Time:1309    Originating Location (pt. Location): Home    Distant Location (provider location):  [facility] [place]     Platform used for Video Visit: AmIRI     Subjective      REQUESTING PROVIDER   No ref. provider found     REASON FOR CONSULT   Urinary retention    HISTORY OF PRESENT ILLNESS    Mr. Verduzco s a very pleasant, 77 year old male seen today in the urology clinic in hospital follow up of urinary retention. This developed acutely requiring Ma catheter placement on 06/24/22.  Patient had a right inguinal hernia repair that day.     He initially had 700 mL of urine output, and then had brisk diuresis up to 2.5 L.  He was also found to have an acute kidney injury with hyponatremia.  Ma catheter has been draining well with pale, yellow urine. The patient is tolerating the catheter without issue. No clots of hematuria noted.     He does note that he had urinary retention after gallbladder surgery many years ago.  At baseline, patient has nocturia x1.  He denies hematuria, dysuria, urgency, frequency, or urinary incontinence.  He feels like he generally empties his bladder completely and has a medium urinary stream.  No history urinary tract infections.  Does note some  slight constipation, but has been given medication for this.    Patient was started on Flomax 0.4 mg.  He usually does not require any medication to urinate.  They did take part of his appendix and have recommended a CT with and without contrast due to the findings of this.  This is scheduled for 07/08/2022.  He was also recommended to have follow-up laboratory testing regarding his sodium and kidney function.    The following portions of the patient's history were reviewed and updated as appropriate: allergies, current medications, past family history, past medical history, past social history, past surgical history and problem list.     REVIEW OF SYSTEMS   Review of Systems   Constitutional: Negative for chills and fever.   Respiratory: Negative for shortness of breath.    Cardiovascular: Negative for chest pain.   Gastrointestinal: Negative for nausea and vomiting.   Genitourinary: Positive for difficulty urinating. Negative for dysuria, frequency, hematuria and urgency.      Per HPI.     Patient Active Problem List   Diagnosis     Hypertension goal BP (blood pressure) < 140/90     Psoriasis     Hyperlipidemia LDL goal <100     Prediabetes     Primary osteoarthritis involving multiple joints     Urinary retention     Hyponatremia      Past Medical History:   Diagnosis Date     Hyperlipidemia LDL goal <100      Hypertension goal BP (blood pressure) < 140/90      Prediabetes 01/01/2021     Primary osteoarthritis involving multiple joints      Psoriasis       Past Surgical History:   Procedure Laterality Date     CYSTOSCOPY       DAVINCI XI HERNIORRHAPHY INGUINAL Right 06/24/2022    Procedure: Xi Robotic Assisted HERNIORRHAPHY, INGUINAL, LAPAROSCOPIC, WITH MESH - Right;  Surgeon: Charles Elliott MD;  Location:  OR     SURGICAL HISTORY OF -  Right 1982    right inguinal hernia     SURGICAL HISTORY OF -   1985    cholecystectomy     SURGICAL HISTORY OF -  Left 1964    left testicle removed secondary to  injury     SURGICAL HISTORY OF -  Left 01/2021    Left eye - Dr Lees - Macular Hole and Epiretinal Membrane     SURGICAL HISTORY OF -   1997    Right Inguinal hernia     SURGICAL HISTORY OF -  Left 07/2021    Left IOL - Dr Becerra     SURGICAL HISTORY OF -  Right 06/2022    Rt inguinal hernia repair - Dr Saravia     TESTICLE SURGERY       VASECTOMY        Social History:   .  Never smoker.    Objective      PHYSICAL EXAM   GENERAL: Healthy, alert and no distress  EYES: Eyes grossly normal to inspection.  No discharge or erythema, or obvious scleral/conjunctival abnormalities.  HENT: Normal cephalic/atraumatic.  External ears, nose and mouth without ulcers or lesions.  No nasal drainage visible.  NECK: No asymmetry, visible masses or scars  RESP: No audible wheeze, cough, or visible cyanosis.  No visible retractions or increased work of breathing.    MS: No gross musculoskeletal defects noted.  Normal range of motion.  No visible edema.  SKIN: Visible skin clear. No significant rash, abnormal pigmentation or lesions.  NEURO: Cranial nerves grossly intact.  Mentation and speech appropriate for age.  PSYCH: Mentation appears normal, affect normal/bright, judgement and insight intact, normal speech and appearance well-groomed.     LABORATORY   Lab Results   Component Value Date    PSA 3.04 05/25/2022    PSA 2.51 03/03/2021    PSA 2.72 12/29/2020    CR 1.48 (H) 06/25/2022      Recent Labs   Lab Test 06/24/22  2325 05/25/22  1014   COLOR Light Yellow Yellow   APPEARANCE Clear Clear   URINEGLC Negative Negative   URINEBILI Negative Negative   URINEKETONE Negative Negative   SG 1.010 1.025   UBLD Large* Negative   URINEPH 5.5 5.5   PROTEIN Negative Negative   UROBILINOGEN  --  0.2   NITRITE Negative Negative   LEUKEST Negative Negative   RBCU 25*  --    WBCU 3  --      Creatinine 1.48 eGFR 49 (1.41 eGFR 52).    Assessment & Plan    1. Urinary retention       I had the pleasure today of meeting with Mr. Verduzco  and his wife to discuss his acute urinary retention requiring Ma catheter placement.  He is on Flomax.    Based upon the volume that returned quickly after Ma catheter placement, I have recommended that he keep Ma catheter in place for 10 to 14 days after placement.  I would recommend that he come in for a trial of void early next week ideally Tuesday or Wednesday.    He should continue with Flomax 0.4 mg once daily.  He was provided with refills for this.    Should he successfully passed a trial of void and would continue taking Flomax daily and return for a postvoid residual 1 month later.  If that is low, patient can be discharged from urological care.    If he is not able to pass his trial of void next week, patient can learn intermittent catheterization and slowly wean off of this or can have Ma catheter replaced and repeat trial of void approximately 1 week later.  He should continue his Flomax.  If he needs clean intermittent catheterization teaching, would start with 3 times daily.  If postvoid residuals consistently less than 150 mL, patient can reduce by 1 time daily.  If postvoid residuals consistently greater than 500 mL, would increase by 1 times daily.      Should the patient continue to have voiding difficulty, the next appropriate studies would be cystoscopy to evaluate for bladder outlet obstruction.    Signed by:     Angelic Reeves PA-C 6/29/2022

## 2022-06-29 NOTE — PROGRESS NOTES
PT WILL MEET YOU IN [employer]    Ramos is a 77 year old who is being evaluated via a billable video visit.      How would you like to obtain your AVS? WUT  If the video visit is dropped, the invitation should be resent by: Text to cell phone: [phone]  Will anyone else be joining your video visit? No      Video-Visit Details    Video Start Time: 1256    Type of service:  Video Visit    Video End Time:1309    Originating Location (pt. Location): Home    Distant Location (provider location):  [facility] [place]     Platform used for Video Visit: [employer]     Subjective      REQUESTING PROVIDER   No ref. provider found     REASON FOR CONSULT   Urinary retention    HISTORY OF PRESENT ILLNESS    Mr. Verduzco s a very pleasant, 77 year old male seen today in the urology clinic in hospital follow up of urinary retention. This developed acutely requiring Ma catheter placement on 06/24/22.  Patient had a right inguinal hernia repair that day.     He initially had 700 mL of urine output, and then had brisk diuresis up to 2.5 L.  He was also found to have an acute kidney injury with hyponatremia.  Ma catheter has been draining well with pale, yellow urine. The patient is tolerating the catheter without issue. No clots of hematuria noted.     He does note that he had urinary retention after gallbladder surgery many years ago.  At baseline, patient has nocturia x1.  He denies hematuria, dysuria, urgency, frequency, or urinary incontinence.  He feels like he generally empties his bladder completely and has a medium urinary stream.  No history urinary tract infections.  Does note some slight constipation, but has been given medication for this.    Patient was started on Flomax 0.4 mg.  He usually does not require any medication to urinate.  They did take part of his appendix and have recommended a CT with and without contrast due to the findings of this.  This is scheduled for 07/08/2022.  He was  also recommended to have follow-up laboratory testing regarding his sodium and kidney function.    The following portions of the patient's history were reviewed and updated as appropriate: allergies, current medications, past family history, past medical history, past social history, past surgical history and problem list.     REVIEW OF SYSTEMS   Review of Systems   Constitutional: Negative for chills and fever.   Respiratory: Negative for shortness of breath.    Cardiovascular: Negative for chest pain.   Gastrointestinal: Negative for nausea and vomiting.   Genitourinary: Positive for difficulty urinating. Negative for dysuria, frequency, hematuria and urgency.      Per HPI.     Patient Active Problem List   Diagnosis     Hypertension goal BP (blood pressure) < 140/90     Psoriasis     Hyperlipidemia LDL goal <100     Prediabetes     Primary osteoarthritis involving multiple joints     Urinary retention     Hyponatremia      Past Medical History:   Diagnosis Date     Hyperlipidemia LDL goal <100      Hypertension goal BP (blood pressure) < 140/90      Prediabetes 01/01/2021     Primary osteoarthritis involving multiple joints      Psoriasis       Past Surgical History:   Procedure Laterality Date     CYSTOSCOPY       DAVINCI XI HERNIORRHAPHY INGUINAL Right 06/24/2022    Procedure: Xi Robotic Assisted HERNIORRHAPHY, INGUINAL, LAPAROSCOPIC, WITH MESH - Right;  Surgeon: Charles Elliott MD;  Location: [facility] OR     SURGICAL HISTORY OF -  Right 1982    right inguinal hernia     SURGICAL HISTORY OF -   1985    cholecystectomy     SURGICAL HISTORY OF -  Left 1964    left testicle removed secondary to injury     SURGICAL HISTORY OF -  Left 01/2021    Left eye - Dr Lees - Macular Hole and Epiretinal Membrane     SURGICAL HISTORY OF -   1997    Right Inguinal hernia     SURGICAL HISTORY OF -  Left 07/2021    Left IOL - Dr Becerra     SURGICAL HISTORY OF -  Right 06/2022    Rt inguinal hernia repair - Dr Saravia      TESTICLE SURGERY       VASECTOMY        Social History:   .  Never smoker.    Objective      PHYSICAL EXAM   GENERAL: Healthy, alert and no distress  EYES: Eyes grossly normal to inspection.  No discharge or erythema, or obvious scleral/conjunctival abnormalities.  HENT: Normal cephalic/atraumatic.  External ears, nose and mouth without ulcers or lesions.  No nasal drainage visible.  NECK: No asymmetry, visible masses or scars  RESP: No audible wheeze, cough, or visible cyanosis.  No visible retractions or increased work of breathing.    MS: No gross musculoskeletal defects noted.  Normal range of motion.  No visible edema.  SKIN: Visible skin clear. No significant rash, abnormal pigmentation or lesions.  NEURO: Cranial nerves grossly intact.  Mentation and speech appropriate for age.  PSYCH: Mentation appears normal, affect normal/bright, judgement and insight intact, normal speech and appearance well-groomed.     LABORATORY   Lab Results   Component Value Date    PSA 3.04 05/25/2022    PSA 2.51 03/03/2021    PSA 2.72 12/29/2020    CR 1.48 (H) 06/25/2022      Recent Labs   Lab Test 06/24/22  2325 05/25/22  1014   COLOR Light Yellow Yellow   APPEARANCE Clear Clear   URINEGLC Negative Negative   URINEBILI Negative Negative   URINEKETONE Negative Negative   SG 1.010 1.025   UBLD Large* Negative   URINEPH 5.5 5.5   PROTEIN Negative Negative   UROBILINOGEN  --  0.2   NITRITE Negative Negative   LEUKEST Negative Negative   RBCU 25*  --    WBCU 3  --      Creatinine 1.48 eGFR 49 (1.41 eGFR 52).    Assessment & Plan    1. Urinary retention       I had the pleasure today of meeting with Mr. Verduzco and his wife to discuss his acute urinary retention requiring Ma catheter placement.  He is on Flomax.    Based upon the volume that returned quickly after Ma catheter placement, I have recommended that he keep Ma catheter in place for 10 to 14 days after placement.  I would recommend that he come in for a trial  of void early next week ideally Tuesday or Wednesday.    He should continue with Flomax 0.4 mg once daily.  He was provided with refills for this.    Should he successfully passed a trial of void and would continue taking Flomax daily and return for a postvoid residual 1 month later.  If that is low, patient can be discharged from urological care.    If he is not able to pass his trial of void next week, patient can learn intermittent catheterization and slowly wean off of this or can have Ma catheter replaced and repeat trial of void approximately 1 week later.  He should continue his Flomax.  If he needs clean intermittent catheterization teaching, would start with 3 times daily.  If postvoid residuals consistently less than 150 mL, patient can reduce by 1 time daily.  If postvoid residuals consistently greater than 500 mL, would increase by 1 times daily.      Should the patient continue to have voiding difficulty, the next appropriate studies would be cystoscopy to evaluate for bladder outlet obstruction.    Signed by:     Angelic Reeves PA-C 6/29/2022

## 2022-06-29 NOTE — PATIENT INSTRUCTIONS
Continue with Flomax 0.4 mg once daily.  Refills provided.    We will plan on trial of void for next week.  Our office will call to coordinate.    Contact us in the interim with questions, concerns, or changes in symptomatology. [phone] option 2

## 2022-07-05 ENCOUNTER — ALLIED HEALTH/NURSE VISIT (OUTPATIENT)
Dept: UROLOGY | Facility: CLINIC | Age: 77
End: 2022-07-05
Payer: COMMERCIAL

## 2022-07-05 DIAGNOSIS — R33.9 URINARY RETENTION: Primary | ICD-10-CM

## 2022-07-05 PROCEDURE — 51700 IRRIGATION OF BLADDER: CPT

## 2022-07-05 NOTE — PROGRESS NOTES
Terry Verduzco comes into clinic today at the request of JAYDA Bourgeois Ordering Provider for Trial of Void.      Patient's catheter was disconnected from leg bag, a sterile tip syringe was attached to catheter end, and 275 mL of sterile water was instilled via gravity into the bladder.  Removed Ma catheter after deflating balloon completely.  Patient voided 275 mL     Patient allowed to go home without catheter.    Patient was instructed to call our office during office hours if unable to urinate, or to go to the ER if not during office hours.    This service provided today was under the supervising provider of the day JAYDA Bourgeois, who was available if needed.    Supriya Landers, EMT

## 2022-07-08 ENCOUNTER — HOSPITAL ENCOUNTER (OUTPATIENT)
Dept: CT IMAGING | Facility: CLINIC | Age: 77
Discharge: HOME | End: 2022-07-08
Attending: SURGERY | Admitting: SURGERY
Payer: COMMERCIAL

## 2022-07-08 ENCOUNTER — PREP FOR PROCEDURE (OUTPATIENT)
Dept: SURGERY | Facility: CLINIC | Age: 77
End: 2022-07-08

## 2022-07-08 ENCOUNTER — TELEPHONE (OUTPATIENT)
Dept: UROLOGY | Facility: CLINIC | Age: 77
End: 2022-07-08

## 2022-07-08 ENCOUNTER — TELEPHONE (OUTPATIENT)
Dept: SURGERY | Facility: CLINIC | Age: 77
End: 2022-07-08

## 2022-07-08 DIAGNOSIS — K38.8 MUCOCELE OF APPENDIX: Primary | ICD-10-CM

## 2022-07-08 DIAGNOSIS — K38.9 DISORDER OF APPENDIX: ICD-10-CM

## 2022-07-08 PROCEDURE — 74177 CT ABD & PELVIS W/CONTRAST: CPT

## 2022-07-08 PROCEDURE — 258N000003 HC RX IP 258 OP 636: Performed by: SURGERY

## 2022-07-08 PROCEDURE — 250N000011 HC RX IP 250 OP 636: Performed by: SURGERY

## 2022-07-08 RX ORDER — IOPAMIDOL 755 MG/ML
500 INJECTION, SOLUTION INTRAVASCULAR ONCE
Status: COMPLETED | OUTPATIENT
Start: 2022-07-08 | End: 2022-07-08

## 2022-07-08 RX ADMIN — SODIUM CHLORIDE 54 ML: 9 INJECTION, SOLUTION INTRAVENOUS at 10:01

## 2022-07-08 RX ADMIN — IOPAMIDOL 89 ML: 755 INJECTION, SOLUTION INTRAVENOUS at 10:01

## 2022-07-08 NOTE — LETTER
[facility]    [address], [address]  [place], [place] [address]  Phone [phone]  Fax [phone] [address], [address]  [place] [address]  Phone [phone]  Fax [phone]    [email or web address]   Terry Verduzco      Covid Home testing:    COVID testing is needed 1-2 days prior to the surgery date.   Please take a picture of the results and bring that with you when you check in for your surgery.    If you test positive, please call our office at [phone].   Following your testing, you will be required to social distance until your surgery.

## 2022-07-08 NOTE — TELEPHONE ENCOUNTER
Type of surgery: LAPAROSCOPIC APPENDECTOMY     Location of surgery: [facility]  Date and time of surgery: 7/20/2022 @ 10:30 AM   Surgeon: MADONNA MARQUEZ MD    Pre-Op Appt Date: PATIENT TO SCHEDULE    Post-Op Appt Date: PATIENT TO SCHEDULE     Packet sent out: Yes  Pre-cert/Authorization completed:  Not Applicable  Date: 7/8/2022       LAPAROSCOPIC APPENDECTOMY     GENERAL PT INST TO HAVE H&P WITH DR DE OLIVEIRA 60 MIN REQ PA ASSIST RMO NMS

## 2022-07-08 NOTE — TELEPHONE ENCOUNTER
[facility] Call Center    Phone Message    May a detailed message be left on voicemail: yes     Reason for Call: Medication Question or concern regarding medication   Prescription Clarification  Name of Medication: tamsulosin (FLOMAX) 0.4 MG capsule  Prescribing Provider: Leandro   Pharmacy:    [facility]Y [id number] [id number] [address]   What on the order needs clarification? The patients wife called stating she thought the patient was to discontinue this medication? She is wondering if he should stop now that he is doing well? Please advise. Thank you.     Action Taken: Message routed to:  Other: [facility]    Travel Screening: Not Applicable

## 2022-07-08 NOTE — LETTER
[facility]    [address], [address]  [place], [place] [address]  Phone [phone]  Fax [phone] [address], [address]  [facility] [place] [address]  Phone [phone]  Fax [phone]    [email or web address]   July 8, 2022    Terry Verduzco  [address] [facility] [address]    We realize with scheduling surgery, one of your first questions is, how much will this cost?  Below we have provided you with the information you will need to receive an estimate for your surgery.    You are scheduled for the following procedure:  LAPAROSCOPIC APPENDECTOMY        Surgeon:  MADONNA MARQUEZ MD       Physician Assistant:  Yes      Please make sure to have your insurance card available at the time of calling.    Surgeon & Physician Assistant charges and facility charges for [facility], [facility] or [facility]:    Consumer Price Line at [phone]   -  It is important to note that there may be a Physician Assistant assisting with your surgery.  Please be sure to mention this when calling for the estimate.      If you prefer, you can also request a price estimate online by completing the secure form at:  [email or web address]    Facility Charges at [facility], [facility] or [facility]:  [facility] at [phone]  [facility] at [phone]  [facility] at [phone] or [phone]    Anesthesiologist Charges:  [facility] at [phone]    [facility] - Nurse Anesthetist Charges:  [facility] at [phone]

## 2022-07-08 NOTE — RESULT ENCOUNTER NOTE
Patient was phoned by me with result.  I recommend laparoscopic appendectomy to rule out underlying polyp or malignancy; we will call him to schedule this in the near future.

## 2022-07-12 ENCOUNTER — TELEPHONE (OUTPATIENT)
Dept: SURGERY | Facility: CLINIC | Age: 77
End: 2022-07-12

## 2022-07-12 NOTE — TELEPHONE ENCOUNTER
Attempted to call patient for post op check. No answer.  A message was left for patient to call back if they had any questions or concerns.     Jrery Torres PA-C

## 2022-07-18 ENCOUNTER — OFFICE VISIT (OUTPATIENT)
Dept: FAMILY MEDICINE | Facility: CLINIC | Age: 77
End: 2022-07-18
Payer: COMMERCIAL

## 2022-07-18 VITALS
DIASTOLIC BLOOD PRESSURE: 70 MMHG | TEMPERATURE: 98.1 F | HEART RATE: 105 BPM | WEIGHT: 190 LBS | OXYGEN SATURATION: 99 % | RESPIRATION RATE: 12 BRPM | BODY MASS INDEX: 25.73 KG/M2 | HEIGHT: 72 IN | SYSTOLIC BLOOD PRESSURE: 122 MMHG

## 2022-07-18 DIAGNOSIS — K38.8 MUCOCELE OF APPENDIX: ICD-10-CM

## 2022-07-18 DIAGNOSIS — Z01.818 PREOPERATIVE EXAMINATION: Primary | ICD-10-CM

## 2022-07-18 PROCEDURE — 99214 OFFICE O/P EST MOD 30 MIN: CPT | Performed by: FAMILY MEDICINE

## 2022-07-18 ASSESSMENT — PAIN SCALES - GENERAL: PAINLEVEL: NO PAIN (0)

## 2022-07-20 ENCOUNTER — ANESTHESIA (OUTPATIENT)
Dept: SURGERY | Facility: CLINIC | Age: 77
End: 2022-07-20
Payer: COMMERCIAL

## 2022-07-20 ENCOUNTER — ANESTHESIA EVENT (OUTPATIENT)
Dept: SURGERY | Facility: CLINIC | Age: 77
End: 2022-07-20
Payer: COMMERCIAL

## 2022-07-20 ENCOUNTER — HOSPITAL ENCOUNTER (OUTPATIENT)
Facility: CLINIC | Age: 77
Discharge: HOME | End: 2022-07-20
Attending: SURGERY | Admitting: SURGERY
Payer: COMMERCIAL

## 2022-07-20 VITALS
WEIGHT: 186.4 LBS | DIASTOLIC BLOOD PRESSURE: 86 MMHG | TEMPERATURE: 97.4 F | RESPIRATION RATE: 20 BRPM | SYSTOLIC BLOOD PRESSURE: 147 MMHG | BODY MASS INDEX: 25.25 KG/M2 | HEIGHT: 72 IN | HEART RATE: 66 BPM | OXYGEN SATURATION: 96 %

## 2022-07-20 DIAGNOSIS — K38.8 MUCOCELE OF APPENDIX: ICD-10-CM

## 2022-07-20 PROCEDURE — 710N000012 HC RECOVERY PHASE 2, PER MINUTE: Performed by: SURGERY

## 2022-07-20 PROCEDURE — 88304 TISSUE EXAM BY PATHOLOGIST: CPT | Mod: TC | Performed by: SURGERY

## 2022-07-20 PROCEDURE — 250N000011 HC RX IP 250 OP 636: Performed by: NURSE ANESTHETIST, CERTIFIED REGISTERED

## 2022-07-20 PROCEDURE — 370N000017 HC ANESTHESIA TECHNICAL FEE, PER MIN: Performed by: SURGERY

## 2022-07-20 PROCEDURE — 258N000003 HC RX IP 258 OP 636: Performed by: NURSE ANESTHETIST, CERTIFIED REGISTERED

## 2022-07-20 PROCEDURE — 250N000009 HC RX 250: Performed by: SURGERY

## 2022-07-20 PROCEDURE — 250N000013 HC RX MED GY IP 250 OP 250 PS 637: Performed by: SURGERY

## 2022-07-20 PROCEDURE — 250N000011 HC RX IP 250 OP 636: Performed by: ANESTHESIOLOGY

## 2022-07-20 PROCEDURE — 258N000003 HC RX IP 258 OP 636: Performed by: ANESTHESIOLOGY

## 2022-07-20 PROCEDURE — 250N000009 HC RX 250: Performed by: NURSE ANESTHETIST, CERTIFIED REGISTERED

## 2022-07-20 PROCEDURE — 999N000141 HC STATISTIC PRE-PROCEDURE NURSING ASSESSMENT: Performed by: SURGERY

## 2022-07-20 PROCEDURE — 44970 LAPAROSCOPY APPENDECTOMY: CPT | Mod: AS | Performed by: PHYSICIAN ASSISTANT

## 2022-07-20 PROCEDURE — 44970 LAPAROSCOPY APPENDECTOMY: CPT | Performed by: SURGERY

## 2022-07-20 PROCEDURE — 710N000009 HC RECOVERY PHASE 1, LEVEL 1, PER MIN: Performed by: SURGERY

## 2022-07-20 PROCEDURE — 360N000076 HC SURGERY LEVEL 3, PER MIN: Performed by: SURGERY

## 2022-07-20 PROCEDURE — 272N000001 HC OR GENERAL SUPPLY STERILE: Performed by: SURGERY

## 2022-07-20 RX ORDER — SODIUM CHLORIDE, SODIUM LACTATE, POTASSIUM CHLORIDE, CALCIUM CHLORIDE 600; 310; 30; 20 MG/100ML; MG/100ML; MG/100ML; MG/100ML
INJECTION, SOLUTION INTRAVENOUS CONTINUOUS PRN
Status: DISCONTINUED | OUTPATIENT
Start: 2022-07-20 | End: 2022-07-20

## 2022-07-20 RX ORDER — CEFAZOLIN SODIUM 1 G/3ML
INJECTION, POWDER, FOR SOLUTION INTRAMUSCULAR; INTRAVENOUS PRN
Status: DISCONTINUED | OUTPATIENT
Start: 2022-07-20 | End: 2022-07-20

## 2022-07-20 RX ORDER — ONDANSETRON 4 MG/1
4 TABLET, ORALLY DISINTEGRATING ORAL EVERY 30 MIN PRN
Status: DISCONTINUED | OUTPATIENT
Start: 2022-07-20 | End: 2022-07-20 | Stop reason: HOSPADM

## 2022-07-20 RX ORDER — ONDANSETRON 2 MG/ML
4 INJECTION INTRAMUSCULAR; INTRAVENOUS EVERY 30 MIN PRN
Status: DISCONTINUED | OUTPATIENT
Start: 2022-07-20 | End: 2022-07-20 | Stop reason: HOSPADM

## 2022-07-20 RX ORDER — KETOROLAC TROMETHAMINE 15 MG/ML
15 INJECTION, SOLUTION INTRAMUSCULAR; INTRAVENOUS
Status: DISCONTINUED | OUTPATIENT
Start: 2022-07-20 | End: 2022-07-20 | Stop reason: HOSPADM

## 2022-07-20 RX ORDER — PHENYLEPHRINE HYDROCHLORIDE 10 MG/ML
INJECTION INTRAVENOUS PRN
Status: DISCONTINUED | OUTPATIENT
Start: 2022-07-20 | End: 2022-07-20

## 2022-07-20 RX ORDER — FENTANYL CITRATE 50 UG/ML
INJECTION, SOLUTION INTRAMUSCULAR; INTRAVENOUS PRN
Status: DISCONTINUED | OUTPATIENT
Start: 2022-07-20 | End: 2022-07-20

## 2022-07-20 RX ORDER — FENTANYL CITRATE 50 UG/ML
25 INJECTION, SOLUTION INTRAMUSCULAR; INTRAVENOUS
Status: DISCONTINUED | OUTPATIENT
Start: 2022-07-20 | End: 2022-07-20 | Stop reason: HOSPADM

## 2022-07-20 RX ORDER — MEPERIDINE HYDROCHLORIDE 25 MG/ML
25 INJECTION INTRAMUSCULAR; INTRAVENOUS; SUBCUTANEOUS
Status: DISCONTINUED | OUTPATIENT
Start: 2022-07-20 | End: 2022-07-20 | Stop reason: HOSPADM

## 2022-07-20 RX ORDER — FENTANYL CITRATE 50 UG/ML
25 INJECTION, SOLUTION INTRAMUSCULAR; INTRAVENOUS EVERY 5 MIN PRN
Status: DISCONTINUED | OUTPATIENT
Start: 2022-07-20 | End: 2022-07-20 | Stop reason: HOSPADM

## 2022-07-20 RX ORDER — PROPOFOL 10 MG/ML
INJECTION, EMULSION INTRAVENOUS PRN
Status: DISCONTINUED | OUTPATIENT
Start: 2022-07-20 | End: 2022-07-20

## 2022-07-20 RX ORDER — SODIUM CHLORIDE, SODIUM LACTATE, POTASSIUM CHLORIDE, CALCIUM CHLORIDE 600; 310; 30; 20 MG/100ML; MG/100ML; MG/100ML; MG/100ML
INJECTION, SOLUTION INTRAVENOUS CONTINUOUS
Status: DISCONTINUED | OUTPATIENT
Start: 2022-07-20 | End: 2022-07-20 | Stop reason: HOSPADM

## 2022-07-20 RX ORDER — ONDANSETRON 2 MG/ML
INJECTION INTRAMUSCULAR; INTRAVENOUS PRN
Status: DISCONTINUED | OUTPATIENT
Start: 2022-07-20 | End: 2022-07-20

## 2022-07-20 RX ORDER — BUPIVACAINE HYDROCHLORIDE 5 MG/ML
INJECTION, SOLUTION EPIDURAL; INTRACAUDAL PRN
Status: DISCONTINUED | OUTPATIENT
Start: 2022-07-20 | End: 2022-07-20 | Stop reason: HOSPADM

## 2022-07-20 RX ORDER — GLYCOPYRROLATE 0.2 MG/ML
INJECTION, SOLUTION INTRAMUSCULAR; INTRAVENOUS PRN
Status: DISCONTINUED | OUTPATIENT
Start: 2022-07-20 | End: 2022-07-20

## 2022-07-20 RX ORDER — DEXAMETHASONE SODIUM PHOSPHATE 4 MG/ML
INJECTION, SOLUTION INTRA-ARTICULAR; INTRALESIONAL; INTRAMUSCULAR; INTRAVENOUS; SOFT TISSUE PRN
Status: DISCONTINUED | OUTPATIENT
Start: 2022-07-20 | End: 2022-07-20

## 2022-07-20 RX ORDER — NEOSTIGMINE METHYLSULFATE 1 MG/ML
VIAL (ML) INJECTION PRN
Status: DISCONTINUED | OUTPATIENT
Start: 2022-07-20 | End: 2022-07-20

## 2022-07-20 RX ORDER — LIDOCAINE 40 MG/G
CREAM TOPICAL
Status: DISCONTINUED | OUTPATIENT
Start: 2022-07-20 | End: 2022-07-20 | Stop reason: HOSPADM

## 2022-07-20 RX ORDER — LABETALOL HYDROCHLORIDE 5 MG/ML
10 INJECTION, SOLUTION INTRAVENOUS EVERY 10 MIN PRN
Status: DISCONTINUED | OUTPATIENT
Start: 2022-07-20 | End: 2022-07-20 | Stop reason: HOSPADM

## 2022-07-20 RX ORDER — HYDROCODONE BITARTRATE AND ACETAMINOPHEN 5; 325 MG/1; MG/1
1 TABLET ORAL
Status: COMPLETED | OUTPATIENT
Start: 2022-07-20 | End: 2022-07-20

## 2022-07-20 RX ORDER — LIDOCAINE HYDROCHLORIDE 10 MG/ML
INJECTION, SOLUTION INFILTRATION; PERINEURAL PRN
Status: DISCONTINUED | OUTPATIENT
Start: 2022-07-20 | End: 2022-07-20

## 2022-07-20 RX ORDER — HYDROCODONE BITARTRATE AND ACETAMINOPHEN 5; 325 MG/1; MG/1
1-2 TABLET ORAL EVERY 4 HOURS PRN
Qty: 10 TABLET | Refills: 0 | Status: SHIPPED | OUTPATIENT
Start: 2022-07-20 | End: 2022-11-07

## 2022-07-20 RX ORDER — OXYCODONE HYDROCHLORIDE 5 MG/1
5 TABLET ORAL EVERY 4 HOURS PRN
Status: DISCONTINUED | OUTPATIENT
Start: 2022-07-20 | End: 2022-07-20 | Stop reason: HOSPADM

## 2022-07-20 RX ORDER — PROPOFOL 10 MG/ML
INJECTION, EMULSION INTRAVENOUS CONTINUOUS PRN
Status: DISCONTINUED | OUTPATIENT
Start: 2022-07-20 | End: 2022-07-20

## 2022-07-20 RX ORDER — HYDROMORPHONE HCL IN WATER/PF 6 MG/30 ML
0.4 PATIENT CONTROLLED ANALGESIA SYRINGE INTRAVENOUS EVERY 5 MIN PRN
Status: DISCONTINUED | OUTPATIENT
Start: 2022-07-20 | End: 2022-07-20 | Stop reason: HOSPADM

## 2022-07-20 RX ADMIN — SODIUM CHLORIDE, POTASSIUM CHLORIDE, SODIUM LACTATE AND CALCIUM CHLORIDE: 600; 310; 30; 20 INJECTION, SOLUTION INTRAVENOUS at 07:52

## 2022-07-20 RX ADMIN — LIDOCAINE HYDROCHLORIDE 50 MG: 10 INJECTION, SOLUTION INFILTRATION; PERINEURAL at 09:15

## 2022-07-20 RX ADMIN — NEOSTIGMINE METHYLSULFATE 4 MG: 1 INJECTION, SOLUTION INTRAVENOUS at 09:56

## 2022-07-20 RX ADMIN — SODIUM CHLORIDE, POTASSIUM CHLORIDE, SODIUM LACTATE AND CALCIUM CHLORIDE: 600; 310; 30; 20 INJECTION, SOLUTION INTRAVENOUS at 09:07

## 2022-07-20 RX ADMIN — FENTANYL CITRATE 25 MCG: 0.05 INJECTION, SOLUTION INTRAMUSCULAR; INTRAVENOUS at 10:21

## 2022-07-20 RX ADMIN — PROPOFOL 140 MG: 10 INJECTION, EMULSION INTRAVENOUS at 09:15

## 2022-07-20 RX ADMIN — PROPOFOL 50 MCG/KG/MIN: 10 INJECTION, EMULSION INTRAVENOUS at 09:30

## 2022-07-20 RX ADMIN — ONDANSETRON HYDROCHLORIDE 4 MG: 2 INJECTION, SOLUTION INTRAVENOUS at 09:44

## 2022-07-20 RX ADMIN — HYDROCODONE BITARTRATE AND ACETAMINOPHEN 1 TABLET: 5; 325 TABLET ORAL at 10:47

## 2022-07-20 RX ADMIN — GLYCOPYRROLATE 0.6 MG: 0.2 INJECTION, SOLUTION INTRAMUSCULAR; INTRAVENOUS at 09:56

## 2022-07-20 RX ADMIN — SODIUM CHLORIDE, POTASSIUM CHLORIDE, SODIUM LACTATE AND CALCIUM CHLORIDE: 600; 310; 30; 20 INJECTION, SOLUTION INTRAVENOUS at 10:16

## 2022-07-20 RX ADMIN — PHENYLEPHRINE HYDROCHLORIDE 100 MCG: 10 INJECTION INTRAVENOUS at 09:53

## 2022-07-20 RX ADMIN — ROCURONIUM BROMIDE 50 MG: 50 INJECTION, SOLUTION INTRAVENOUS at 09:16

## 2022-07-20 RX ADMIN — DEXAMETHASONE SODIUM PHOSPHATE 8 MG: 4 INJECTION, SOLUTION INTRA-ARTICULAR; INTRALESIONAL; INTRAMUSCULAR; INTRAVENOUS; SOFT TISSUE at 09:16

## 2022-07-20 RX ADMIN — FENTANYL CITRATE 25 MCG: 0.05 INJECTION, SOLUTION INTRAMUSCULAR; INTRAVENOUS at 10:25

## 2022-07-20 RX ADMIN — CEFAZOLIN 2 G: 1 INJECTION, POWDER, FOR SOLUTION INTRAMUSCULAR; INTRAVENOUS at 09:20

## 2022-07-20 RX ADMIN — FENTANYL CITRATE 25 MCG: 0.05 INJECTION, SOLUTION INTRAMUSCULAR; INTRAVENOUS at 10:16

## 2022-07-20 RX ADMIN — PHENYLEPHRINE HYDROCHLORIDE 100 MCG: 10 INJECTION INTRAVENOUS at 09:31

## 2022-07-20 RX ADMIN — PHENYLEPHRINE HYDROCHLORIDE 100 MCG: 10 INJECTION INTRAVENOUS at 09:34

## 2022-07-20 RX ADMIN — FENTANYL CITRATE 25 MCG: 0.05 INJECTION, SOLUTION INTRAMUSCULAR; INTRAVENOUS at 10:31

## 2022-07-20 RX ADMIN — FENTANYL CITRATE 50 MCG: 50 INJECTION, SOLUTION INTRAMUSCULAR; INTRAVENOUS at 09:29

## 2022-07-20 RX ADMIN — FENTANYL CITRATE 50 MCG: 50 INJECTION, SOLUTION INTRAMUSCULAR; INTRAVENOUS at 09:39

## 2022-07-20 NOTE — ANESTHESIA CARE TRANSFER NOTE
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    Anesthesia Type:   General     Note:    Oropharynx: oropharynx clear of all foreign objects and spontaneously breathing  Level of Consciousness: awake  Oxygen Supplementation: face mask  Level of Supplemental Oxygen (L/min / FiO2): 10  Independent Airway: airway patency satisfactory and stable  Dentition: dentition unchanged  Vital Signs Stable: post-procedure vital signs reviewed and stable  Report to RN Given: handoff report given  Patient transferred to: PACU    Handoff Report: Identifed the Patient, Identified the Reponsible Provider, Reviewed the pertinent medical history, Discussed the surgical course, Reviewed Intra-OP anesthesia mangement and issues during anesthesia, Set expectations for post-procedure period and Allowed opportunity for questions and acknowledgement of understanding      Vitals:  Vitals Value Taken Time   /80 07/20/22 1100   Temp 97.4  F (36.3  C) 07/20/22 1100   Pulse 88 07/20/22 1100   Resp 21 07/20/22 1100   SpO2 96 % 07/20/22 1101   Vitals shown include unvalidated device data.    Electronically Signed By: Dolores Barillas CRNA, APRN CRNA  July 20, 2022  1011 am

## 2022-07-20 NOTE — ANESTHESIA PREPROCEDURE EVALUATION
Anesthesia Pre-Procedure Evaluation    Patient: Terry Verduzco   MRN: [id number] : 1945        Procedure : Procedure(s):  APPENDECTOMY, LAPAROSCOPIC          Past Medical History:   Diagnosis Date     Hyperlipidemia LDL goal <100      Hypertension goal BP (blood pressure) < 140/90      Prediabetes 2021     Primary osteoarthritis involving multiple joints      Psoriasis       Past Surgical History:   Procedure Laterality Date     CYSTOSCOPY       DAVINCI XI HERNIORRHAPHY INGUINAL Right 2022    Procedure: Xi Robotic Assisted HERNIORRHAPHY, INGUINAL, LAPAROSCOPIC, WITH MESH - Right;  Surgeon: Charles Elliott MD;  Location: [facility] OR     SURGICAL HISTORY OF -  Right     right inguinal hernia     SURGICAL HISTORY OF -       cholecystectomy     SURGICAL HISTORY OF -  Left     left testicle removed secondary to injury     SURGICAL HISTORY OF -  Left 2021    Left eye - Dr Lees - Macular Hole and Epiretinal Membrane     SURGICAL HISTORY OF -       Right Inguinal hernia     SURGICAL HISTORY OF -  Left 2021    Left IOL - Dr Becerra     SURGICAL HISTORY OF -  Right 2022    Rt inguinal hernia repair - Dr Saravia     TESTICLE SURGERY       VASECTOMY        Allergies   Allergen Reactions     Codeine      Tachycardia     Demerol [Meperidine] Rash      Social History     Tobacco Use     Smoking status: Never Smoker     Smokeless tobacco: Never Used   Substance Use Topics     Alcohol use: Not Currently      Wt Readings from Last 1 Encounters:   22 84.6 kg (186 lb 6.4 oz)        Anesthesia Evaluation   Pt has had prior anesthetic. Type: General.    No history of anesthetic complications       ROS/MED HX  ENT/Pulmonary:  - neg pulmonary ROS     Neurologic:  - neg neurologic ROS     Cardiovascular:     (+) Dyslipidemia hypertension-----    METS/Exercise Tolerance:     Hematologic:  - neg hematologic  ROS     Musculoskeletal:  - neg musculoskeletal ROS     GI/Hepatic:     (+)  appendicitis,     Renal/Genitourinary:  - neg Renal ROS     Endo:  - neg endo ROS     Psychiatric/Substance Use:  - neg psychiatric ROS     Infectious Disease:  - neg infectious disease ROS     Malignancy:  - neg malignancy ROS     Other:  - neg other ROS          Physical Exam    Airway        Mallampati: II   TM distance: > 3 FB   Neck ROM: full   Mouth opening: > 3 cm    Respiratory Devices and Support         Dental  no notable dental history         Cardiovascular   cardiovascular exam normal          Pulmonary   pulmonary exam normal                OUTSIDE LABS:  CBC:   Lab Results   Component Value Date    WBC 12.0 (H) 06/24/2022    WBC 6.2 05/25/2022    HGB 13.9 06/24/2022    HGB 14.7 05/25/2022    HCT 40.8 06/24/2022    HCT 43.9 05/25/2022     06/24/2022     05/25/2022     BMP:   Lab Results   Component Value Date     06/25/2022     06/25/2022    POTASSIUM 4.5 06/25/2022    POTASSIUM 4.9 06/24/2022    CHLORIDE 108 06/25/2022    CHLORIDE 90 (L) 06/24/2022    CO2 28 06/25/2022    CO2 22 06/24/2022    BUN 30 06/25/2022    BUN 37 (H) 06/24/2022    CR 1.48 (H) 06/25/2022    CR 1.41 (H) 06/24/2022     (H) 06/25/2022     (H) 06/24/2022     COAGS: No results found for: PTT, INR, FIBR  POC: No results found for: BGM, HCG, HCGS  HEPATIC:   Lab Results   Component Value Date    ALBUMIN 3.8 05/25/2022    PROTTOTAL 7.6 05/25/2022    ALT 29 05/25/2022    AST 31 05/25/2022    ALKPHOS 59 05/25/2022    BILITOTAL 1.0 05/25/2022     OTHER:   Lab Results   Component Value Date    A1C 5.8 (H) 05/25/2022    TAYLOR 9.0 06/25/2022    MAG 2.5 (H) 06/25/2022    TSH 1.28 05/25/2022       Anesthesia Plan    ASA Status:  2   NPO Status:  NPO Appropriate    Anesthesia Type: General.     - Airway: ETT   Induction: Intravenous, Propofol.   Maintenance: Balanced.        Consents    Anesthesia Plan(s) and associated risks, benefits, and realistic alternatives discussed. Questions answered and  patient/representative(s) expressed understanding.    - Discussed:     - Discussed with:  Patient         Postoperative Care    Pain management: IV analgesics, Oral pain medications, Multi-modal analgesia.   PONV prophylaxis: Ondansetron (or other 5HT-3), Dexamethasone or Solumedrol     Comments:                Gregg Coy MD

## 2022-07-20 NOTE — ANESTHESIA POSTPROCEDURE EVALUATION
Patient: Terry Verduzco    Procedure: Procedure(s):  LAPAROSCOPIC APPENDECTOMY       Anesthesia Type:  General    Note:     Postop Pain Control: Uneventful            Sign Out: Well controlled pain   PONV: No   Neuro/Psych: Uneventful            Sign Out: Acceptable/Baseline neuro status   Airway/Respiratory: Uneventful            Sign Out: Acceptable/Baseline resp. status   CV/Hemodynamics: Uneventful            Sign Out: Acceptable CV status   Other NRE: NONE   DID A NON-ROUTINE EVENT OCCUR? No           Last vitals:  Vitals Value Taken Time   /89 07/20/22 1015   Temp 98.2  F (36.8  C) 07/20/22 1002   Pulse 81 07/20/22 1030   Resp 18 07/20/22 1030   SpO2 97 % 07/20/22 1030   Vitals shown include unvalidated device data.    Electronically Signed By: Gregg Coy MD  July 20, 2022  10:31 AM

## 2022-07-20 NOTE — DISCHARGE INSTRUCTIONS
HOME CARE FOLLOWING APPENDECTOMY  HORTENCIA Jacobs, CHRIS Chance R. O Donnell, J. Shaheen    INCISIONAL CARE:  Replace the bandage over your incision(s) until all drainage stops, or if more comfortable to have in place. If Dermabond (a type of skin glue) is present, leave in place until it wears/flakes off (2-3 weeks).     BATHING:  OK to shower 48 hours after surgery.  Avoid baths for 1 week after surgery.  You may wash your hair at any time.  Gently pat your incision dry after bathing.  Do not apply lotions, creams, or ointments to incisions.    ACTIVITY:  Light Activity -- you may immediately be up and about as tolerated.  Walking is encouraged, increase as tolerated.  Driving/Light Work-- when comfortable and off narcotic pain medications.  Strenuous Work/Activity -- limit lifting to 20 pounds for 2 weeks.  Progressively increase with time.  Active Sports (running, biking, etc.) -- cautiously resume after 2 weeks.    DISCOMFORT:  Local anesthetic placed at surgery should provide relief for 4-8 hours.  Begin taking pain pills before discomfort is severe.  Take the pain medication with some food, when possible, to minimize side effects.  Intermittent use of ice packs may help during the first 1-3 weeks after surgery.  Expect gradual improvement.    Over-the-counter anti-inflammatory medications (i.e. Ibuprofen/Advil/Motrin or Naprosyn/Aleve) may be used per package instructions in addition to or while tapering off the narcotic pain medications to decrease swelling and sensitivity.  DO NOT TAKE these Anti-inflammatory medications if your primary physician has advised against doing so, or if you have acid reflux, ulcer, or bleeding disorder, or take blood-thinner medications.  Call your primary physician or the surgery office if you have medication questions.  You may have decreased energy level for 1-2 weeks after surgery related to your recovery.    DIET:  Start with liquids and gradually  "resume your regular diet as tolerated.  Consider eating yogurt or taking a probiotic to help your \"gut tarik\" (good bacteria in the bowel/colon) return to normal while taking or after receiving antibiotics.  Drink plenty of fluids.  While taking pain medications, consider use of a stool softener, increase your fiber in your diet, or add a fiber supplement (like Metamucil, Citrucel) to help prevent constipation - a possible side effect of pain medications.    NAUSEA:  If nauseated from the anesthetic/pain meds; rest in bed, get up cautiously with assistance, and drink clear liquids (juice, tea, broth).    FOLLOW-UP AFTER SURGERY:  -Our office will contact you approximately 2-3 weeks after surgery to check on your progress and answer any questions you may have.  If you are doing well, you will not need to return for an office appointment.  If any concerns are identified over the phone, we will help you make an appointment to see a provider.    -If you have not received a phone call, have any questions or concerns, or would like to be seen, please call us at [phone].  We are located at: [address], [address]; [place] [address]    -CONTACT US IF THE FOLLOWING DEVELOPS:   1. A fever that is above 101     2. Increased redness, warmth, drainage, bleeding, or swelling.   3. Pain that is not relieved by rest/ice and your prescription.   4.  Increasing pain after 48 hours.   5. Drainage that is thick, cloudy, yellow, green or white.   6. Any other questions or concerns.      FREQUENTLY ASKED QUESTIONS:    Q:  How should my incision look?    A:  Normally your incision will appear slightly swollen with light redness directly along the incision itself as it heals.  It may feel like a bump or ridge as the healing/scarring happens, and over time (3-4 months) this bump or ridge feeling should slowly go away.  In general, clear or pink watery drainage can be normal at first as your incision heals, but should " decrease over time.    Q:  How do I know if my incision is infected?  A:  Look at your incision for signs of infection, like redness around the incision spreading to surrounding skin, or drainage of cloudy or foul-smelling drainage.  If you feel warm, check your temperature to see if you are running a fever.    **If any of these things occur, please notify the nurse at our office.  We may need you to come into the office for an incision check.      Q:  How do I take care of my incision?  A:  If you had  Dermabond  tissue glue used as a dressing (this causes your incision to look shiny with a clear covering over it) - This type of dressing wears off with time and does not require more dressings over the top unless it is draining around the glue as it wears off.  Do not apply ointments or lotions over the incisions until the glue has completely worn off.    Q:  There is a piece of tape or a sticky  lead  still on my skin.  Can I remove this?  A:  Sometimes the sticky  leads  used for monitoring during surgery or for evaluation in the emergency department are not all removed while you are in the hospital.  These sometimes have a tab or metal dot on them.  You can easily remove these on your own, like taking off a band-aid.  If there is a gel substance under the  lead , simply wipe/clean it off with a washcloth or paper towel.      Q:  What can I do to minimize constipation (very hard stools, or lack of stools)?  A:  Stay well hydrated.  Increase your dietary fiber intake or take a fiber supplement -with plenty of water.  Walk around frequently.  You may consider an over-the-counter stool-softener.  Your Pharmacist can assist you with choosing one that is stocked at your pharmacy.  Constipation is also one of the most common side effects of pain medication.  If you are using pain medication, be pro-active and try to PREVENT problems with constipation by taking the steps above BEFORE constipation becomes a problem.    Q:   What do I do if I need more pain medications?  A:  Call the office to receive refills.  Be aware that certain pain meds cannot be called into a pharmacy and actually require a paper prescription.  A change may be made in your pain med as you progress thru your recovery period or if you have side effects to certain meds.    --Pain meds are NOT refilled after 5pm on weekdays, and NOT AT ALL on the weekends, so please look ahead to prevent problems.    Q:  Why am I having a hard time sleeping now that I am at home?  A:  Many medications you receive while you are in the hospital can impact your sleep for a number of days after your surgery/hospitalization.  Decreased level of activity and naps during the day may also make sleeping at night difficult.  Try to minimize day-time naps, and get up frequently during the day to walk around your home during your recovery time.  Sleep aides may be of some help, but are not recommended for long-term use.      Q:  I am having some back discomfort.  What should I do?  A:  This may be related to certain positioning that was required for your surgery, extended periods of time in bed, or other changes in your overall activity level.  You may try ice, heat, acetaminophen, or ibuprofen to treat this temporarily.  Note that many pain medications have acetaminophen in them and would state this on the prescription bottle.  Be sure not to exceed the maximum of 4000mg per day of acetaminophen.     **If the pain you are having does not resolve, is severe, or is a flare of back pain you have had on other occasions prior to surgery, please contact your primary physician for further recommendations or for an appointment to be examined at their office.    Q:  Why am I having headaches?  A:  Headaches can be caused by many things:  caffeine withdrawal, use of pain meds, dehydration, high blood pressure, lack of sleep, over-activity/exhaustion, flare-up of usual migraine headaches.  If you feel  this is related to muscle tension (a band-like feeling around the head, or a pressure at the low-back of the head) you may try ice or heat to this area.  You may need to drink more fluids (try electrolyte drink like Gatorade), rest, or take your usual migraine medications.   **If your headaches do not resolve, worsen, are accompanied by other symptoms, or if your blood pressure is high, please call your primary physician for recommendation and/or examination.    Q:  I am unable to urinate.  What do I do?  A:  A small percentage of people can have difficulty urinating initially after surgery.  This includes being able to urinate only a very small amount at a time and feeling discomfort or pressure in the very low abdomen.  This is called  urinary retention , and is actually an urgent situation.  Proceed to your nearest Emergency department for evaluation (not an Urgent Care Center).  Sometimes the bladder does not work correctly after certain medications you receive during surgery, or related to certain procedures.  You may need to have a catheter placed until your bladder recovers.  When planning to go to an Emergency department, it may help to call the ER to let them know you are coming in for this problem after a surgery.  This may help you get in quicker to be evaluated.  **If you have symptoms of a urinary tract infection, please contact your primary physician for the proper evaluation and treatment.        If you have other questions, please call the office Monday thru Friday between 8am and 4:30pm to discuss with the Nurse or Physician Assistant.  #[phone]    There is a surgeon ON CALL on weekday evenings and over the weekend in case of urgent need only, and may be contacted at the same number.    If you are having an emergency, call 911 or proceed to your nearest emergency department.    Maximum acetaminophen (Tylenol) dose from all sources should not exceed 4 grams (4000 mg) per day.  Each Norco  contains 325 mg of Tylenol.

## 2022-07-20 NOTE — BRIEF OP NOTE
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    Procedure: Procedure(s):  APPENDECTOMY, LAPAROSCOPIC  Surgeon: Surgeon(s) and Role:     * Charles Elliott MD - Primary     * Graec Sheth PA-C - Assisting  Anesthesia: General   Estimated Blood Loss: Less than 10 ml    Drains: None  Specimens:   ID Type Source Tests Collected by Time Destination   1 : appendix  Tissue Appendix SURGICAL PATHOLOGY EXAM Charles Elliott MD 7/20/2022  9:44 AM      Findings:   Dilated appendix with several cm of normal base, no inflammation, hernia repair intact..  Complications: None.  Implants: * No implants in log *

## 2022-07-20 NOTE — ANESTHESIA PROCEDURE NOTES
Airway       Patient location during procedure: OR       Procedure Start/Stop Times: 7/20/2022 9:18 AM  Staff -        CRNA: Dolores Barillas APRN CRNA       Performed By: CRNA  Consent for Airway        Urgency: elective  Indications and Patient Condition       Indications for airway management: kay-procedural       Induction type:intravenous       Mask difficulty assessment: 1 - vent by mask    Final Airway Details       Final airway type: endotracheal airway       Successful airway: ETT - single  Endotracheal Airway Details        ETT size (mm): 8.0       Cuffed: yes       Successful intubation technique: direct laryngoscopy       DL Blade Type: MAC 3       Grade View of Cords: 1       Adjucts: stylet       Position: Right       Measured from: lips       Secured at (cm): 23       Bite block used: Soft    Post intubation assessment        Placement verified by: capnometry, equal breath sounds and chest rise        Number of attempts at approach: 1       Secured with: plastic tape       Ease of procedure: easy       Dentition: Unchanged    Medication(s) Administered   Medication Administration Time: 7/20/2022 9:18 AM

## 2022-07-20 NOTE — ANESTHESIA POSTPROCEDURE EVALUATION
Patient: Terry Verduzco    Procedure: Procedure(s):  LAPAROSCOPIC APPENDECTOMY       Anesthesia Type:  General    Note:     Postop Pain Control: Uneventful            Sign Out: Well controlled pain   PONV: No   Neuro/Psych: Uneventful            Sign Out: Acceptable/Baseline neuro status   Airway/Respiratory: Uneventful            Sign Out: Acceptable/Baseline resp. status   CV/Hemodynamics: Uneventful            Sign Out: Acceptable CV status   Other NRE: NONE   DID A NON-ROUTINE EVENT OCCUR? No           Last vitals:  Vitals Value Taken Time   /80 07/20/22 1100   Temp 97.4  F (36.3  C) 07/20/22 1100   Pulse 88 07/20/22 1100   Resp 21 07/20/22 1100   SpO2 96 % 07/20/22 1101   Vitals shown include unvalidated device data.    Electronically Signed By: Gregg Coy MD  July 20, 2022  12:05 PM

## 2022-07-20 NOTE — OP NOTE
Procedure Date: 07/20/2022    PREOPERATIVE DIAGNOSIS:  Probable mucocele of the appendix.    POSTOPERATIVE DIAGNOSIS:  Probable mucocele of the appendix.    PROCEDURE:  Laparoscopic appendectomy.    ANESTHESIA:  General plus local.    SURGEON:  Charles Saravia MD.    ASSISTANT:  Grace Osorio PA-C.  The physician assistant was medically necessary for her skills in suturing, cutting, suture exposure, traction, and camera management throughout the operation.    SPECIMENS:  Appendix submitted for routine handling.    COMPLICATIONS:  None.    INDICATIONS FOR PROCEDURE:  Mr. Verduzco is a 77-year-old gentleman who was under my care for a recurrent right inguinal hernia, which was repaired robotically at the end of June.  Findings at that time showed an abnormally dilated and fluid-filled appearing appendix without inflammation.  Subsequently, he underwent a CT scan, which suggested a mucocele of the appendix, and because of this I offered and recommended appendectomy to obviate the possibility of a premalignant polyp or a pseudomyxoma peritonei.  Risks of the operation otherwise were discussed including infection, bleeding, harm to structures, open conversion, and need for additional surgery.  The patient verbalized understanding of the above and consented to proceed.    FINDINGS:  The patient had a dilated fluid-filled normal-appearing appendix without inflammation.  The proximal 2 cm or so of the appendix was entirely normal in caliber.  This was removed intact without disruption.    DESCRIPTION OF PROCEDURE:  With the patient under excellent general anesthesia in supine position, the abdomen was clippered and prepped and draped out in the usual sterile surgical fashion.  A timeout was then performed confirming the patient, procedure to be done, as well as drug allergies.  He did receive a dose of cefoxitin for infection prophylaxis prior to beginning our procedure.  We began by elevating the inferior umbilical skin  fold and incised his previous robotic scar with an 11 blade.  Electrocautery and blunt dissection were then carried out down to the level of the midline fascia, which was then grasped and elevated into view.  The fascia was incised sharply with a 15 blade under direct vision.  Stay sutures were placed in the apices and the peritoneum was punctured bluntly with a Carmalt.  We introduced a Anju port through this defect, applied insufflation, placed the patient head down and leftward down as well.  Two further 5 mm ports were placed along the midline under direct vision.  We surveyed the lower abdomen, we inspected his old hernia site, the peritoneum was well adhered and there was no obvious indentation or recurrent hernia and the mesh seemed to be in good placement.  The appendix was visualized as well.  It was found to be distended approximately 2-3 times its normal size and filled with fluid.  The base of the appendix was visually normal for about 2 cm.  The appendix was mobilized from the retroperitoneum slightly with electrocautery, elevating into view, taking care to only grasp the normal base.  The mesoappendix adjacent to the base was fenestrated with a [place] and a single load of white Endo-BRET staples was fired across the junction of the cecum and base.  The mesentery was then carefully divided using multiple bites of the LigaSure, taking care to only grasp the mesentery and to not disrupt the appendix itself.  The resultant specimen was subsequently placed in an EndoCatch pouch.  Right lower quadrant was observed for hemostasis which was deemed to be adequate.  The lower ports were removed, as was the Anju, insufflation was then released.  The specimen was delivered through the fascial defect in the pouch and passed off for routine pathology.  We closed the fascia with an 0 Vicryl stitch in a figure-of-eight fashion x 2.  The stay suture was then tied down over them.  30 mL of 0.5% plain Marcaine was  infiltrated in all incisions.  Skin was closed with 4-0 Vicryls in deep dermal fashion.  Skin glue was applied to the closed wound thereafter.  The patient tolerated the procedure well and was extubated and brought to recovery in excellent condition.  All sharps and sponge counts were correct at the conclusion of the case.    Charles Martin MD        D: 2022   T: 2022   MT: YONAS    Name:     CLAUDIA DEXTER  MRN:      [id number]        Account:        [id number]   :      1945           Procedure Date: 2022     Document: [id number]    cc:  Jalen Daly MD

## 2022-07-25 LAB
PATH REPORT.COMMENTS IMP SPEC: NORMAL
PATH REPORT.COMMENTS IMP SPEC: NORMAL
PATH REPORT.FINAL DX SPEC: NORMAL
PATH REPORT.GROSS SPEC: NORMAL
PATH REPORT.MICROSCOPIC SPEC OTHER STN: NORMAL
PATH REPORT.RELEVANT HX SPEC: NORMAL
PATHOLOGY SYNOPTIC REPORT: NORMAL
PHOTO IMAGE: NORMAL

## 2022-07-25 PROCEDURE — 88304 TISSUE EXAM BY PATHOLOGIST: CPT | Mod: 26 | Performed by: PATHOLOGY

## 2022-07-26 NOTE — RESULT ENCOUNTER NOTE
Patient was phoned by me with result.  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  Advised that he have a colonoscopy soon if it has been more than 5y since his last.

## 2022-07-28 ENCOUNTER — MYC MEDICAL ADVICE (OUTPATIENT)
Dept: FAMILY MEDICINE | Facility: CLINIC | Age: 77
End: 2022-07-28

## 2022-07-28 DIAGNOSIS — E78.5 HYPERLIPIDEMIA LDL GOAL <100: ICD-10-CM

## 2022-07-29 RX ORDER — FENOFIBRATE 160 MG/1
160 TABLET ORAL DAILY
Qty: 90 TABLET | Refills: 2 | Status: SHIPPED | OUTPATIENT
Start: 2022-07-29 | End: 2022-11-07

## 2022-07-29 NOTE — TELEPHONE ENCOUNTER
Writer reordered mediation with 2 refills, patient had used 1st 90 day supply.   original  rx was 90 days and 3 refills 5/22    [employer] message sent    Jocelyne LOGAN RN   [facility]

## 2022-08-01 DIAGNOSIS — R33.9 URINARY RETENTION: ICD-10-CM

## 2022-08-01 RX ORDER — TAMSULOSIN HYDROCHLORIDE 0.4 MG/1
0.4 CAPSULE ORAL DAILY
Qty: 30 CAPSULE | Refills: 11 | Status: SHIPPED | OUTPATIENT
Start: 2022-08-01 | End: 2022-11-07

## 2022-08-10 ENCOUNTER — TELEPHONE (OUTPATIENT)
Dept: SURGERY | Facility: CLINIC | Age: 77
End: 2022-08-10

## 2022-10-10 ENCOUNTER — HEALTH MAINTENANCE LETTER (OUTPATIENT)
Age: 77
End: 2022-10-10

## 2022-11-07 ENCOUNTER — OFFICE VISIT (OUTPATIENT)
Dept: FAMILY MEDICINE | Facility: CLINIC | Age: 77
End: 2022-11-07
Payer: COMMERCIAL

## 2022-11-07 VITALS
TEMPERATURE: 97.9 F | HEIGHT: 72 IN | SYSTOLIC BLOOD PRESSURE: 134 MMHG | OXYGEN SATURATION: 99 % | HEART RATE: 98 BPM | BODY MASS INDEX: 26.97 KG/M2 | WEIGHT: 199.1 LBS | DIASTOLIC BLOOD PRESSURE: 74 MMHG | RESPIRATION RATE: 16 BRPM

## 2022-11-07 DIAGNOSIS — I10 HYPERTENSION GOAL BP (BLOOD PRESSURE) < 140/90: ICD-10-CM

## 2022-11-07 DIAGNOSIS — Z23 NEED FOR INFLUENZA VACCINATION: ICD-10-CM

## 2022-11-07 DIAGNOSIS — R73.03 PREDIABETES: Primary | ICD-10-CM

## 2022-11-07 DIAGNOSIS — M15.0 PRIMARY OSTEOARTHRITIS INVOLVING MULTIPLE JOINTS: ICD-10-CM

## 2022-11-07 DIAGNOSIS — Z23 NEED FOR COVID-19 VACCINE: ICD-10-CM

## 2022-11-07 DIAGNOSIS — Z51.81 MEDICATION MONITORING ENCOUNTER: ICD-10-CM

## 2022-11-07 DIAGNOSIS — L40.9 PSORIASIS: ICD-10-CM

## 2022-11-07 DIAGNOSIS — E78.5 HYPERLIPIDEMIA LDL GOAL <100: ICD-10-CM

## 2022-11-07 DIAGNOSIS — R33.9 URINARY RETENTION: ICD-10-CM

## 2022-11-07 LAB
ALBUMIN SERPL-MCNC: 3.8 G/DL (ref 3.4–5)
ALBUMIN UR-MCNC: NEGATIVE MG/DL
ALP SERPL-CCNC: 56 U/L (ref 40–150)
ALT SERPL W P-5'-P-CCNC: 37 U/L (ref 0–70)
ANION GAP SERPL CALCULATED.3IONS-SCNC: 5 MMOL/L (ref 3–14)
APPEARANCE UR: CLEAR
AST SERPL W P-5'-P-CCNC: 38 U/L (ref 0–45)
BILIRUB SERPL-MCNC: 0.8 MG/DL (ref 0.2–1.3)
BILIRUB UR QL STRIP: NEGATIVE
BUN SERPL-MCNC: 34 MG/DL (ref 7–30)
CALCIUM SERPL-MCNC: 10.2 MG/DL (ref 8.5–10.1)
CHLORIDE BLD-SCNC: 107 MMOL/L (ref 94–109)
CHOLEST SERPL-MCNC: 175 MG/DL
CK SERPL-CCNC: 242 U/L (ref 30–300)
CO2 SERPL-SCNC: 28 MMOL/L (ref 20–32)
COLOR UR AUTO: YELLOW
CREAT SERPL-MCNC: 1.39 MG/DL (ref 0.66–1.25)
CREAT UR-MCNC: 103 MG/DL
ERYTHROCYTE [DISTWIDTH] IN BLOOD BY AUTOMATED COUNT: 12.6 % (ref 10–15)
FASTING STATUS PATIENT QL REPORTED: YES
GFR SERPL CREATININE-BSD FRML MDRD: 53 ML/MIN/1.73M2
GLUCOSE BLD-MCNC: 121 MG/DL (ref 70–99)
GLUCOSE UR STRIP-MCNC: NEGATIVE MG/DL
HBA1C MFR BLD: 5.5 % (ref 0–5.6)
HCT VFR BLD AUTO: 43.8 % (ref 40–53)
HDLC SERPL-MCNC: 46 MG/DL
HGB BLD-MCNC: 14.7 G/DL (ref 13.3–17.7)
HGB UR QL STRIP: NEGATIVE
KETONES UR STRIP-MCNC: NEGATIVE MG/DL
LDLC SERPL CALC-MCNC: 101 MG/DL
LEUKOCYTE ESTERASE UR QL STRIP: NEGATIVE
MCH RBC QN AUTO: 30.8 PG (ref 26.5–33)
MCHC RBC AUTO-ENTMCNC: 33.6 G/DL (ref 31.5–36.5)
MCV RBC AUTO: 92 FL (ref 78–100)
MICROALBUMIN UR-MCNC: 6 MG/L
MICROALBUMIN/CREAT UR: 5.83 MG/G CR (ref 0–17)
NITRATE UR QL: NEGATIVE
NONHDLC SERPL-MCNC: 129 MG/DL
PH UR STRIP: 6 [PH] (ref 5–7)
PLATELET # BLD AUTO: 247 10E3/UL (ref 150–450)
POTASSIUM BLD-SCNC: 4.5 MMOL/L (ref 3.4–5.3)
PROT SERPL-MCNC: 7.7 G/DL (ref 6.8–8.8)
RBC # BLD AUTO: 4.77 10E6/UL (ref 4.4–5.9)
SODIUM SERPL-SCNC: 140 MMOL/L (ref 133–144)
SP GR UR STRIP: 1.02 (ref 1–1.03)
TRIGL SERPL-MCNC: 142 MG/DL
TSH SERPL DL<=0.005 MIU/L-ACNC: 1.13 MU/L (ref 0.4–4)
UROBILINOGEN UR STRIP-ACNC: 0.2 E.U./DL
WBC # BLD AUTO: 7.8 10E3/UL (ref 4–11)

## 2022-11-07 PROCEDURE — 0134A COVID-19,PF,MODERNA BIVALENT: CPT | Performed by: FAMILY MEDICINE

## 2022-11-07 PROCEDURE — 85027 COMPLETE CBC AUTOMATED: CPT | Performed by: FAMILY MEDICINE

## 2022-11-07 PROCEDURE — 81003 URINALYSIS AUTO W/O SCOPE: CPT | Performed by: FAMILY MEDICINE

## 2022-11-07 PROCEDURE — 80053 COMPREHEN METABOLIC PANEL: CPT | Performed by: FAMILY MEDICINE

## 2022-11-07 PROCEDURE — 80061 LIPID PANEL: CPT | Performed by: FAMILY MEDICINE

## 2022-11-07 PROCEDURE — 84443 ASSAY THYROID STIM HORMONE: CPT | Performed by: FAMILY MEDICINE

## 2022-11-07 PROCEDURE — 90662 IIV NO PRSV INCREASED AG IM: CPT | Performed by: FAMILY MEDICINE

## 2022-11-07 PROCEDURE — 82043 UR ALBUMIN QUANTITATIVE: CPT | Performed by: FAMILY MEDICINE

## 2022-11-07 PROCEDURE — 83036 HEMOGLOBIN GLYCOSYLATED A1C: CPT | Performed by: FAMILY MEDICINE

## 2022-11-07 PROCEDURE — 82550 ASSAY OF CK (CPK): CPT | Performed by: FAMILY MEDICINE

## 2022-11-07 PROCEDURE — 36415 COLL VENOUS BLD VENIPUNCTURE: CPT | Performed by: FAMILY MEDICINE

## 2022-11-07 PROCEDURE — 91313 COVID-19,PF,MODERNA BIVALENT: CPT | Performed by: FAMILY MEDICINE

## 2022-11-07 PROCEDURE — 99214 OFFICE O/P EST MOD 30 MIN: CPT | Mod: 25 | Performed by: FAMILY MEDICINE

## 2022-11-07 PROCEDURE — G0008 ADMIN INFLUENZA VIRUS VAC: HCPCS | Performed by: FAMILY MEDICINE

## 2022-11-07 RX ORDER — METOPROLOL SUCCINATE 50 MG/1
50 TABLET, EXTENDED RELEASE ORAL DAILY
Qty: 90 TABLET | Refills: 3 | Status: SHIPPED | OUTPATIENT
Start: 2022-11-07 | End: 2023-06-13

## 2022-11-07 RX ORDER — ATORVASTATIN CALCIUM 80 MG/1
80 TABLET, FILM COATED ORAL DAILY
Qty: 90 TABLET | Refills: 3 | Status: SHIPPED | OUTPATIENT
Start: 2022-11-07 | End: 2023-06-13

## 2022-11-07 RX ORDER — FENOFIBRATE 160 MG/1
160 TABLET ORAL DAILY
Qty: 90 TABLET | Refills: 3 | Status: SHIPPED | OUTPATIENT
Start: 2022-11-07 | End: 2023-06-13

## 2022-11-07 RX ORDER — LISINOPRIL AND HYDROCHLOROTHIAZIDE 12.5; 2 MG/1; MG/1
1 TABLET ORAL DAILY
Qty: 90 TABLET | Refills: 3 | Status: SHIPPED | OUTPATIENT
Start: 2022-11-07 | End: 2023-06-13

## 2022-11-07 RX ORDER — TAMSULOSIN HYDROCHLORIDE 0.4 MG/1
0.4 CAPSULE ORAL DAILY
Qty: 90 CAPSULE | Refills: 3 | Status: SHIPPED | OUTPATIENT
Start: 2022-11-07 | End: 2023-06-13

## 2022-11-07 NOTE — LETTER
[facility]  [address] [facility] [place] [address]  [phone]                    November 9, 2022    Terry Verduzco  [address] [place] [id number]      Dear Terry,    Here is a summary of your recent test results:    Labs are overall quite good, stable     We advise:     Continue current cares.   Balanced low cholesterol diet.   Regular exercise.     Complete physical in the spring       Your test results are enclosed.      Please contact me if you have any questions.           Thank you very much for trusting [facility].     Healthy regards,          Jalen Daly M.D.        Results for orders placed or performed in visit on 11/07/22   Comprehensive metabolic panel     Status: Abnormal   Result Value Ref Range    Sodium 140 133 - 144 mmol/L    Potassium 4.5 3.4 - 5.3 mmol/L    Chloride 107 94 - 109 mmol/L    Carbon Dioxide (CO2) 28 20 - 32 mmol/L    Anion Gap 5 3 - 14 mmol/L    Urea Nitrogen 34 (H) 7 - 30 mg/dL    Creatinine 1.39 (H) 0.66 - 1.25 mg/dL    Calcium 10.2 (H) 8.5 - 10.1 mg/dL    Glucose 121 (H) 70 - 99 mg/dL    Alkaline Phosphatase 56 40 - 150 U/L    AST 38 0 - 45 U/L    ALT 37 0 - 70 U/L    Protein Total 7.7 6.8 - 8.8 g/dL    Albumin 3.8 3.4 - 5.0 g/dL    Bilirubin Total 0.8 0.2 - 1.3 mg/dL    GFR Estimate 53 (L) >60 mL/min/1.73m2   Lipid panel reflex to direct LDL Fasting     Status: Abnormal   Result Value Ref Range    Cholesterol 175 <200 mg/dL    Triglycerides 142 <150 mg/dL    Direct Measure HDL 46 >=40 mg/dL    LDL Cholesterol Calculated 101 (H) <=100 mg/dL    Non HDL Cholesterol 129 <130 mg/dL    Patient Fasting > 8hrs? Yes     Narrative    Cholesterol  Desirable:  <200 mg/dL    Triglycerides  Normal:  Less than 150 mg/dL  Borderline High:  150-199 mg/dL  High:  200-499 mg/dL  Very High:  Greater than or equal to 500 mg/dL    Direct Measure HDL  Female:  Greater than or equal to 50 mg/dL   Male:  Greater than or equal to 40  mg/dL    LDL Cholesterol  Desirable:  <100mg/dL  Above Desirable:  100-129 mg/dL   Borderline High:  130-159 mg/dL   High:  160-189 mg/dL   Very High:  >= 190 mg/dL    Non HDL Cholesterol  Desirable:  130 mg/dL  Above Desirable:  130-159 mg/dL  Borderline High:  160-189 mg/dL  High:  190-219 mg/dL  Very High:  Greater than or equal to 220 mg/dL   CBC with platelets     Status: Normal   Result Value Ref Range    WBC Count 7.8 4.0 - 11.0 10e3/uL    RBC Count 4.77 4.40 - 5.90 10e6/uL    Hemoglobin 14.7 13.3 - 17.7 g/dL    Hematocrit 43.8 40.0 - 53.0 %    MCV 92 78 - 100 fL    MCH 30.8 26.5 - 33.0 pg    MCHC 33.6 31.5 - 36.5 g/dL    RDW 12.6 10.0 - 15.0 %    Platelet Count 247 150 - 450 10e3/uL   CK total     Status: Normal   Result Value Ref Range     30 - 300 U/L   UA reflex to Microscopic and Culture     Status: Normal    Specimen: Urine, Midstream   Result Value Ref Range    Color Urine Yellow Colorless, Straw, Light Yellow, Yellow    Appearance Urine Clear Clear    Glucose Urine Negative Negative, 1000 , >=2000 mg/dL    Bilirubin Urine Negative Negative    Ketones Urine Negative Negative, 160  mg/dL    Specific Gravity Urine 1.025 1.003 - 1.035    Blood Urine Negative Negative    pH Urine 6.0 5.0 - 7.0    Protein Albumin Urine Negative Negative, 300 , >=2000 mg/dL    Urobilinogen Urine 0.2 0.2, 1.0 E.U./dL    Nitrite Urine Negative Negative    Leukocyte Esterase Urine Negative Negative    Narrative    Microscopic not indicated   Albumin Random Urine Quantitative with Creat Ratio     Status: None   Result Value Ref Range    Creatinine Urine mg/dL 103 mg/dL    Albumin Urine mg/L 6 mg/L    Albumin Urine mg/g Cr 5.83 0.00 - 17.00 mg/g Cr   TSH with free T4 reflex     Status: Normal   Result Value Ref Range    TSH 1.13 0.40 - 4.00 mU/L   Hemoglobin A1c     Status: Normal   Result Value Ref Range    Hemoglobin A1C 5.5 0.0 - 5.6 %

## 2022-11-07 NOTE — PROGRESS NOTES
Assessment & Plan       ICD-10-CM    1. Prediabetes  R73.03 Comprehensive metabolic panel     Lipid panel reflex to direct LDL Fasting     UA reflex to Microscopic and Culture     Albumin Random Urine Quantitative with Creat Ratio     TSH with free T4 reflex     Hemoglobin A1c     atorvastatin (LIPITOR) 80 MG tablet     lisinopril-hydrochlorothiazide (ZESTORETIC) 20-12.5 MG tablet     Comprehensive metabolic panel     Lipid panel reflex to direct LDL Fasting     UA reflex to Microscopic and Culture     Albumin Random Urine Quantitative with Creat Ratio     TSH with free T4 reflex     Hemoglobin A1c      2. Hypertension goal BP (blood pressure) < 140/90  I10 Comprehensive metabolic panel     UA reflex to Microscopic and Culture     Albumin Random Urine Quantitative with Creat Ratio     TSH with free T4 reflex     lisinopril-hydrochlorothiazide (ZESTORETIC) 20-12.5 MG tablet     metoprolol succinate ER (TOPROL XL) 50 MG 24 hr tablet     Comprehensive metabolic panel     UA reflex to Microscopic and Culture     Albumin Random Urine Quantitative with Creat Ratio     TSH with free T4 reflex      3. Hyperlipidemia LDL goal <100  E78.5 Comprehensive metabolic panel     Lipid panel reflex to direct LDL Fasting     CK total     atorvastatin (LIPITOR) 80 MG tablet     fenofibrate (TRIGLIDE/LOFIBRA) 160 MG tablet     Comprehensive metabolic panel     Lipid panel reflex to direct LDL Fasting     CK total      4. Primary osteoarthritis involving multiple joints  M15.9       5. Psoriasis  L40.9       6. Urinary retention  R33.9 tamsulosin (FLOMAX) 0.4 MG capsule      7. Medication monitoring encounter  Z51.81 Comprehensive metabolic panel     Lipid panel reflex to direct LDL Fasting     CBC with platelets     CK total     UA reflex to Microscopic and Culture     Albumin Random Urine Quantitative with Creat Ratio     TSH with free T4 reflex     Hemoglobin A1c     Comprehensive metabolic panel     Lipid panel reflex to direct LDL  Fasting     CBC with platelets     CK total     UA reflex to Microscopic and Culture     Albumin Random Urine Quantitative with Creat Ratio     TSH with free T4 reflex     Hemoglobin A1c      8. Need for influenza vaccination  Z23 INFLUENZA, QUAD, HIGH DOSE, PF, 65YR + (FLUZONE HD)      9. Need for COVID-19 vaccine  Z23 COVID-19,PF,MODERNA BIVALENT 18+Yrs          Discussed treatment/modality options, including risk and benefits, he desires:    1) labs pending    2) meds refilled    3) immunizations - COVID Bivalent, Influenza    4) CPX in Spring    All diagnosis above reviewed and noted above, otherwise stable.      See [employer] orders for further details.      Return in about 6 months (around 5/7/2023) for Complete Physical, Medication Recheck Visit, Follow Up Chronic.    No LOS data to display    Doing chart review, history and exam, documentation and further activities as noted.           Jalen Daly MD, FAAFP     [facility]  [address] [place] [id number]  [email or web address]   Office: [phone]  Fax: [phone]  Pager: [phone]       Subjective   Ramos is a 77 year old, presenting for the following health issues:    History of Present Illness       Reason for visit:  Med check    He eats 2-3 servings of fruits and vegetables daily.He consumes 0 sweetened beverage(s) daily.He exercises with enough effort to increase his heart rate 9 or less minutes per day.  He exercises with enough effort to increase his heart rate 3 or less days per week.   He is taking medications regularly.     Prediabetes    Lab Results   Component Value Date    A1C 5.8 05/25/2022    A1C 5.6 07/05/2021    A1C 6.0 03/03/2021    A1C 5.9 12/29/2020     Hyperlipidemia Follow-Up      Are you regularly taking any medication or supplement to lower your cholesterol?   Yes- Lipitor    Are you having muscle aches or other side effects that you think  could be caused by your cholesterol lowering medication?  No     Recent Labs   Lab Test 05/25/22  1008 07/05/21  1031   CHOL 203* 209*   HDL 45 48   * 138*   TRIG 134 114     Hypertension Follow-up      Do you check your blood pressure regularly outside of the clinic? Yes     Are you following a low salt diet? Yes    Are your blood pressures ever more than 140 on the top number (systolic) OR more   than 90 on the bottom number (diastolic), for example 140/90? No    BP Readings from Last 3 Encounters:   11/07/22 134/74   07/20/22 (!) 147/86   07/18/22 122/70     Creatinine   Date Value Ref Range Status   06/25/2022 1.48 (H) 0.66 - 1.25 mg/dL Final   07/05/2021 1.56 (H) 0.66 - 1.25 mg/dL Final     GFR Estimate   Date Value Ref Range Status   06/25/2022 49 (L) >60 mL/min/1.73m2 Final     Comment:     Effective December 21, 2021 eGFRcr in adults is calculated using the 2021 CKD-EPI creatinine equation which includes age and gender (Alexei et al., NEJ, DOI: 10.1056/GUWGaw7559721)   07/05/2021 43 (L) >60 mL/min/[1.73_m2] Final     Comment:     Non  GFR Calc  Starting 12/18/2018, serum creatinine based estimated GFR (eGFR) will be   calculated using the Chronic Kidney Disease Epidemiology Collaboration   (CKD-EPI) equation.       OA - stable    Psoriasis - mild flare    Urinary retention - flomax helps significantly    Review of Systems   CONSTITUTIONAL: NEGATIVE for fever, chills, change in weight  INTEGUMENTARY/SKIN: NEGATIVE for worrisome rashes, moles or lesions  EYES: NEGATIVE for vision changes or irritation  ENT/MOUTH: NEGATIVE for ear, mouth and throat problems  RESP: NEGATIVE for significant cough or SOB  CV: NEGATIVE for chest pain, palpitations or peripheral edema  GI: NEGATIVE for nausea, abdominal pain, heartburn, or change in bowel habits  : NEGATIVE for frequency, dysuria, or hematuria  MUSCULOSKELETAL: NEGATIVE for significant arthralgias or myalgia  NEURO: NEGATIVE for weakness,  dizziness or paresthesias  ENDOCRINE: NEGATIVE for temperature intolerance, skin/hair changes  HEME: NEGATIVE for bleeding problems  PSYCHIATRIC: NEGATIVE for changes in mood or affect      Objective    /74   Pulse 98   Temp 97.9  F (36.6  C) (Tympanic)   Resp 16   Ht 1.829 m (6')   Wt 90.3 kg (199 lb 1.6 oz)   SpO2 99%   BMI 27.00 kg/m    Body mass index is 27 kg/m .     Physical Exam   GENERAL: healthy, alert and no distress  EYES: Eyes grossly normal to inspection, PERRL and conjunctivae and sclerae normal  HENT: ear canals and TM's normal, nose and mouth without ulcers or lesions  NECK: no adenopathy, no asymmetry, masses, or scars and thyroid normal to palpation  RESP: lungs clear to auscultation - no rales, rhonchi or wheezes  CV: regular rate and rhythm, normal S1 S2, no S3 or S4, no murmur, click or rub, no peripheral edema and peripheral pulses strong  ABDOMEN: soft, nontender, no hepatosplenomegaly, no masses and bowel sounds normal  MS: no gross musculoskeletal defects noted, no edema  SKIN: no suspicious lesions or rashes  NEURO: Normal strength and tone, mentation intact and speech normal  PSYCH: mentation appears normal, affect normal/bright    Labs reviewed/pending

## 2023-06-11 ASSESSMENT — ENCOUNTER SYMPTOMS
HEARTBURN: 0
FEVER: 0
NAUSEA: 0
CHILLS: 0
DIZZINESS: 0
MYALGIAS: 0
HEADACHES: 0
EYE PAIN: 0
SORE THROAT: 0
ABDOMINAL PAIN: 0
DYSURIA: 0
FREQUENCY: 0
DIARRHEA: 0
JOINT SWELLING: 0
SHORTNESS OF BREATH: 0
ARTHRALGIAS: 0
WEAKNESS: 0
PARESTHESIAS: 0
PALPITATIONS: 0
HEMATOCHEZIA: 0
CONSTIPATION: 0
HEMATURIA: 0
NERVOUS/ANXIOUS: 0
COUGH: 0

## 2023-06-11 ASSESSMENT — ACTIVITIES OF DAILY LIVING (ADL): CURRENT_FUNCTION: NO ASSISTANCE NEEDED

## 2023-06-13 ENCOUNTER — OFFICE VISIT (OUTPATIENT)
Dept: FAMILY MEDICINE | Facility: CLINIC | Age: 78
End: 2023-06-13
Payer: COMMERCIAL

## 2023-06-13 VITALS
WEIGHT: 200 LBS | HEART RATE: 120 BPM | HEIGHT: 72 IN | RESPIRATION RATE: 16 BRPM | SYSTOLIC BLOOD PRESSURE: 110 MMHG | OXYGEN SATURATION: 96 % | TEMPERATURE: 97.7 F | DIASTOLIC BLOOD PRESSURE: 80 MMHG | BODY MASS INDEX: 27.09 KG/M2

## 2023-06-13 DIAGNOSIS — L40.9 PSORIASIS: ICD-10-CM

## 2023-06-13 DIAGNOSIS — Z51.81 MEDICATION MONITORING ENCOUNTER: ICD-10-CM

## 2023-06-13 DIAGNOSIS — E78.5 HYPERLIPIDEMIA LDL GOAL <100: ICD-10-CM

## 2023-06-13 DIAGNOSIS — I10 HYPERTENSION GOAL BP (BLOOD PRESSURE) < 140/90: ICD-10-CM

## 2023-06-13 DIAGNOSIS — Z00.00 MEDICARE ANNUAL WELLNESS VISIT, SUBSEQUENT: ICD-10-CM

## 2023-06-13 DIAGNOSIS — R33.9 URINARY RETENTION: ICD-10-CM

## 2023-06-13 DIAGNOSIS — Z23 NEED FOR PNEUMOCOCCAL VACCINATION: ICD-10-CM

## 2023-06-13 DIAGNOSIS — Z00.00 ROUTINE GENERAL MEDICAL EXAMINATION AT A HEALTH CARE FACILITY: Primary | ICD-10-CM

## 2023-06-13 DIAGNOSIS — Z12.5 SCREENING FOR PROSTATE CANCER: ICD-10-CM

## 2023-06-13 DIAGNOSIS — N18.31 STAGE 3A CHRONIC KIDNEY DISEASE (H): ICD-10-CM

## 2023-06-13 DIAGNOSIS — M15.0 PRIMARY OSTEOARTHRITIS INVOLVING MULTIPLE JOINTS: ICD-10-CM

## 2023-06-13 DIAGNOSIS — R73.03 PREDIABETES: ICD-10-CM

## 2023-06-13 DIAGNOSIS — Z12.11 SCREEN FOR COLON CANCER: ICD-10-CM

## 2023-06-13 PROBLEM — N18.30 CKD (CHRONIC KIDNEY DISEASE) STAGE 3, GFR 30-59 ML/MIN (H): Status: ACTIVE | Noted: 2022-11-08

## 2023-06-13 LAB
ALBUMIN SERPL BCG-MCNC: 4.3 G/DL (ref 3.5–5.2)
ALBUMIN UR-MCNC: NEGATIVE MG/DL
ALP SERPL-CCNC: 53 U/L (ref 40–129)
ALT SERPL W P-5'-P-CCNC: 25 U/L (ref 0–70)
ANION GAP SERPL CALCULATED.3IONS-SCNC: 14 MMOL/L (ref 7–15)
APPEARANCE UR: CLEAR
AST SERPL W P-5'-P-CCNC: 38 U/L (ref 0–45)
BILIRUB SERPL-MCNC: 0.7 MG/DL
BILIRUB UR QL STRIP: NEGATIVE
BUN SERPL-MCNC: 35.2 MG/DL (ref 8–23)
CALCIUM SERPL-MCNC: 10.3 MG/DL (ref 8.8–10.2)
CHLORIDE SERPL-SCNC: 111 MMOL/L (ref 98–107)
CHOLEST SERPL-MCNC: 183 MG/DL
CK SERPL-CCNC: 189 U/L (ref 39–308)
COLOR UR AUTO: YELLOW
CREAT SERPL-MCNC: 1.71 MG/DL (ref 0.67–1.17)
CREAT UR-MCNC: 116 MG/DL
DEPRECATED HCO3 PLAS-SCNC: 25 MMOL/L (ref 22–29)
ERYTHROCYTE [DISTWIDTH] IN BLOOD BY AUTOMATED COUNT: 12.5 % (ref 10–15)
GFR SERPL CREATININE-BSD FRML MDRD: 41 ML/MIN/1.73M2
GLUCOSE SERPL-MCNC: 109 MG/DL (ref 70–99)
GLUCOSE UR STRIP-MCNC: NEGATIVE MG/DL
HBA1C MFR BLD: 5.6 % (ref 0–5.6)
HCT VFR BLD AUTO: 44.8 % (ref 40–53)
HDLC SERPL-MCNC: 39 MG/DL
HGB BLD-MCNC: 14.9 G/DL (ref 13.3–17.7)
HGB UR QL STRIP: NEGATIVE
KETONES UR STRIP-MCNC: NEGATIVE MG/DL
LDLC SERPL CALC-MCNC: 114 MG/DL
LEUKOCYTE ESTERASE UR QL STRIP: NEGATIVE
MCH RBC QN AUTO: 30.6 PG (ref 26.5–33)
MCHC RBC AUTO-ENTMCNC: 33.3 G/DL (ref 31.5–36.5)
MCV RBC AUTO: 92 FL (ref 78–100)
MICROALBUMIN UR-MCNC: <12 MG/L
MICROALBUMIN/CREAT UR: NORMAL MG/G{CREAT}
NITRATE UR QL: NEGATIVE
NONHDLC SERPL-MCNC: 144 MG/DL
PH UR STRIP: 6.5 [PH] (ref 5–7)
PLATELET # BLD AUTO: 250 10E3/UL (ref 150–450)
POTASSIUM SERPL-SCNC: 4.8 MMOL/L (ref 3.4–5.3)
PROT SERPL-MCNC: 7.5 G/DL (ref 6.4–8.3)
PSA SERPL DL<=0.01 NG/ML-MCNC: 3.17 NG/ML (ref 0–6.5)
RBC # BLD AUTO: 4.87 10E6/UL (ref 4.4–5.9)
SODIUM SERPL-SCNC: 150 MMOL/L (ref 136–145)
SP GR UR STRIP: 1.02 (ref 1–1.03)
TRIGL SERPL-MCNC: 148 MG/DL
TSH SERPL DL<=0.005 MIU/L-ACNC: 2.06 UIU/ML (ref 0.3–4.2)
UROBILINOGEN UR STRIP-ACNC: 0.2 E.U./DL
WBC # BLD AUTO: 5.8 10E3/UL (ref 4–11)

## 2023-06-13 PROCEDURE — 82550 ASSAY OF CK (CPK): CPT | Performed by: FAMILY MEDICINE

## 2023-06-13 PROCEDURE — 36415 COLL VENOUS BLD VENIPUNCTURE: CPT | Performed by: FAMILY MEDICINE

## 2023-06-13 PROCEDURE — 81003 URINALYSIS AUTO W/O SCOPE: CPT | Performed by: FAMILY MEDICINE

## 2023-06-13 PROCEDURE — 90677 PCV20 VACCINE IM: CPT | Performed by: FAMILY MEDICINE

## 2023-06-13 PROCEDURE — 85027 COMPLETE CBC AUTOMATED: CPT | Performed by: FAMILY MEDICINE

## 2023-06-13 PROCEDURE — 82570 ASSAY OF URINE CREATININE: CPT | Performed by: FAMILY MEDICINE

## 2023-06-13 PROCEDURE — 84443 ASSAY THYROID STIM HORMONE: CPT | Performed by: FAMILY MEDICINE

## 2023-06-13 PROCEDURE — G0103 PSA SCREENING: HCPCS | Performed by: FAMILY MEDICINE

## 2023-06-13 PROCEDURE — 99214 OFFICE O/P EST MOD 30 MIN: CPT | Mod: 25 | Performed by: FAMILY MEDICINE

## 2023-06-13 PROCEDURE — 82043 UR ALBUMIN QUANTITATIVE: CPT | Performed by: FAMILY MEDICINE

## 2023-06-13 PROCEDURE — 80053 COMPREHEN METABOLIC PANEL: CPT | Performed by: FAMILY MEDICINE

## 2023-06-13 PROCEDURE — 80061 LIPID PANEL: CPT | Performed by: FAMILY MEDICINE

## 2023-06-13 PROCEDURE — 83036 HEMOGLOBIN GLYCOSYLATED A1C: CPT | Performed by: FAMILY MEDICINE

## 2023-06-13 PROCEDURE — G0009 ADMIN PNEUMOCOCCAL VACCINE: HCPCS | Performed by: FAMILY MEDICINE

## 2023-06-13 PROCEDURE — G0439 PPPS, SUBSEQ VISIT: HCPCS | Mod: 25 | Performed by: FAMILY MEDICINE

## 2023-06-13 RX ORDER — TRIAMCINOLONE ACETONIDE 5 MG/G
OINTMENT TOPICAL
Qty: 120 G | Refills: 3 | Status: SHIPPED | OUTPATIENT
Start: 2023-06-13 | End: 2024-06-17

## 2023-06-13 RX ORDER — METOPROLOL SUCCINATE 50 MG/1
50 TABLET, EXTENDED RELEASE ORAL DAILY
Qty: 90 TABLET | Refills: 3 | Status: SHIPPED | OUTPATIENT
Start: 2023-06-13 | End: 2024-06-17

## 2023-06-13 RX ORDER — ATORVASTATIN CALCIUM 80 MG/1
80 TABLET, FILM COATED ORAL DAILY
Qty: 90 TABLET | Refills: 3 | Status: SHIPPED | OUTPATIENT
Start: 2023-06-13 | End: 2024-06-17

## 2023-06-13 RX ORDER — LISINOPRIL AND HYDROCHLOROTHIAZIDE 12.5; 2 MG/1; MG/1
1 TABLET ORAL DAILY
Qty: 90 TABLET | Refills: 3 | Status: SHIPPED | OUTPATIENT
Start: 2023-06-13 | End: 2024-06-17

## 2023-06-13 RX ORDER — FENOFIBRATE 160 MG/1
160 TABLET ORAL DAILY
Qty: 90 TABLET | Refills: 3 | Status: SHIPPED | OUTPATIENT
Start: 2023-06-13 | End: 2024-06-17

## 2023-06-13 RX ORDER — TAMSULOSIN HYDROCHLORIDE 0.4 MG/1
0.4 CAPSULE ORAL DAILY
Qty: 90 CAPSULE | Refills: 3 | Status: SHIPPED | OUTPATIENT
Start: 2023-06-13 | End: 2024-06-17

## 2023-06-13 ASSESSMENT — ENCOUNTER SYMPTOMS
HEMATOCHEZIA: 0
PARESTHESIAS: 0
HEARTBURN: 0
NERVOUS/ANXIOUS: 0
EYE PAIN: 0
COUGH: 0
PALPITATIONS: 0
MYALGIAS: 0
SORE THROAT: 0
CONSTIPATION: 0
FREQUENCY: 0
CHILLS: 0
DIARRHEA: 0
NAUSEA: 0
JOINT SWELLING: 0
SHORTNESS OF BREATH: 0
ABDOMINAL PAIN: 0
DIZZINESS: 0
HEMATURIA: 0
HEADACHES: 0
ARTHRALGIAS: 0
WEAKNESS: 0
DYSURIA: 0
FEVER: 0

## 2023-06-13 ASSESSMENT — ACTIVITIES OF DAILY LIVING (ADL): CURRENT_FUNCTION: NO ASSISTANCE NEEDED

## 2023-06-13 NOTE — PROGRESS NOTES
"[facility] Aleksandar Beasley is a 78 year old who presents for Preventive Visit.      6/13/2023     8:52 AM   Additional Questions   Roomed by Grace     Are you in the first 12 months of your Medicare coverage?  No    Healthy Habits:     In general, how would you rate your overall health?  Excellent    Frequency of exercise:  1 day/week    Do you usually eat at least 4 servings of fruit and vegetables a day, include whole grains    & fiber and avoid regularly eating high fat or \"junk\" foods?  Yes    Taking medications regularly:  Yes    Medication side effects:  Not applicable    Ability to successfully perform activities of daily living:  No assistance needed    Home Safety:  No safety concerns identified    Hearing Impairment:  No hearing concerns    In the past 6 months, have you been bothered by leaking of urine?  No    In general, how would you rate your overall mental or emotional health?  Excellent      PHQ-2 Total Score: 0    Additional concerns today:  No    Have you ever done Advance Care Planning? (For example, a Health Directive, POLST, or a discussion with a medical provider or your loved ones about your wishes): Yes, advance care planning is on file.      Fall risk  Fallen 2 or more times in the past year?: No  Any fall with injury in the past year?: No    Cognitive Screening   1) Repeat 3 items (Leader, Season, Table)    2) Clock draw: NORMAL  3) 3 item recall: Recalls 3 objects  Results: 3 items recalled: COGNITIVE IMPAIRMENT LESS LIKELY    Mini-CogTM Copyright S Yuri. Licensed by the author for use in [facility]; reprinted with permission ([email or web address]@.[email or web address]). All rights reserved.      Do you have sleep apnea, excessive snoring or daytime drowsiness?: no    Reviewed and updated as needed this visit by clinical staff   Tobacco  Allergies  Meds  Problems  Med Hx  Surg Hx  Fam Hx          Reviewed and updated as needed this visit by Provider                 Social " History     Tobacco Use     Smoking status: Never     Smokeless tobacco: Never   Vaping Use     Vaping status: Never Used   Substance Use Topics     Alcohol use: Not Currently             6/11/2023     7:55 AM   Alcohol Use   Prescreen: >3 drinks/day or >7 drinks/week? Not Applicable     Do you have a current opioid prescription? No  Do you use any other controlled substances or medications that are not prescribed by a provider? None      Prediabetes    Lab Results   Component Value Date    A1C 5.6 06/13/2023    A1C 5.5 11/07/2022    A1C 5.8 05/25/2022    A1C 5.6 07/05/2021    A1C 6.0 03/03/2021    A1C 5.9 12/29/2020     Hyperlipidemia Follow-Up      Are you regularly taking any medication or supplement to lower your cholesterol?   Yes- Lipitor    Are you having muscle aches or other side effects that you think could be caused by your cholesterol lowering medication?  No     Recent Labs   Lab Test 11/07/22  1052 05/25/22  1008   CHOL 175 203*   HDL 46 45   * 131*   TRIG 142 134     CKD 3 A / Hypertension Follow-up      Do you check your blood pressure regularly outside of the clinic? Yes     Are you following a low salt diet? Yes    Are your blood pressures ever more than 140 on the top number (systolic) OR more   than 90 on the bottom number (diastolic), for example 140/90? No    BP Readings from Last 3 Encounters:   06/13/23 110/80   11/07/22 134/74   07/20/22 (!) 147/86     Creatinine   Date Value Ref Range Status   11/07/2022 1.39 (H) 0.66 - 1.25 mg/dL Final   07/05/2021 1.56 (H) 0.66 - 1.25 mg/dL Final     GFR Estimate   Date Value Ref Range Status   11/07/2022 53 (L) >60 mL/min/1.73m2 Final     Comment:     Effective December 21, 2021 eGFRcr in adults is calculated using the 2021 CKD-EPI creatinine equation which includes age and gender (Alexei patricio al., NEJM, DOI: 10.1056/MZMHcj7341766)   07/05/2021 43 (L) >60 mL/min/[1.73_m2] Final     Comment:     Non  GFR Calc  Starting 12/18/2018,  serum creatinine based estimated GFR (eGFR) will be   calculated using the Chronic Kidney Disease Epidemiology Collaboration   (CKD-EPI) equation.       Psoriasis - stable - controlled    OA - no issues    Current providers sharing in care for this patient include:   Patient Care Team:  Jalen Daly MD as PCP - General (Family Medicine)  Jalen Daly MD as Assigned PCP  Angelic Reeves PA-C as Assigned Surgical Provider    The following health maintenance items are reviewed in Epic and correct as of today:  Health Maintenance   Topic Date Due     ZOSTER IMMUNIZATION (1 of 2) Never done     COVID-19 Vaccine (6 - Moderna series) 03/07/2023     BMP  11/07/2023     LIPID  11/07/2023     MICROALBUMIN  11/07/2023     MEDICARE ANNUAL WELLNESS VISIT  06/13/2024     ANNUAL REVIEW OF HM ORDERS  06/13/2024     FALL RISK ASSESSMENT  06/13/2024     HEMOGLOBIN  06/13/2024     ADVANCE CARE PLANNING  06/13/2028     DTAP/TDAP/TD IMMUNIZATION (2 - Td or Tdap) 06/13/2032     HEPATITIS C SCREENING  Completed     PHQ-2 (once per calendar year)  Completed     INFLUENZA VACCINE  Completed     Pneumococcal Vaccine: 65+ Years  Completed     URINALYSIS  Completed     IPV IMMUNIZATION  Aged Out     MENINGITIS IMMUNIZATION  Aged Out     COLORECTAL CANCER SCREENING  Discontinued     Review of Systems   Constitutional: Negative for chills and fever.   HENT: Negative for congestion, ear pain, hearing loss and sore throat.    Eyes: Negative for pain and visual disturbance.   Respiratory: Negative for cough and shortness of breath.    Cardiovascular: Negative for chest pain, palpitations and peripheral edema.   Gastrointestinal: Negative for abdominal pain, constipation, diarrhea, heartburn, hematochezia and nausea.   Genitourinary: Negative for dysuria, frequency, genital sores, hematuria, impotence, penile discharge and urgency.   Musculoskeletal: Negative for arthralgias, joint swelling and myalgias.   Skin: Negative for rash.    Neurological: Negative for dizziness, weakness, headaches and paresthesias.   Psychiatric/Behavioral: Negative for mood changes. The patient is not nervous/anxious.    Health Maintenance     Colonoscopy:  declines   FIT:  given              PSA:  desires   DEXA:  NA    Health Maintenance Due   Topic Date Due     ZOSTER IMMUNIZATION (1 of 2) Never done     COVID-19 Vaccine (6 - Moderna series) 03/07/2023       Current Problem List    Patient Active Problem List   Diagnosis     Hypertension goal BP (blood pressure) < 140/90     Psoriasis     Hyperlipidemia LDL goal <100     Prediabetes     Primary osteoarthritis involving multiple joints     Urinary retention     Hyponatremia     CKD (chronic kidney disease) stage 3, GFR 30-59 ml/min (H)       Past Medical History    Past Medical History:   Diagnosis Date     CKD (chronic kidney disease) stage 3, GFR 30-59 ml/min (H)      Hyperlipidemia LDL goal <100      Hypertension goal BP (blood pressure) < 140/90      Prediabetes 01/01/2021     Primary osteoarthritis involving multiple joints      Psoriasis      Urinary retention 06/2022    post op       Past Surgical History    Past Surgical History:   Procedure Laterality Date     CYSTOSCOPY  07/2022    urinary retention     DAVINCI XI HERNIORRHAPHY INGUINAL Right 06/24/2022    Procedure: Xi Robotic Assisted HERNIORRHAPHY, INGUINAL, LAPAROSCOPIC, WITH MESH - Right;  Surgeon: Charles Elliott MD;  Location: [facility] OR     LAPAROSCOPIC APPENDECTOMY N/A 07/20/2022    Procedure: Laparoscopic appendectomy;  Surgeon: Charles Elliott MD;  Location: [facility] OR     SURGICAL HISTORY OF -  Right 1982    right inguinal hernia     SURGICAL HISTORY OF -   1985    cholecystectomy     SURGICAL HISTORY OF -  Left 1964    left testicle removed secondary to injury     SURGICAL HISTORY OF -  Left 01/2021    Left eye - Dr Lees - Macular Hole and Epiretinal Membrane     SURGICAL HISTORY OF -  Left 07/2021    Left IOL - Dr Becerra     SURGICAL  HISTORY OF -   07/2022    laproscopic appendectomy - mucocele appendix     VASECTOMY Bilateral 1993       Current Medications    Current Outpatient Medications   Medication Sig Dispense Refill     atorvastatin (LIPITOR) 80 MG tablet Take 1 tablet (80 mg) by mouth daily 90 tablet 3     cholecalciferol (VITAMIN D3) 25 mcg (1000 units) capsule Take 1 capsule by mouth daily       fenofibrate (TRIGLIDE/LOFIBRA) 160 MG tablet Take 1 tablet (160 mg) by mouth daily 90 tablet 3     glucosamine-chondroitin 500-400 MG CAPS per capsule Take 1 capsule by mouth daily       lisinopril-hydrochlorothiazide (ZESTORETIC) 20-12.5 MG tablet Take 1 tablet by mouth daily 90 tablet 3     melatonin 5 MG tablet Take 5 mg by mouth nightly as needed for sleep       metoprolol succinate ER (TOPROL XL) 50 MG 24 hr tablet Take 1 tablet (50 mg) by mouth daily 90 tablet 3     tamsulosin (FLOMAX) 0.4 MG capsule Take 1 capsule (0.4 mg) by mouth daily 90 capsule 3     triamcinolone (KENALOG) 0.5 % external ointment Apply to psoriasis twice daily 120 g 3       Allergies    Allergies   Allergen Reactions     Codeine Other (See Comments)     Tachycardia     Demerol [Meperidine] Rash       Immunizations    Immunization History   Administered Date(s) Administered     COVID-19 Bivalent 18+ (Moderna) 11/07/2022     COVID-19 Monovalent 18+ (Moderna) 03/05/2021, 04/02/2021, 10/29/2021, 04/28/2022     Influenza Vaccine 65+ (Fluzone HD) 10/29/2021, 11/07/2022     Pneumococcal 20 valent Conjugate (Prevnar 20) 06/13/2023     TDAP (Adacel,Boostrix) 06/13/2022       Family History    Family History   Problem Relation Age of Onset     Hypertension Mother      Parkinsonism Father      Parkinsonism Brother      Dementia Maternal Grandfather      Cancer Brother         eye cancer, blastomycosis     Cerebrovascular Disease Brother         bike accident in MVA     Diabetes Paternal Grandfather        Social History    Social History     Socioeconomic History     Marital  status:      Spouse name: Claritza     Number of children: 5     Years of education: 12     Highest education level: Not on file   Occupational History     Not on file   Tobacco Use     Smoking status: Never     Smokeless tobacco: Never   Vaping Use     Vaping status: Never Used   Substance and Sexual Activity     Alcohol use: Not Currently     Drug use: Never     Sexual activity: Not Currently     Partners: Female     Birth control/protection: None   Other Topics Concern     Parent/sibling w/ CABG, MI or angioplasty before 65F 55M? No   Social History Narrative     Not on file     Social Determinants of Health     Financial Resource Strain: Not on file   Food Insecurity: Not on file   Transportation Needs: Not on file   Physical Activity: Not on file   Stress: Not on file   Social Connections: Not on file   Intimate Partner Violence: Not on file   Housing Stability: Not on file       ROS    CONSTITUTIONAL: NEGATIVE for fever, chills, change in weight  INTEGUMENTARY/SKIN: NEGATIVE for worrisome rashes, moles or lesions  EYES: NEGATIVE for vision changes or irritation  ENT/MOUTH: NEGATIVE for ear, mouth and throat problems  RESP: NEGATIVE for significant cough or SOB  BREAST: NEGATIVE for masses, tenderness or discharge  CV: NEGATIVE for chest pain, palpitations or peripheral edema  GI: NEGATIVE for nausea, abdominal pain, heartburn, or change in bowel habits  : NEGATIVE for frequency, dysuria, or hematuria  MUSCULOSKELETAL: NEGATIVE for significant arthralgias or myalgia  NEURO: NEGATIVE for weakness, dizziness or paresthesias  ENDOCRINE: NEGATIVE for temperature intolerance, skin/hair changes  HEME: NEGATIVE for bleeding problems  PSYCHIATRIC: NEGATIVE for changes in mood or affect    OBJECTIVE    /80   Pulse 120   Temp 97.7  F (36.5  C) (Tympanic)   Resp 16   Ht 1.829 m (6')   Wt 90.7 kg (200 lb)   SpO2 96%   BMI 27.12 kg/m      EXAM:    GENERAL: healthy, alert and no distress  EYES: Eyes grossly  normal to inspection, PERRL and conjunctivae and sclerae normal  HENT: ear canals and TM's normal, nose and mouth without ulcers or lesions  NECK: no adenopathy, no asymmetry, masses, or scars and thyroid normal to palpation  RESP: lungs clear to auscultation - no rales, rhonchi or wheezes  CV: regular rate and rhythm, normal S1 S2, no S3 or S4, no murmur, click or rub, no peripheral edema and peripheral pulses strong  ABDOMEN: soft, nontender, no hepatosplenomegaly, no masses and bowel sounds normal   (male): pt declines  RECTAL: pt declines  MS: no gross musculoskeletal defects noted, no edema  SKIN: no suspicious lesions or rashes  NEURO: Normal strength and tone, mentation intact and speech normal  PSYCH: mentation appears normal, affect normal/bright  LYMPH: no cervical, supraclavicular, axillary, or inguinal adenopathy    DIAGNOSTICS/PROCEDURES    Pending    ASSESSMENT      ICD-10-CM    1. Routine general medical examination at a health care facility  Z00.00 Comprehensive metabolic panel     Lipid panel reflex to direct LDL Fasting     CBC with platelets     CK total     UA Macroscopic with reflex to Microscopic and Culture     Albumin Random Urine Quantitative with Creat Ratio     TSH with free T4 reflex     Prostate Specific Antigen Screen     Fecal colorectal cancer screen FIT     Hemoglobin A1c     REVIEW OF HEALTH MAINTENANCE PROTOCOL ORDERS     Comprehensive metabolic panel     Lipid panel reflex to direct LDL Fasting     CBC with platelets     CK total     UA Macroscopic with reflex to Microscopic and Culture     Albumin Random Urine Quantitative with Creat Ratio     TSH with free T4 reflex     Prostate Specific Antigen Screen     Fecal colorectal cancer screen FIT     Hemoglobin A1c     PRIMARY CARE FOLLOW-UP SCHEDULING      2. Medicare annual wellness visit, subsequent  Z00.00 Comprehensive metabolic panel     Lipid panel reflex to direct LDL Fasting     CBC with platelets     CK total     UA  Macroscopic with reflex to Microscopic and Culture     Albumin Random Urine Quantitative with Creat Ratio     TSH with free T4 reflex     Prostate Specific Antigen Screen     Fecal colorectal cancer screen FIT     Hemoglobin A1c     REVIEW OF HEALTH MAINTENANCE PROTOCOL ORDERS     Comprehensive metabolic panel     Lipid panel reflex to direct LDL Fasting     CBC with platelets     CK total     UA Macroscopic with reflex to Microscopic and Culture     Albumin Random Urine Quantitative with Creat Ratio     TSH with free T4 reflex     Prostate Specific Antigen Screen     Fecal colorectal cancer screen FIT     Hemoglobin A1c     PRIMARY CARE FOLLOW-UP SCHEDULING      3. Prediabetes  R73.03 Comprehensive metabolic panel     Lipid panel reflex to direct LDL Fasting     UA Macroscopic with reflex to Microscopic and Culture     Albumin Random Urine Quantitative with Creat Ratio     TSH with free T4 reflex     Hemoglobin A1c     REVIEW OF HEALTH MAINTENANCE PROTOCOL ORDERS     Comprehensive metabolic panel     Lipid panel reflex to direct LDL Fasting     UA Macroscopic with reflex to Microscopic and Culture     Albumin Random Urine Quantitative with Creat Ratio     TSH with free T4 reflex     Hemoglobin A1c     PRIMARY CARE FOLLOW-UP SCHEDULING     atorvastatin (LIPITOR) 80 MG tablet     fenofibrate (TRIGLIDE/LOFIBRA) 160 MG tablet     lisinopril-hydrochlorothiazide (ZESTORETIC) 20-12.5 MG tablet     OFFICE/OUTPT VISIT,EST,LEVL IV      4. Stage 3a chronic kidney disease (H)  N18.31 Comprehensive metabolic panel     CBC with platelets     UA Macroscopic with reflex to Microscopic and Culture     Albumin Random Urine Quantitative with Creat Ratio     TSH with free T4 reflex     Hemoglobin A1c     REVIEW OF HEALTH MAINTENANCE PROTOCOL ORDERS     Comprehensive metabolic panel     CBC with platelets     UA Macroscopic with reflex to Microscopic and Culture     Albumin Random Urine Quantitative with Creat Ratio     TSH with free  T4 reflex     Hemoglobin A1c     PRIMARY CARE FOLLOW-UP SCHEDULING     OFFICE/OUTPT VISIT,EST,LEVL IV      5. Hypertension goal BP (blood pressure) < 140/90  I10 Comprehensive metabolic panel     UA Macroscopic with reflex to Microscopic and Culture     Albumin Random Urine Quantitative with Creat Ratio     TSH with free T4 reflex     REVIEW OF HEALTH MAINTENANCE PROTOCOL ORDERS     Comprehensive metabolic panel     UA Macroscopic with reflex to Microscopic and Culture     Albumin Random Urine Quantitative with Creat Ratio     TSH with free T4 reflex     PRIMARY CARE FOLLOW-UP SCHEDULING     lisinopril-hydrochlorothiazide (ZESTORETIC) 20-12.5 MG tablet     metoprolol succinate ER (TOPROL XL) 50 MG 24 hr tablet     OFFICE/OUTPT VISIT,EST,LEVL IV      6. Hyperlipidemia LDL goal <100  E78.5 Comprehensive metabolic panel     Lipid panel reflex to direct LDL Fasting     CK total     TSH with free T4 reflex     REVIEW OF HEALTH MAINTENANCE PROTOCOL ORDERS     Comprehensive metabolic panel     Lipid panel reflex to direct LDL Fasting     CK total     TSH with free T4 reflex     PRIMARY CARE FOLLOW-UP SCHEDULING     atorvastatin (LIPITOR) 80 MG tablet     fenofibrate (TRIGLIDE/LOFIBRA) 160 MG tablet     OFFICE/OUTPT VISIT,EST,LEVL IV      7. Psoriasis  L40.9 REVIEW OF HEALTH MAINTENANCE PROTOCOL ORDERS     PRIMARY CARE FOLLOW-UP SCHEDULING     triamcinolone (KENALOG) 0.5 % external ointment     OFFICE/OUTPT VISIT,EST,LEVL IV      8. Primary osteoarthritis involving multiple joints  M15.9 REVIEW OF HEALTH MAINTENANCE PROTOCOL ORDERS     PRIMARY CARE FOLLOW-UP SCHEDULING     OFFICE/OUTPT VISIT,EST,LEVL IV      9. Urinary retention  R33.9 tamsulosin (FLOMAX) 0.4 MG capsule     OFFICE/OUTPT VISIT,EST,LEVL IV      10. Screen for colon cancer  Z12.11 Fecal colorectal cancer screen FIT     REVIEW OF HEALTH MAINTENANCE PROTOCOL ORDERS     Fecal colorectal cancer screen FIT     PRIMARY CARE FOLLOW-UP SCHEDULING     OFFICE/OUTPT  VISIT,EST,LEVL IV      11. Screening for prostate cancer  Z12.5 Prostate Specific Antigen Screen     REVIEW OF HEALTH MAINTENANCE PROTOCOL ORDERS     Prostate Specific Antigen Screen     PRIMARY CARE FOLLOW-UP SCHEDULING     OFFICE/OUTPT VISIT,EST,LEVL IV      12. Medication monitoring encounter  Z51.81 Comprehensive metabolic panel     Lipid panel reflex to direct LDL Fasting     CBC with platelets     CK total     UA Macroscopic with reflex to Microscopic and Culture     Albumin Random Urine Quantitative with Creat Ratio     TSH with free T4 reflex     Hemoglobin A1c     REVIEW OF HEALTH MAINTENANCE PROTOCOL ORDERS     Comprehensive metabolic panel     Lipid panel reflex to direct LDL Fasting     CBC with platelets     CK total     UA Macroscopic with reflex to Microscopic and Culture     Albumin Random Urine Quantitative with Creat Ratio     TSH with free T4 reflex     Hemoglobin A1c     PRIMARY CARE FOLLOW-UP SCHEDULING     OFFICE/OUTPT VISIT,EST,LEVL IV      13. Need for pneumococcal vaccination  Z23 PNEUMOCOCCAL 20 VALENT CONJUGATE (PREVNAR 20)     OFFICE/OUTPT VISIT,EST,LEVL IV          PLAN    Discussed treatment/modality options, including risk and benefits, he desires:    advised alcohol consumption 1oz per day or less, advised 1 multivitamin per day, advised calcium 4695-9873 mg/d and Vitamin D 800-1200 IU/d, advised dentist every 6 months, advised diet and exercise, advised opthalmologist every 1-2 years, advised self testicular exam q month, further health care maintenance, further lab(s), immunization(s), medication refill(s) and observation    Discussed controversies surrounding PSA. Specifically reviewed 2017 USPSTF findings recommending discussion of PSA testing for men ages 55-69.  Reviewed findings of the  Randomized Study of Screening for Prostate Cancer which showed a 30% reduction in advanced stage prostate cancer and a 20% reduction in death rate from prostate cancer in this age group.  PSA-based screening may prevent up to 2 deaths and up to 3 cases of metastatic disease per 1,000 men screened over 13 years.    We've elected to do PSA this year after discussing these controversies.    All diagnosis above reviewed and noted above, otherwise stable.      See [facility] orders for further details.      1) med refills    2) labs pending    3) immunizations reviewed - desires prevnar 20, offered COVID bivalent, Twinrix, shingrix    4) declines colonoscopy    5) ophthalmology    Return in about 1 year (around 6/13/2024) for Complete Physical, Medication Recheck Visit, Follow Up Chronic.    Health Maintenance Due   Topic Date Due     ZOSTER IMMUNIZATION (1 of 2) Never done     COVID-19 Vaccine (6 - Moderna series) 03/07/2023       COUNSELING    Reviewed preventive health counseling, as reflected in patient instructions    BP Readings from Last 1 Encounters:   06/13/23 110/80     Estimated body mass index is 27.12 kg/m  as calculated from the following:    Height as of this encounter: 1.829 m (6').    Weight as of this encounter: 90.7 kg (200 lb).           reports that he has never smoked. He has never used smokeless tobacco.      Counseling Resources:    ATP IV Guidelines  Pooled Cohorts Equation Calculator  FRAX Risk Assessment  ICSI Preventive Guidelines  Dietary Guidelines for Americans, 2010  USDA's MyPlate  ASA Prophylaxis  Lung CA Screening           Jalen Daly MD, FAAFP     [facility]  [address] [place] [id number]  [email or web address]   Office: [phone]  Fax: [phone]  Pager: [phone]     Identified Health Risks:      I have reviewed Opioid Use Disorder and Substance Use Disorder risk factors and made any needed referrals.

## 2023-06-20 PROCEDURE — 82274 ASSAY TEST FOR BLOOD FECAL: CPT | Performed by: FAMILY MEDICINE

## 2023-06-23 LAB — HEMOCCULT STL QL IA: NEGATIVE

## 2023-06-24 DIAGNOSIS — E83.52 HYPERCALCEMIA: ICD-10-CM

## 2023-06-24 DIAGNOSIS — N28.9 DECREASED RENAL FUNCTION: ICD-10-CM

## 2023-06-24 DIAGNOSIS — E87.0 HYPERNATREMIA: Primary | ICD-10-CM

## 2023-07-12 ENCOUNTER — LAB (OUTPATIENT)
Dept: LAB | Facility: CLINIC | Age: 78
End: 2023-07-12
Payer: COMMERCIAL

## 2023-07-12 DIAGNOSIS — N28.9 DECREASED RENAL FUNCTION: ICD-10-CM

## 2023-07-12 DIAGNOSIS — N18.32 STAGE 3B CHRONIC KIDNEY DISEASE (H): Primary | ICD-10-CM

## 2023-07-12 DIAGNOSIS — E83.52 HYPERCALCEMIA: ICD-10-CM

## 2023-07-12 DIAGNOSIS — E87.0 HYPERNATREMIA: ICD-10-CM

## 2023-07-12 DIAGNOSIS — R73.03 PREDIABETES: ICD-10-CM

## 2023-07-12 LAB
ANION GAP SERPL CALCULATED.3IONS-SCNC: 9 MMOL/L (ref 7–15)
BUN SERPL-MCNC: 45.7 MG/DL (ref 8–23)
CALCIUM SERPL-MCNC: 10.2 MG/DL (ref 8.8–10.2)
CHLORIDE SERPL-SCNC: 104 MMOL/L (ref 98–107)
CREAT SERPL-MCNC: 1.83 MG/DL (ref 0.67–1.17)
DEPRECATED HCO3 PLAS-SCNC: 25 MMOL/L (ref 22–29)
GFR SERPL CREATININE-BSD FRML MDRD: 38 ML/MIN/1.73M2
GLUCOSE SERPL-MCNC: 113 MG/DL (ref 70–99)
POTASSIUM SERPL-SCNC: 4.5 MMOL/L (ref 3.4–5.3)
SODIUM SERPL-SCNC: 138 MMOL/L (ref 136–145)

## 2023-07-12 PROCEDURE — 80048 BASIC METABOLIC PNL TOTAL CA: CPT

## 2023-07-12 PROCEDURE — 36415 COLL VENOUS BLD VENIPUNCTURE: CPT

## 2023-07-13 ENCOUNTER — TELEPHONE (OUTPATIENT)
Dept: NEPHROLOGY | Facility: CLINIC | Age: 78
End: 2023-07-13
Payer: COMMERCIAL

## 2023-07-13 NOTE — TELEPHONE ENCOUNTER
[facility] Call Center    Phone Message    May a detailed message be left on voicemail: yes     Reason for Call: Order(s): Other:   Reason for requested: Labs  Date needed: 9/13/23   Provider name: Dr. Candelario      Action Taken: Message routed to:  [facility]    Travel Screening: Not Applicable

## 2023-07-13 NOTE — CONFIDENTIAL NOTE
DIAGNOSIS:    Stage 3b chronic kidney disease (H)   Prediabetes      DATE RECEIVED: 09.13.2023   NOTES STATUS DETAILS   OFFICE NOTE from referring provider Internal 07.12.2023 Jalen Daly MD   OFFICE NOTE from other specialist      *Only VASCULITIS or LUPUS gather office notes for the following     *PULMONARY       *ENT     *DERMATOLOGY     *RHEUMATOLOGY     DISCHARGE SUMMARY from hospital     DISCHARGE REPORT from the ER     MEDICATION LIST Internal    IMAGING  (NEED IMAGES AND REPORTS)     KIDNEY CT SCAN     KIDNEY ULTRASOUND     MR ABDOMEN     NUCLEAR MEDICINE RENAL     LABS     CBC Internal 06.13.2023   CMP Internal 06.13.2023   BMP Internal 07.12.2023   UA Internal 06.13.2023   URINE PROTEIN Internal 06.13.2023   RENAL PANEL     BIOPSY     KIDNEY BIOPSY

## 2023-08-29 DIAGNOSIS — N18.31 STAGE 3A CHRONIC KIDNEY DISEASE (H): Primary | ICD-10-CM

## 2023-09-13 ENCOUNTER — OFFICE VISIT (OUTPATIENT)
Dept: NEPHROLOGY | Facility: CLINIC | Age: 78
End: 2023-09-13
Attending: INTERNAL MEDICINE
Payer: COMMERCIAL

## 2023-09-13 ENCOUNTER — LAB (OUTPATIENT)
Dept: LAB | Facility: CLINIC | Age: 78
End: 2023-09-13
Attending: INTERNAL MEDICINE
Payer: COMMERCIAL

## 2023-09-13 ENCOUNTER — PRE VISIT (OUTPATIENT)
Dept: NEPHROLOGY | Facility: CLINIC | Age: 78
End: 2023-09-13

## 2023-09-13 VITALS
BODY MASS INDEX: 25.02 KG/M2 | SYSTOLIC BLOOD PRESSURE: 133 MMHG | HEART RATE: 125 BPM | WEIGHT: 184.7 LBS | DIASTOLIC BLOOD PRESSURE: 83 MMHG | HEIGHT: 72 IN

## 2023-09-13 DIAGNOSIS — N17.9 AKI (ACUTE KIDNEY INJURY) (H): ICD-10-CM

## 2023-09-13 DIAGNOSIS — R73.03 PREDIABETES: ICD-10-CM

## 2023-09-13 DIAGNOSIS — N18.31 STAGE 3A CHRONIC KIDNEY DISEASE (H): ICD-10-CM

## 2023-09-13 DIAGNOSIS — N18.32 STAGE 3B CHRONIC KIDNEY DISEASE (H): ICD-10-CM

## 2023-09-13 DIAGNOSIS — N17.9 AKI (ACUTE KIDNEY INJURY) (H): Primary | ICD-10-CM

## 2023-09-13 LAB
ALBUMIN MFR UR ELPH: <6 MG/DL
ALBUMIN SERPL BCG-MCNC: 4.3 G/DL (ref 3.5–5.2)
ALBUMIN UR-MCNC: NEGATIVE MG/DL
ANION GAP SERPL CALCULATED.3IONS-SCNC: 10 MMOL/L (ref 7–15)
APPEARANCE UR: CLEAR
BILIRUB UR QL STRIP: NEGATIVE
BUN SERPL-MCNC: 44.5 MG/DL (ref 8–23)
CALCIUM SERPL-MCNC: 10 MG/DL (ref 8.8–10.2)
CHLORIDE SERPL-SCNC: 100 MMOL/L (ref 98–107)
COLOR UR AUTO: ABNORMAL
CREAT SERPL-MCNC: 1.93 MG/DL (ref 0.67–1.17)
CREAT UR-MCNC: 74.6 MG/DL
CREAT UR-MCNC: 76.1 MG/DL
DEPRECATED CALCIDIOL+CALCIFEROL SERPL-MC: 40 UG/L (ref 20–75)
DEPRECATED HCO3 PLAS-SCNC: 25 MMOL/L (ref 22–29)
EGFRCR SERPLBLD CKD-EPI 2021: 35 ML/MIN/1.73M2
ERYTHROCYTE [DISTWIDTH] IN BLOOD BY AUTOMATED COUNT: 13.1 % (ref 10–15)
GLUCOSE SERPL-MCNC: 157 MG/DL (ref 70–99)
GLUCOSE UR STRIP-MCNC: NEGATIVE MG/DL
HCT VFR BLD AUTO: 42.4 % (ref 40–53)
HGB BLD-MCNC: 14.2 G/DL (ref 13.3–17.7)
HGB UR QL STRIP: NEGATIVE
HYALINE CASTS: 1 /LPF
KETONES UR STRIP-MCNC: NEGATIVE MG/DL
LEUKOCYTE ESTERASE UR QL STRIP: NEGATIVE
MCH RBC QN AUTO: 30.6 PG (ref 26.5–33)
MCHC RBC AUTO-ENTMCNC: 33.5 G/DL (ref 31.5–36.5)
MCV RBC AUTO: 91 FL (ref 78–100)
MICROALBUMIN UR-MCNC: <12 MG/L
MICROALBUMIN/CREAT UR: NORMAL MG/G{CREAT}
MUCOUS THREADS #/AREA URNS LPF: PRESENT /LPF
NITRATE UR QL: NEGATIVE
PH UR STRIP: 6 [PH] (ref 5–7)
PHOSPHATE SERPL-MCNC: 2.4 MG/DL (ref 2.5–4.5)
PLATELET # BLD AUTO: 264 10E3/UL (ref 150–450)
POTASSIUM SERPL-SCNC: 4.7 MMOL/L (ref 3.4–5.3)
PROT/CREAT 24H UR: NORMAL MG/G{CREAT}
PTH-INTACT SERPL-MCNC: 40 PG/ML (ref 15–65)
RBC # BLD AUTO: 4.64 10E6/UL (ref 4.4–5.9)
RBC URINE: <1 /HPF
SODIUM SERPL-SCNC: 135 MMOL/L (ref 136–145)
SP GR UR STRIP: 1.01 (ref 1–1.03)
UROBILINOGEN UR STRIP-MCNC: NORMAL MG/DL
WBC # BLD AUTO: 7.8 10E3/UL (ref 4–11)
WBC URINE: 0 /HPF

## 2023-09-13 PROCEDURE — 82306 VITAMIN D 25 HYDROXY: CPT | Performed by: INTERNAL MEDICINE

## 2023-09-13 PROCEDURE — 82570 ASSAY OF URINE CREATININE: CPT | Performed by: INTERNAL MEDICINE

## 2023-09-13 PROCEDURE — 84155 ASSAY OF PROTEIN SERUM: CPT | Performed by: PATHOLOGY

## 2023-09-13 PROCEDURE — 99000 SPECIMEN HANDLING OFFICE-LAB: CPT | Performed by: PATHOLOGY

## 2023-09-13 PROCEDURE — 86334 IMMUNOFIX E-PHORESIS SERUM: CPT | Performed by: PATHOLOGY

## 2023-09-13 PROCEDURE — 83970 ASSAY OF PARATHORMONE: CPT | Performed by: PATHOLOGY

## 2023-09-13 PROCEDURE — 36415 COLL VENOUS BLD VENIPUNCTURE: CPT | Performed by: PATHOLOGY

## 2023-09-13 PROCEDURE — 83521 IG LIGHT CHAINS FREE EACH: CPT | Mod: 59 | Performed by: PATHOLOGY

## 2023-09-13 PROCEDURE — 99205 OFFICE O/P NEW HI 60 MIN: CPT | Performed by: INTERNAL MEDICINE

## 2023-09-13 PROCEDURE — 84165 PROTEIN E-PHORESIS SERUM: CPT | Mod: 26

## 2023-09-13 PROCEDURE — G0463 HOSPITAL OUTPT CLINIC VISIT: HCPCS | Performed by: INTERNAL MEDICINE

## 2023-09-13 PROCEDURE — 82610 CYSTATIN C: CPT | Performed by: INTERNAL MEDICINE

## 2023-09-13 PROCEDURE — 85027 COMPLETE CBC AUTOMATED: CPT | Performed by: PATHOLOGY

## 2023-09-13 PROCEDURE — 82550 ASSAY OF CK (CPK): CPT | Performed by: PATHOLOGY

## 2023-09-13 PROCEDURE — 81001 URINALYSIS AUTO W/SCOPE: CPT | Performed by: PATHOLOGY

## 2023-09-13 PROCEDURE — 84156 ASSAY OF PROTEIN URINE: CPT | Performed by: PATHOLOGY

## 2023-09-13 PROCEDURE — 86334 IMMUNOFIX E-PHORESIS SERUM: CPT | Mod: 26

## 2023-09-13 PROCEDURE — 80069 RENAL FUNCTION PANEL: CPT | Performed by: PATHOLOGY

## 2023-09-13 PROCEDURE — 84165 PROTEIN E-PHORESIS SERUM: CPT | Mod: TC | Performed by: PATHOLOGY

## 2023-09-13 RX ORDER — MULTIPLE VITAMINS W/ MINERALS TAB 9MG-400MCG
1 TAB ORAL DAILY
COMMUNITY

## 2023-09-13 ASSESSMENT — PAIN SCALES - GENERAL: PAINLEVEL: NO PAIN (0)

## 2023-09-13 NOTE — NURSING NOTE
Chief Complaint   Patient presents with    Consult     Establish care with CKD     /83   Pulse (!) 125   Ht 1.829 m (6')   Wt 83.8 kg (184 lb 11.2 oz)   BMI 25.05 kg/m    Sol Chacon CMA on 9/13/2023 at 12:40 PM

## 2023-09-13 NOTE — PATIENT INSTRUCTIONS
It was a pleasure taking care of you today.  I've included a brief summary of our discussion and care plan from today's visit below.  Please review this information with your primary care provider.     My recommendations are summarized as follows:  -Will do a kidney imaging to look for any obstruction, stones...  -Will repeat kidney labs in 3 weeks (oct 4).    Who do I call with any questions after my visit?  Please be in touch if there are any further questions that arise following today's visit.  There are multiple ways to contact your nephrology care team.       During business hours, you may reach your Nephrology Care Team or schedule or reschedule an appointment or lab at [phone].       If you need to schedule imaging, please call [phone].   To schedule a COVID test, please call [phone].     You can always send a secure message through [employer]. [employer] messages are answered by your nurse or doctor typically within 24-48 hours. Please allow extra time on weekends and holidays.       For urgent/emergent questions after business hours, you may reach the on-call Nephrology Fellow by contacting the [facility]  at [phone].     How will I get the results of any tests ordered?    You will receive all of your results.  If you have signed up for [employer], any tests ordered at your visit will be available to you once resulted on [employer]. Typically the physician reviews them and may or may not make further recommendations. If there are urgent results that require a change in your care plan, your physician or nurse will call you to discuss the next steps. If you are not on [employer], a letter may be generated and mailed to you with your results.

## 2023-09-13 NOTE — LETTER
9/13/2023       RE: Terry Verduzco  [address] [facility] [address]     Dear Colleague,    Thank you for referring your patient, Terry Verduzco, to the [facility] [place] at [facility]. Please see a copy of my visit note below.    I was asked to see this patient by Jalen Souza    CC: CINDY    HPI:   Thank you for the referral. I had the pleasure today of seeing Terry Verduzco. She is a 78 year old male  who presents for evaluation of elevated creatinine. He is here with his wife. They live in [place]. Their Grandon is a nurse at [employer].    His medical history is significant for hypertension. His blood pressure is well controlled.  His blood pressure at home is around 110-120/80-90. He also has skin psoriasis but no other joint involvement.  His medical history is also significant for dyslipidemia for which he is on atorvastatin and triglyceride.    Overall, he feels well.  He denies any specific complaint at this point.  He denies any joint pain, skin rash, urinary symptoms, gross hematuria, difficulty urination, urinary tract infection, or renal colic.  He denies any abdominal pain, diarrhea or chest pain.    He is here today for elevated creatinine.  His creatinine back in 2003 was 1.3.  It has slightly been up since 2020~ 1.4-1.5 mg/dl.  Since June 2023, his creatinine has been trending up 1.6, 1.8 and today it is 1.9 mg/dL.  The only thing that he can remember that happened in June is that he ate 2 packs of RED licorice, which was brought by his wife as a birthday gift.  He did not note any changes in blood pressure.  He denies any COVID19 infection. No herbal medications. No NSAIDs, No swelling in his legs.    Social history: He is .  He has 3 children and 2 stepchildren.  He has several grandchildren and great-grandchildren.  He used to work at the [email or web address] for the [employer] but currently he is retired.   He is originally from [facility]. He speaks Haitian and swedish.      Allergies   Allergen Reactions     Codeine Other (See Comments)     Tachycardia     Demerol [Meperidine] Rash       atorvastatin (LIPITOR) 80 MG tablet, Take 1 tablet (80 mg) by mouth daily  cholecalciferol (VITAMIN D3) 25 mcg (1000 units) capsule, Take 1 capsule by mouth daily  fenofibrate (TRIGLIDE/LOFIBRA) 160 MG tablet, Take 1 tablet (160 mg) by mouth daily  glucosamine-chondroitin 500-400 MG CAPS per capsule, Take 1 capsule by mouth daily  lisinopril-hydrochlorothiazide (ZESTORETIC) 20-12.5 MG tablet, Take 1 tablet by mouth daily  melatonin 5 MG tablet, Take 5 mg by mouth nightly as needed for sleep  metoprolol succinate ER (TOPROL XL) 50 MG 24 hr tablet, Take 1 tablet (50 mg) by mouth daily  multivitamin w/minerals (THERA-VIT-M) tablet, Take 1 tablet by mouth daily  tamsulosin (FLOMAX) 0.4 MG capsule, Take 1 capsule (0.4 mg) by mouth daily  triamcinolone (KENALOG) 0.5 % external ointment, Apply to psoriasis twice daily    cefOXitin (MEFOXIN) 2 g in sodium chloride 0.9 % 100 mL intermittent infusion      Past Medical History:   Diagnosis Date     CKD (chronic kidney disease) stage 3, GFR 30-59 ml/min (H)      Hyperlipidemia LDL goal <100      Hypertension goal BP (blood pressure) < 140/90      Prediabetes 01/01/2021     Primary osteoarthritis involving multiple joints      Psoriasis      Urinary retention 06/2022    post op       Past Surgical History:   Procedure Laterality Date     CYSTOSCOPY  07/2022    urinary retention     DAVINCI XI HERNIORRHAPHY INGUINAL Right 06/24/2022    Procedure: Xi Robotic Assisted HERNIORRHAPHY, INGUINAL, LAPAROSCOPIC, WITH MESH - Right;  Surgeon: Charles Elliott MD;  Location: [facility] OR     LAPAROSCOPIC APPENDECTOMY N/A 07/20/2022    Procedure: Laparoscopic appendectomy;  Surgeon: Charles Elliott MD;  Location: [facility] OR     SURGICAL HISTORY OF -  Right 1982    right inguinal hernia     SURGICAL HISTORY OF  -   1985    cholecystectomy     SURGICAL HISTORY OF -  Left 1964    left testicle removed secondary to injury     SURGICAL HISTORY OF -  Left 01/2021    Left eye - Dr Lees - Macular Hole and Epiretinal Membrane     SURGICAL HISTORY OF -  Left 07/2021    Left IOL - Dr Becerra     SURGICAL HISTORY OF -   07/2022    laproscopic appendectomy - mucocele appendix     VASECTOMY Bilateral 1993       Social History     Tobacco Use     Smoking status: Never     Smokeless tobacco: Never   Vaping Use     Vaping Use: Never used   Substance Use Topics     Alcohol use: Not Currently     Drug use: Never       Family History   Problem Relation Age of Onset     Hypertension Mother      Parkinsonism Father      Parkinsonism Brother      Dementia Maternal Grandfather      Cancer Brother         eye cancer, blastomycosis     Cerebrovascular Disease Brother         bike accident in MVA     Diabetes Paternal Grandfather      ROS: A 12 system review of systems was negative other than noted here or above.     Exam:  /83   Pulse (!) 125   Ht 1.829 m (6')   Wt 83.8 kg (184 lb 11.2 oz)   BMI 25.05 kg/m    BP Readings from Last 6 Encounters:   09/13/23 133/83   06/13/23 110/80   11/07/22 134/74   07/20/22 (!) 147/86   07/18/22 122/70   06/25/22 132/73     GENERAL APPEARANCE: alert and no distress  EYES: PERRL  HENT: mouth without ulcers or lesions  NECK: supple, no adenopathy  RESP: lungs clear to auscultation - no rales, rhonchi or wheezes  CV: regular rhythm, normal rate, no rub   ABDOMEN:  soft, nontender, no HSM or masses and bowel sounds normal  MS: extremities normal- no gross deformities noted, no evidence of inflammation in joints, no muscle tenderness  SKIN: no rash  NEURO: Normal strength and tone, sensory exam grossly normal, mentation intact and speech normal  PSYCH: mentation appears normal. and affect normal/bright  No LE edema    Wt Readings from Last 4 Encounters:   09/13/23 83.8 kg (184 lb 11.2 oz)   06/13/23 90.7 kg  (200 lb)   11/07/22 90.3 kg (199 lb 1.6 oz)   07/20/22 84.6 kg (186 lb 6.4 oz)     Results: Reviewed in details with the patient    Assessment/Plan:   Problem #1 acute kidney injury:   His creatinine back in 2003 was 1.3mg/dl.  It has slightly been up since 2020 ~ 1.4-1.5.mg/dl.  Since June 2023, his creatinine has been trending up 1.6, 1.8 and today it is 1.9 mg/dL.  The only thing that he can remember that happened June 6 is that he ate 2 packs of Red licorice. He did not note any changes in blood pressure. No other herbal medications or NSAIDs. He does not have any evidence of hematuria or proteinuria to suggest any interstitial nephritis or any glomerular nephritis  As a work-up for his acute kidney injury:  - We will check a Cystatin C   - We will check a for monoclonal gammopathy  - We will check kidney ultrasound    Problem #2 chronic kidney disease:  He probably have some underlying chronic kidney disease.  His baseline creatinine is around 1.4 to 1.5 mg/dL.  He is tall (6 inches) so his baseline creatinine is rather higher than average.  - We will order an ultrasound kidney  - We will check cystatin C    Problem #3 essential hypertension: His blood pressure is well controlled    Problem # 4 dyslipidemia: He is on a high dose of fenofibrate and atorvastatin  - We will check CK level.    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    The total time of this encounter amounted to 60 minutes on the day of the encounter 9/13/2023. This time included face to face time spent with the patient, preparatory work and chart review, ordering tests, and performing post visit documentation.    *This dictation was prepared in part using Dragon recognition software.  As a result errors may occur. When identified these transcription errors have been corrected.  While every attempt is made to correct errors during dictation, errors may still exist.     Jesus Candelario MD   [facility]    Department of Medicine   Division of Renal Disease and Hypertension

## 2023-09-13 NOTE — PROGRESS NOTES
I was asked to see this patient by Jalen Souza    CC: CINDY    HPI:   Thank you for the referral. I had the pleasure today of seeing Terry Verduzco. She is a 78 year old male  who presents for evaluation of elevated creatinine. He is here with his wife. They live in [place]. Their Grandon is a nurse at [employer].    His medical history is significant for hypertension. His blood pressure is well controlled.  His blood pressure at home is around 110-120/80-90. He also has skin psoriasis but no other joint involvement.  His medical history is also significant for dyslipidemia for which he is on atorvastatin and triglyceride.    Overall, he feels well.  He denies any specific complaint at this point.  He denies any joint pain, skin rash, urinary symptoms, gross hematuria, difficulty urination, urinary tract infection, or renal colic.  He denies any abdominal pain, diarrhea or chest pain.    He is here today for elevated creatinine.  His creatinine back in 2003 was 1.3.  It has slightly been up since 2020~ 1.4-1.5 mg/dl.  Since June 2023, his creatinine has been trending up 1.6, 1.8 and today it is 1.9 mg/dL.  The only thing that he can remember that happened in June is that he ate 2 packs of RED licorice, which was brought by his wife as a birthday gift.  He did not note any changes in blood pressure.  He denies any COVID19 infection. No herbal medications. No NSAIDs, No swelling in his legs.    Social history: He is .  He has 3 children and 2 stepchildren.  He has several grandchildren and great-grandchildren.  He used to work [place] for the [employer] but currently he is retired.  He is originally from [place]. He speaks Bulgarian and swedish.      Allergies   Allergen Reactions    Codeine Other (See Comments)     Tachycardia    Demerol [Meperidine] Rash       atorvastatin (LIPITOR) 80 MG tablet, Take 1 tablet (80 mg) by mouth daily  cholecalciferol (VITAMIN D3) 25 mcg (1000 units) capsule, Take 1  capsule by mouth daily  fenofibrate (TRIGLIDE/LOFIBRA) 160 MG tablet, Take 1 tablet (160 mg) by mouth daily  glucosamine-chondroitin 500-400 MG CAPS per capsule, Take 1 capsule by mouth daily  lisinopril-hydrochlorothiazide (ZESTORETIC) 20-12.5 MG tablet, Take 1 tablet by mouth daily  melatonin 5 MG tablet, Take 5 mg by mouth nightly as needed for sleep  metoprolol succinate ER (TOPROL XL) 50 MG 24 hr tablet, Take 1 tablet (50 mg) by mouth daily  multivitamin w/minerals (THERA-VIT-M) tablet, Take 1 tablet by mouth daily  tamsulosin (FLOMAX) 0.4 MG capsule, Take 1 capsule (0.4 mg) by mouth daily  triamcinolone (KENALOG) 0.5 % external ointment, Apply to psoriasis twice daily    cefOXitin (MEFOXIN) 2 g in sodium chloride 0.9 % 100 mL intermittent infusion      Past Medical History:   Diagnosis Date    CKD (chronic kidney disease) stage 3, GFR 30-59 ml/min (H)     Hyperlipidemia LDL goal <100     Hypertension goal BP (blood pressure) < 140/90     Prediabetes 01/01/2021    Primary osteoarthritis involving multiple joints     Psoriasis     Urinary retention 06/2022    post op       Past Surgical History:   Procedure Laterality Date    CYSTOSCOPY  07/2022    urinary retention    DAVINCI XI HERNIORRHAPHY INGUINAL Right 06/24/2022    Procedure: Xi Robotic Assisted HERNIORRHAPHY, INGUINAL, LAPAROSCOPIC, WITH MESH - Right;  Surgeon: Charles Elliott MD;  Location: [facility] OR    LAPAROSCOPIC APPENDECTOMY N/A 07/20/2022    Procedure: Laparoscopic appendectomy;  Surgeon: Charles Elliott MD;  Location: [facility] OR    SURGICAL HISTORY OF -  Right 1982    right inguinal hernia    SURGICAL HISTORY OF -   1985    cholecystectomy    SURGICAL HISTORY OF -  Left 1964    left testicle removed secondary to injury    SURGICAL HISTORY OF -  Left 01/2021    Left eye - Dr Lees - Macular Hole and Epiretinal Membrane    SURGICAL HISTORY OF -  Left 07/2021    Left IOL - Dr Becerra    SURGICAL HISTORY OF -   07/2022    laproscopic  appendectomy - mucocele appendix    VASECTOMY Bilateral 1993       Social History     Tobacco Use    Smoking status: Never    Smokeless tobacco: Never   Vaping Use    Vaping Use: Never used   Substance Use Topics    Alcohol use: Not Currently    Drug use: Never       Family History   Problem Relation Age of Onset    Hypertension Mother     Parkinsonism Father     Parkinsonism Brother     Dementia Maternal Grandfather     Cancer Brother         eye cancer, blastomycosis    Cerebrovascular Disease Brother         bike accident in MVA    Diabetes Paternal Grandfather      ROS: A 12 system review of systems was negative other than noted here or above.     Exam:  /83   Pulse (!) 125   Ht 1.829 m (6')   Wt 83.8 kg (184 lb 11.2 oz)   BMI 25.05 kg/m    BP Readings from Last 6 Encounters:   09/13/23 133/83   06/13/23 110/80   11/07/22 134/74   07/20/22 (!) 147/86   07/18/22 122/70   06/25/22 132/73     GENERAL APPEARANCE: alert and no distress  EYES: PERRL  HENT: mouth without ulcers or lesions  NECK: supple, no adenopathy  RESP: lungs clear to auscultation - no rales, rhonchi or wheezes  CV: regular rhythm, normal rate, no rub   ABDOMEN:  soft, nontender, no HSM or masses and bowel sounds normal  MS: extremities normal- no gross deformities noted, no evidence of inflammation in joints, no muscle tenderness  SKIN: no rash  NEURO: Normal strength and tone, sensory exam grossly normal, mentation intact and speech normal  PSYCH: mentation appears normal. and affect normal/bright  No LE edema    Wt Readings from Last 4 Encounters:   09/13/23 83.8 kg (184 lb 11.2 oz)   06/13/23 90.7 kg (200 lb)   11/07/22 90.3 kg (199 lb 1.6 oz)   07/20/22 84.6 kg (186 lb 6.4 oz)     Results: Reviewed in details with the patient    Assessment/Plan:   Problem #1 acute kidney injury:   His creatinine back in 2003 was 1.3mg/dl.  It has slightly been up since 2020 ~ 1.4-1.5.mg/dl.  Since June 2023, his creatinine has been trending up 1.6,  1.8 and today it is 1.9 mg/dL.  The only thing that he can remember that happened June 6 is that he ate 2 packs of Red licorice. He did not note any changes in blood pressure. No other herbal medications or NSAIDs. He does not have any evidence of hematuria or proteinuria to suggest any interstitial nephritis or any glomerular nephritis  As a work-up for his acute kidney injury:  - We will check a Cystatin C   - We will check a for monoclonal gammopathy  - We will check kidney ultrasound    Problem #2 chronic kidney disease:  He probably have some underlying chronic kidney disease.  His baseline creatinine is around 1.4 to 1.5 mg/dL.  He is tall (6 inches) so his baseline creatinine is rather higher than average.  - We will order an ultrasound kidney  - We will check cystatin C    Problem #3 essential hypertension: His blood pressure is well controlled    Problem # 4 dyslipidemia: He is on a high dose of fenofibrate and atorvastatin  - We will check CK level.    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    The total time of this encounter amounted to 60 minutes on the day of the encounter 9/13/2023. This time included face to face time spent with the patient, preparatory work and chart review, ordering tests, and performing post visit documentation.    *This dictation was prepared in part using Dragon recognition software.  As a result errors may occur. When identified these transcription errors have been corrected.  While every attempt is made to correct errors during dictation, errors may still exist.     Jesus Candelario MD   [facility]   Department of Medicine   Division of Renal Disease and Hypertension

## 2023-09-14 ENCOUNTER — TELEPHONE (OUTPATIENT)
Dept: NEPHROLOGY | Facility: CLINIC | Age: 78
End: 2023-09-14

## 2023-09-14 LAB
ALBUMIN SERPL ELPH-MCNC: 3.9 G/DL (ref 3.7–5.1)
ALPHA1 GLOB SERPL ELPH-MCNC: 0.3 G/DL (ref 0.2–0.4)
ALPHA2 GLOB SERPL ELPH-MCNC: 0.7 G/DL (ref 0.5–0.9)
B-GLOBULIN SERPL ELPH-MCNC: 0.9 G/DL (ref 0.6–1)
CK SERPL-CCNC: 99 U/L (ref 39–308)
CYSTATIN C (ROCHE): 1.6 MG/L (ref 0.6–1)
GAMMA GLOB SERPL ELPH-MCNC: 1.2 G/DL (ref 0.7–1.6)
GFR SERPL CREATININE-BSD FRML MDRD: 39 ML/MIN/1.73M2
KAPPA LC FREE SER-MCNC: 3.64 MG/DL (ref 0.33–1.94)
KAPPA LC FREE/LAMBDA FREE SER NEPH: 1.66 {RATIO} (ref 0.26–1.65)
LAMBDA LC FREE SERPL-MCNC: 2.19 MG/DL (ref 0.57–2.63)
M PROTEIN SERPL ELPH-MCNC: 0 G/DL
PROT PATTERN SERPL ELPH-IMP: NORMAL
PROT PATTERN SERPL IFE-IMP: NORMAL
TOTAL PROTEIN SERUM FOR ELP: 7.1 G/DL (ref 6.4–8.3)

## 2023-09-14 NOTE — TELEPHONE ENCOUNTER
[facility] Call Center    Phone Message    May a detailed message be left on voicemail: yes     Reason for Call: Other: Please add lab orders for Pt's 3/13 appointment with Dr. Candelario. Thank you!     Action Taken: Other: Neph    Travel Screening: Not Applicable

## 2023-09-26 ENCOUNTER — HOSPITAL ENCOUNTER (OUTPATIENT)
Dept: ULTRASOUND IMAGING | Facility: CLINIC | Age: 78
Discharge: HOME | End: 2023-09-26
Attending: INTERNAL MEDICINE | Admitting: INTERNAL MEDICINE
Payer: COMMERCIAL

## 2023-09-26 DIAGNOSIS — N18.32 STAGE 3B CHRONIC KIDNEY DISEASE (H): ICD-10-CM

## 2023-09-26 PROCEDURE — 76770 US EXAM ABDO BACK WALL COMP: CPT

## 2023-10-04 ENCOUNTER — LAB (OUTPATIENT)
Dept: LAB | Facility: CLINIC | Age: 78
End: 2023-10-04
Payer: COMMERCIAL

## 2023-10-04 DIAGNOSIS — N17.9 AKI (ACUTE KIDNEY INJURY) (H): ICD-10-CM

## 2023-10-04 PROCEDURE — 36415 COLL VENOUS BLD VENIPUNCTURE: CPT

## 2023-10-04 PROCEDURE — 80048 BASIC METABOLIC PNL TOTAL CA: CPT

## 2023-10-05 LAB
ANION GAP SERPL CALCULATED.3IONS-SCNC: 9 MMOL/L (ref 7–15)
BUN SERPL-MCNC: 20.8 MG/DL (ref 8–23)
CALCIUM SERPL-MCNC: 9.9 MG/DL (ref 8.8–10.2)
CHLORIDE SERPL-SCNC: 99 MMOL/L (ref 98–107)
CREAT SERPL-MCNC: 1.57 MG/DL (ref 0.67–1.17)
DEPRECATED HCO3 PLAS-SCNC: 26 MMOL/L (ref 22–29)
EGFRCR SERPLBLD CKD-EPI 2021: 45 ML/MIN/1.73M2
GLUCOSE SERPL-MCNC: 105 MG/DL (ref 70–99)
POTASSIUM SERPL-SCNC: 4.5 MMOL/L (ref 3.4–5.3)
SODIUM SERPL-SCNC: 134 MMOL/L (ref 135–145)

## 2023-10-16 ENCOUNTER — OFFICE VISIT (OUTPATIENT)
Dept: FAMILY MEDICINE | Facility: CLINIC | Age: 78
End: 2023-10-16
Payer: COMMERCIAL

## 2023-10-16 VITALS
SYSTOLIC BLOOD PRESSURE: 117 MMHG | TEMPERATURE: 98.5 F | HEART RATE: 98 BPM | HEIGHT: 72 IN | WEIGHT: 183 LBS | OXYGEN SATURATION: 95 % | RESPIRATION RATE: 12 BRPM | BODY MASS INDEX: 24.79 KG/M2 | DIASTOLIC BLOOD PRESSURE: 80 MMHG

## 2023-10-16 DIAGNOSIS — H92.01 OTALGIA, RIGHT: Primary | ICD-10-CM

## 2023-10-16 PROCEDURE — 99213 OFFICE O/P EST LOW 20 MIN: CPT | Performed by: FAMILY MEDICINE

## 2023-10-16 RX ORDER — NEOMYCIN SULFATE, POLYMYXIN B SULFATE AND HYDROCORTISONE 10; 3.5; 1 MG/ML; MG/ML; [USP'U]/ML
3 SUSPENSION/ DROPS AURICULAR (OTIC) 4 TIMES DAILY
Qty: 10 ML | Refills: 0 | Status: SHIPPED | OUTPATIENT
Start: 2023-10-16 | End: 2024-03-13

## 2023-10-16 RX ORDER — RESPIRATORY SYNCYTIAL VIRUS VACCINE 120MCG/0.5
0.5 KIT INTRAMUSCULAR ONCE
Qty: 1 EACH | Refills: 0 | Status: CANCELLED | OUTPATIENT
Start: 2023-10-16 | End: 2023-10-16

## 2023-10-16 NOTE — PROGRESS NOTES
Assessment & Plan     Otalgia, right  Given pains, will do an ear drop with steroids to see if pain improves.  Follow up if not improving or if worsening.  - neomycin-polymyxin-hydrocortisone (CORTISPORIN) 3.5-18671-5 otic suspension; Place 3 drops into the right ear 4 times daily      8 minutes spent by me on the date of the encounter doing chart review, history and exam, documentation and further activities per the note       See Patient Instructions    Debby Linares MD  [facility]    Subjective   Ramos is a 78 year old, presenting for the following health issues:  Ear Problem        10/16/2023     2:42 PM   Additional Questions   Roomed by Molly BOONE CMA       History of Present Illness       Reason for visit:  Earache  Symptom onset:  3-7 days ago  Symptoms include:  Pain in left ear radiating upward  Symptom intensity:  Moderate  Symptom progression:  Staying the same  Had these symptoms before:  Yes  Has tried/received treatment for these symptoms:  Yes  Previous treatment was successful:  Yes  Prior treatment description:  Antibiotic  What makes it worse:  No  What makes it better:  No    He eats 2-3 servings of fruits and vegetables daily.He consumes 0 sweetened beverage(s) daily. He exercises with enough effort to increase his heart rate 3 or less days per week.   He is taking medications regularly.         Right ear, not left. Takes advil at bedtime, it has woke up him up from sleep.  No hearing loss.  No nasal congestion or runny nose.  No ear drainage.  No fevers or chill.  Going on for 4-5 days.          Review of Systems   HENT:  Positive for ear pain.       Constitutional, HEENT, cardiovascular, pulmonary, gi and gu systems are negative, except as otherwise noted.      Objective    /80 (BP Location: Right arm, Patient Position: Sitting, Cuff Size: Adult Regular)   Pulse 98   Temp 98.5  F (36.9  C) (Oral)   Resp 12   Ht 1.829 m (6')   Wt 83 kg (183 lb)   SpO2  95%   BMI 24.82 kg/m    Body mass index is 24.82 kg/m .  Physical Exam   GENERAL: healthy, alert and no distress  HENT: normal cephalic/atraumatic, ear canals and TM's normal, and nose and mouth without ulcers or lesions

## 2024-03-11 ENCOUNTER — DOCUMENTATION ONLY (OUTPATIENT)
Dept: NEPHROLOGY | Facility: CLINIC | Age: 79
End: 2024-03-11
Payer: COMMERCIAL

## 2024-03-11 DIAGNOSIS — N17.9 AKI (ACUTE KIDNEY INJURY) (H): ICD-10-CM

## 2024-03-11 DIAGNOSIS — N18.32 STAGE 3B CHRONIC KIDNEY DISEASE (H): Primary | ICD-10-CM

## 2024-03-11 DIAGNOSIS — N25.81 SECONDARY HYPERPARATHYROIDISM OF RENAL ORIGIN (H): ICD-10-CM

## 2024-03-13 ENCOUNTER — LAB (OUTPATIENT)
Dept: LAB | Facility: CLINIC | Age: 79
End: 2024-03-13
Attending: INTERNAL MEDICINE
Payer: COMMERCIAL

## 2024-03-13 ENCOUNTER — OFFICE VISIT (OUTPATIENT)
Dept: NEPHROLOGY | Facility: CLINIC | Age: 79
End: 2024-03-13
Attending: INTERNAL MEDICINE
Payer: COMMERCIAL

## 2024-03-13 VITALS
SYSTOLIC BLOOD PRESSURE: 129 MMHG | BODY MASS INDEX: 24.92 KG/M2 | HEIGHT: 72 IN | HEART RATE: 76 BPM | DIASTOLIC BLOOD PRESSURE: 79 MMHG | OXYGEN SATURATION: 98 % | WEIGHT: 184 LBS

## 2024-03-13 DIAGNOSIS — D63.1 ANEMIA IN STAGE 3A CHRONIC KIDNEY DISEASE (H): Primary | ICD-10-CM

## 2024-03-13 DIAGNOSIS — N25.81 SECONDARY HYPERPARATHYROIDISM OF RENAL ORIGIN (H): ICD-10-CM

## 2024-03-13 DIAGNOSIS — N17.9 AKI (ACUTE KIDNEY INJURY) (H): ICD-10-CM

## 2024-03-13 DIAGNOSIS — N18.32 STAGE 3B CHRONIC KIDNEY DISEASE (H): ICD-10-CM

## 2024-03-13 DIAGNOSIS — N18.31 ANEMIA IN STAGE 3A CHRONIC KIDNEY DISEASE (H): Primary | ICD-10-CM

## 2024-03-13 DIAGNOSIS — N18.31 STAGE 3A CHRONIC KIDNEY DISEASE (H): ICD-10-CM

## 2024-03-13 LAB
ALBUMIN MFR UR ELPH: <6 MG/DL
ALBUMIN SERPL BCG-MCNC: 4.2 G/DL (ref 3.5–5.2)
ANION GAP SERPL CALCULATED.3IONS-SCNC: 11 MMOL/L (ref 7–15)
BUN SERPL-MCNC: 27.9 MG/DL (ref 8–23)
CALCIUM SERPL-MCNC: 9.6 MG/DL (ref 8.8–10.2)
CHLORIDE SERPL-SCNC: 102 MMOL/L (ref 98–107)
CREAT SERPL-MCNC: 1.64 MG/DL (ref 0.67–1.17)
CREAT UR-MCNC: 62.6 MG/DL
CREAT UR-MCNC: 63.1 MG/DL
DEPRECATED HCO3 PLAS-SCNC: 25 MMOL/L (ref 22–29)
EGFRCR SERPLBLD CKD-EPI 2021: 43 ML/MIN/1.73M2
ERYTHROCYTE [DISTWIDTH] IN BLOOD BY AUTOMATED COUNT: 12.9 % (ref 10–15)
GLUCOSE SERPL-MCNC: 116 MG/DL (ref 70–99)
HCT VFR BLD AUTO: 44 % (ref 40–53)
HGB BLD-MCNC: 14.6 G/DL (ref 13.3–17.7)
MCH RBC QN AUTO: 30.4 PG (ref 26.5–33)
MCHC RBC AUTO-ENTMCNC: 33.2 G/DL (ref 31.5–36.5)
MCV RBC AUTO: 92 FL (ref 78–100)
MICROALBUMIN UR-MCNC: <12 MG/L
MICROALBUMIN/CREAT UR: NORMAL MG/G{CREAT}
PHOSPHATE SERPL-MCNC: 2.5 MG/DL (ref 2.5–4.5)
PLATELET # BLD AUTO: 256 10E3/UL (ref 150–450)
POTASSIUM SERPL-SCNC: 4.7 MMOL/L (ref 3.4–5.3)
PROT/CREAT 24H UR: NORMAL MG/G{CREAT}
PTH-INTACT SERPL-MCNC: 40 PG/ML (ref 15–65)
RBC # BLD AUTO: 4.8 10E6/UL (ref 4.4–5.9)
SODIUM SERPL-SCNC: 138 MMOL/L (ref 135–145)
VIT D+METAB SERPL-MCNC: 43 NG/ML (ref 20–50)
WBC # BLD AUTO: 7.1 10E3/UL (ref 4–11)

## 2024-03-13 PROCEDURE — G0463 HOSPITAL OUTPT CLINIC VISIT: HCPCS | Performed by: INTERNAL MEDICINE

## 2024-03-13 PROCEDURE — 99000 SPECIMEN HANDLING OFFICE-LAB: CPT | Performed by: PATHOLOGY

## 2024-03-13 PROCEDURE — 83970 ASSAY OF PARATHORMONE: CPT | Performed by: PATHOLOGY

## 2024-03-13 PROCEDURE — 82570 ASSAY OF URINE CREATININE: CPT | Performed by: INTERNAL MEDICINE

## 2024-03-13 PROCEDURE — 84156 ASSAY OF PROTEIN URINE: CPT | Performed by: PATHOLOGY

## 2024-03-13 PROCEDURE — 82306 VITAMIN D 25 HYDROXY: CPT | Performed by: INTERNAL MEDICINE

## 2024-03-13 PROCEDURE — 80069 RENAL FUNCTION PANEL: CPT | Performed by: PATHOLOGY

## 2024-03-13 PROCEDURE — 36415 COLL VENOUS BLD VENIPUNCTURE: CPT | Performed by: PATHOLOGY

## 2024-03-13 PROCEDURE — 82610 CYSTATIN C: CPT | Performed by: INTERNAL MEDICINE

## 2024-03-13 PROCEDURE — 99214 OFFICE O/P EST MOD 30 MIN: CPT | Performed by: INTERNAL MEDICINE

## 2024-03-13 PROCEDURE — 85027 COMPLETE CBC AUTOMATED: CPT | Performed by: PATHOLOGY

## 2024-03-13 ASSESSMENT — PAIN SCALES - GENERAL: PAINLEVEL: NO PAIN (0)

## 2024-03-13 NOTE — NURSING NOTE
Chief Complaint   Patient presents with    RECHECK     CKD     Vital signs:      BP: 129/79 Pulse: 76     SpO2: 98 %     Height: 182.9 cm (6') (pt reported) Weight: 83.5 kg (184 lb)  Estimated body mass index is 24.95 kg/m  as calculated from the following:    Height as of this encounter: 1.829 m (6').    Weight as of this encounter: 83.5 kg (184 lb).      Chetna Gregory CMA   3/13/2024 1:26 PM

## 2024-03-13 NOTE — LETTER
3/13/2024       RE: Terry Verduzco  [address] [facility] [address]     Dear Colleague,    Thank you for referring your patient, Terry Verduzco, to the [facility] [place] at [facility]. Please see a copy of my visit note below.    I was asked to see this patient by Jalen Souza    CC: CINDY    HPI:   I had the pleasure today of seeing Terry Verduzco. She is a 78 year old male  who presents for evaluation of elevated creatinine. He is here with his wife. They live in [place]. Their Grandon is a nurse at [employer] [facility] [place].    His medical history is significant for hypertension. His blood pressure is well controlled.  His blood pressure at home is around 110-120/80-90. He also has skin psoriasis but no joint involvement.  His medical history is also significant for dyslipidemia for which he is on atorvastatin and fenofibrate.    Overall, he feels well.  He denies any specific complaint at this point.  He denies any joint pain, skin rash, urinary symptoms, gross hematuria, difficulty urination, urinary tract infection, or renal colic.  He denies any abdominal pain, diarrhea or chest pain. He intentionally lost few pounds.    He is here today for CKD. His creatinine back in 2003 was 1.3.  It has slightly been up since 2020~ 1.4-1.5 mg/dl. He did not note any changes in blood pressure.  He denies any COVID19 infection. No herbal medications. No NSAIDs, No swelling in his legs.    Social history: He is .  He has 3 children and 2 stepchildren.  He has several grandchildren and great-grandchildren.  [place] used to [place] for the [employer] but currently he is retired.  He is originally from [place]. He speaks Nauruan and swedish.      Allergies   Allergen Reactions     Codeine Other (See Comments)     Tachycardia     Demerol [Meperidine] Rash       atorvastatin (LIPITOR) 80 MG tablet, Take 1 tablet (80 mg) by mouth  daily  cholecalciferol (VITAMIN D3) 25 mcg (1000 units) capsule, Take 1 capsule by mouth daily  fenofibrate (TRIGLIDE/LOFIBRA) 160 MG tablet, Take 1 tablet (160 mg) by mouth daily  glucosamine-chondroitin 500-400 MG CAPS per capsule, Take 1 capsule by mouth daily  lisinopril-hydrochlorothiazide (ZESTORETIC) 20-12.5 MG tablet, Take 1 tablet by mouth daily  melatonin 5 MG tablet, Take 5 mg by mouth nightly as needed for sleep  metoprolol succinate ER (TOPROL XL) 50 MG 24 hr tablet, Take 1 tablet (50 mg) by mouth daily  multivitamin w/minerals (THERA-VIT-M) tablet, Take 1 tablet by mouth daily  tamsulosin (FLOMAX) 0.4 MG capsule, Take 1 capsule (0.4 mg) by mouth daily  triamcinolone (KENALOG) 0.5 % external ointment, Apply to psoriasis twice daily  neomycin-polymyxin-hydrocortisone (CORTISPORIN) 3.5-29483-1 otic suspension, Place 3 drops into the right ear 4 times daily    cefOXitin (MEFOXIN) 2 g in sodium chloride 0.9 % 100 mL intermittent infusion      Past Medical History:   Diagnosis Date     CKD (chronic kidney disease) stage 3, GFR 30-59 ml/min (H)     Dr Candelario     Hyperlipidemia LDL goal <100      Hypertension goal BP (blood pressure) < 140/90      Prediabetes 01/01/2021     Primary osteoarthritis involving multiple joints      Psoriasis      Urinary retention 06/2022    post op       Past Surgical History:   Procedure Laterality Date     CYSTOSCOPY  07/2022    urinary retention     DAVINCI XI HERNIORRHAPHY INGUINAL Right 06/24/2022    Procedure: Xi Robotic Assisted HERNIORRHAPHY, INGUINAL, LAPAROSCOPIC, WITH MESH - Right;  Surgeon: Charles Elliott MD;  Location: [facility] OR     LAPAROSCOPIC APPENDECTOMY N/A 07/20/2022    Procedure: Laparoscopic appendectomy;  Surgeon: Charles Elliott MD;  Location: [facility] OR     SURGICAL HISTORY OF -  Right 1982    right inguinal hernia     SURGICAL HISTORY OF -   1985    cholecystectomy     SURGICAL HISTORY OF -  Left 1964    left testicle removed secondary to injury      SURGICAL HISTORY OF -  Left 01/2021    Left eye - Dr Lees - Macular Hole and Epiretinal Membrane     SURGICAL HISTORY OF -  Left 07/2021    Left IOL - Dr Becerra     SURGICAL HISTORY OF -   07/2022    laproscopic appendectomy - mucocele appendix     VASECTOMY Bilateral 1993       Social History     Tobacco Use     Smoking status: Never     Smokeless tobacco: Never   Vaping Use     Vaping Use: Never used   Substance Use Topics     Alcohol use: Not Currently     Drug use: Never       Family History   Problem Relation Age of Onset     Hypertension Mother      Parkinsonism Father      Parkinsonism Brother      Dementia Maternal Grandfather      Cancer Brother         eye cancer, blastomycosis     Cerebrovascular Disease Brother         bike accident in MVA     Diabetes Paternal Grandfather      ROS: A 12 system review of systems was negative other than noted here or above.     Exam:  /79 (BP Location: Left arm, Patient Position: Sitting, Cuff Size: Adult Regular)   Pulse 76   Ht 1.829 m (6')   Wt 83.5 kg (184 lb)   SpO2 98%   BMI 24.95 kg/m    BP Readings from Last 6 Encounters:   03/13/24 129/79   10/16/23 117/80   09/13/23 133/83   06/13/23 110/80   11/07/22 134/74   07/20/22 (!) 147/86     GENERAL APPEARANCE: alert and no distress  EYES: PERRL  HENT: mouth without ulcers or lesions  NECK: supple, no adenopathy  RESP: lungs clear to auscultation - no rales, rhonchi or wheezes  CV: regular rhythm, normal rate, no rub   ABDOMEN:  soft, nontender, no HSM or masses and bowel sounds normal  MS: extremities normal- no gross deformities noted, no evidence of inflammation in joints, no muscle tenderness  SKIN: psoriatic lesions over his hands  NEURO: Normal strength and tone, sensory exam grossly normal, mentation intact and speech normal  PSYCH: mentation appears normal. and affect normal/bright  No LE edema    Wt Readings from Last 4 Encounters:   03/13/24 83.5 kg (184 lb)   10/16/23 83 kg (183 lb)   09/13/23  83.8 kg (184 lb 11.2 oz)   06/13/23 90.7 kg (200 lb)     Results: Reviewed in details with the patient    Assessment/Plan:   Problem #1 chronic kidney disease:  Likely secondary to aging, residual from prior CINDY and HTN. Creatinine back in 2003 was 1.3.  It has slightly been up since 2020~ 1.4-1.5 mg/dl. His baseline creatinine is around 1.5-1.6 mg/dL currently.  He is tall (6 inches) so his baseline creatinine is rather higher than average and rather underestimates his GFR.  -We will check cystatin C  -Euvolemic on exam  -No evidence of anemia  -No electrolyte disturbances  -No metabolic acidosis    Problem #3 essential hypertension: His blood pressure is well controlled and at goal.     Problem # 4 dyslipidemia: He is on fenofibrate and atorvastatin. LDL and TG at goal.    Problem #5 CKD-MBD: calcium, phos, PTH are normal.    *This dictation was prepared in part using Dragon recognition software.  As a result errors may occur. When identified these transcription errors have been corrected.  While every attempt is made to correct errors during dictation, errors may still exist.     Jesus Candelario MD   [facility]   Department of Medicine   Division of Renal Disease and Hypertension

## 2024-03-13 NOTE — PROGRESS NOTES
I was asked to see this patient by Jalen Souza    CC: CINDY    HPI:   I had the pleasure today of seeing Terry Verduzco. She is a 78 year old male  who presents for evaluation of elevated creatinine. He is here with his wife. They live in [place]. Their Grandon is a nurse at [employer] [facility] [employer].    His medical history is significant for hypertension. His blood pressure is well controlled.  His blood pressure at home is around 110-120/80-90. He also has skin psoriasis but no joint involvement.  His medical history is also significant for dyslipidemia for which he is on atorvastatin and fenofibrate.    Overall, he feels well.  He denies any specific complaint at this point.  He denies any joint pain, skin rash, urinary symptoms, gross hematuria, difficulty urination, urinary tract infection, or renal colic.  He denies any abdominal pain, diarrhea or chest pain. He intentionally lost few pounds.    He is here today for CKD. His creatinine back in 2003 was 1.3.  It has slightly been up since 2020~ 1.4-1.5 mg/dl. He did not note any changes in blood pressure.  He denies any COVID19 infection. No herbal medications. No NSAIDs, No swelling in his legs.    Social history: He is .  He has 3 children and 2 stepchildren.  He has several grandchildren and great-grandchildren.  He used to work [place] for the [employer] but currently he is retired.  He is originally from [place]. He speaks Emirati and swedish.      Allergies   Allergen Reactions    Codeine Other (See Comments)     Tachycardia    Demerol [Meperidine] Rash       atorvastatin (LIPITOR) 80 MG tablet, Take 1 tablet (80 mg) by mouth daily  cholecalciferol (VITAMIN D3) 25 mcg (1000 units) capsule, Take 1 capsule by mouth daily  fenofibrate (TRIGLIDE/LOFIBRA) 160 MG tablet, Take 1 tablet (160 mg) by mouth daily  glucosamine-chondroitin 500-400 MG CAPS per capsule, Take 1 capsule by mouth daily  lisinopril-hydrochlorothiazide (ZESTORETIC) 20-12.5 MG tablet, Take  1 tablet by mouth daily  melatonin 5 MG tablet, Take 5 mg by mouth nightly as needed for sleep  metoprolol succinate ER (TOPROL XL) 50 MG 24 hr tablet, Take 1 tablet (50 mg) by mouth daily  multivitamin w/minerals (THERA-VIT-M) tablet, Take 1 tablet by mouth daily  tamsulosin (FLOMAX) 0.4 MG capsule, Take 1 capsule (0.4 mg) by mouth daily  triamcinolone (KENALOG) 0.5 % external ointment, Apply to psoriasis twice daily  neomycin-polymyxin-hydrocortisone (CORTISPORIN) 3.5-48935-3 otic suspension, Place 3 drops into the right ear 4 times daily    cefOXitin (MEFOXIN) 2 g in sodium chloride 0.9 % 100 mL intermittent infusion      Past Medical History:   Diagnosis Date    CKD (chronic kidney disease) stage 3, GFR 30-59 ml/min (H)     Dr Candelario    Hyperlipidemia LDL goal <100     Hypertension goal BP (blood pressure) < 140/90     Prediabetes 01/01/2021    Primary osteoarthritis involving multiple joints     Psoriasis     Urinary retention 06/2022    post op       Past Surgical History:   Procedure Laterality Date    CYSTOSCOPY  07/2022    urinary retention    DAVINCI XI HERNIORRHAPHY INGUINAL Right 06/24/2022    Procedure: Xi Robotic Assisted HERNIORRHAPHY, INGUINAL, LAPAROSCOPIC, WITH MESH - Right;  Surgeon: Charles Elliott MD;  Location: [facility] OR    LAPAROSCOPIC APPENDECTOMY N/A 07/20/2022    Procedure: Laparoscopic appendectomy;  Surgeon: Charles Elliott MD;  Location: [facility] OR    SURGICAL HISTORY OF -  Right 1982    right inguinal hernia    SURGICAL HISTORY OF -   1985    cholecystectomy    SURGICAL HISTORY OF -  Left 1964    left testicle removed secondary to injury    SURGICAL HISTORY OF -  Left 01/2021    Left eye - Dr Lees - Macular Hole and Epiretinal Membrane    SURGICAL HISTORY OF -  Left 07/2021    Left IOL - Dr Becerra    SURGICAL HISTORY OF -   07/2022    laproscopic appendectomy - mucocele appendix    VASECTOMY Bilateral 1993       Social History     Tobacco Use    Smoking status: Never     Smokeless tobacco: Never   Vaping Use    Vaping Use: Never used   Substance Use Topics    Alcohol use: Not Currently    Drug use: Never       Family History   Problem Relation Age of Onset    Hypertension Mother     Parkinsonism Father     Parkinsonism Brother     Dementia Maternal Grandfather     Cancer Brother         eye cancer, blastomycosis    Cerebrovascular Disease Brother         bike accident in MVA    Diabetes Paternal Grandfather      ROS: A 12 system review of systems was negative other than noted here or above.     Exam:  /79 (BP Location: Left arm, Patient Position: Sitting, Cuff Size: Adult Regular)   Pulse 76   Ht 1.829 m (6')   Wt 83.5 kg (184 lb)   SpO2 98%   BMI 24.95 kg/m    BP Readings from Last 6 Encounters:   03/13/24 129/79   10/16/23 117/80   09/13/23 133/83   06/13/23 110/80   11/07/22 134/74   07/20/22 (!) 147/86     GENERAL APPEARANCE: alert and no distress  EYES: PERRL  HENT: mouth without ulcers or lesions  NECK: supple, no adenopathy  RESP: lungs clear to auscultation - no rales, rhonchi or wheezes  CV: regular rhythm, normal rate, no rub   ABDOMEN:  soft, nontender, no HSM or masses and bowel sounds normal  MS: extremities normal- no gross deformities noted, no evidence of inflammation in joints, no muscle tenderness  SKIN: psoriatic lesions over his hands  NEURO: Normal strength and tone, sensory exam grossly normal, mentation intact and speech normal  PSYCH: mentation appears normal. and affect normal/bright  No LE edema    Wt Readings from Last 4 Encounters:   03/13/24 83.5 kg (184 lb)   10/16/23 83 kg (183 lb)   09/13/23 83.8 kg (184 lb 11.2 oz)   06/13/23 90.7 kg (200 lb)     Results: Reviewed in details with the patient    Assessment/Plan:   Problem #1 chronic kidney disease:  Likely secondary to aging, residual from prior CINDY and HTN. Creatinine back in 2003 was 1.3.  It has slightly been up since 2020~ 1.4-1.5 mg/dl. His baseline creatinine is around 1.5-1.6 mg/dL  currently.  He is tall (6 inches) so his baseline creatinine is rather higher than average and rather underestimates his GFR.  -We will check cystatin C  -Euvolemic on exam  -No evidence of anemia  -No electrolyte disturbances  -No metabolic acidosis    Problem #3 essential hypertension: His blood pressure is well controlled and at goal.     Problem # 4 dyslipidemia: He is on fenofibrate and atorvastatin. LDL and TG at goal.    Problem #5 CKD-MBD: calcium, phos, PTH are normal.    *This dictation was prepared in part using Dragon recognition software.  As a result errors may occur. When identified these transcription errors have been corrected.  While every attempt is made to correct errors during dictation, errors may still exist.     Jesus Candelario MD   [facility]   Department of Medicine   Division of Renal Disease and Hypertension

## 2024-03-13 NOTE — PATIENT INSTRUCTIONS
It was a pleasure taking care of you today.  I've included a brief summary of our discussion and care plan from today's visit below.  Please review this information with your primary care provider.     My recommendations are summarized as follows:  -Kidney numbers are stable  -BP is at goal    Who do I call with any questions after my visit?  Please be in touch if there are any further questions that arise following today's visit.  There are multiple ways to contact your nephrology care team.       During business hours, you may reach your Nephrology Care Team or schedule or reschedule an appointment or lab at [phone].       If you need to schedule imaging, please call [phone].   To schedule a COVID test, please call [phone].     You can always send a secure message through Virtual Command. Virtual Command messages are answered by your nurse or doctor typically within 24-48 hours. Please allow extra time on weekends and holidays.       For urgent/emergent questions after business hours, you may reach the on-call Nephrology Fellow by contacting the [facility]  at [phone].     How will I get the results of any tests ordered?    You will receive all of your results.  If you have signed up for Virtual Command, any tests ordered at your visit will be available to you once resulted on Veevat. Typically the physician reviews them and may or may not make further recommendations. If there are urgent results that require a change in your care plan, your physician or nurse will call you to discuss the next steps. If you are not on GoBeMehart, a letter may be generated and mailed to you with your results.

## 2024-03-14 ENCOUNTER — TELEPHONE (OUTPATIENT)
Dept: NEPHROLOGY | Facility: CLINIC | Age: 79
End: 2024-03-14
Payer: COMMERCIAL

## 2024-03-14 LAB
CYSTATIN C (ROCHE): 1.5 MG/L (ref 0.6–1)
GFR SERPL CREATININE-BSD FRML MDRD: 42 ML/MIN/1.73M2

## 2024-03-14 NOTE — TELEPHONE ENCOUNTER
Left Voicemail (1st Attempt) and Sent Mychart (1st Attempt) for the patient to schedule:    Appointment type: Return Nephrology  Provider: Dr. Candelario  Return date: Approx. 12/13  Phone number: [phone]  Add'l appt(s) needed: Lab appointment 1-hour prior to clinic visit

## 2024-04-24 NOTE — TELEPHONE ENCOUNTER
Attempted to call patient for post op check.  No answer.  Mychart and voice message was left for patient to call back if they had any questions of concerns.     Grace Sheth PA-C   +JEFF Chair, RA, Speech clear, answering questions and following directives however continues with some level of confusion

## 2024-06-07 ENCOUNTER — TELEPHONE (OUTPATIENT)
Dept: NEPHROLOGY | Facility: CLINIC | Age: 79
End: 2024-06-07
Payer: COMMERCIAL

## 2024-06-12 SDOH — HEALTH STABILITY: PHYSICAL HEALTH: ON AVERAGE, HOW MANY MINUTES DO YOU ENGAGE IN EXERCISE AT THIS LEVEL?: 40 MIN

## 2024-06-12 SDOH — HEALTH STABILITY: PHYSICAL HEALTH: ON AVERAGE, HOW MANY DAYS PER WEEK DO YOU ENGAGE IN MODERATE TO STRENUOUS EXERCISE (LIKE A BRISK WALK)?: 3 DAYS

## 2024-06-12 ASSESSMENT — SOCIAL DETERMINANTS OF HEALTH (SDOH): HOW OFTEN DO YOU GET TOGETHER WITH FRIENDS OR RELATIVES?: ONCE A WEEK

## 2024-06-13 NOTE — PROGRESS NOTES
[address] [address]  Phone: [phone]  Primary Provider: Jalen Daly  Pre-op Performing Provider: ALVINO ROSS    PREOPERATIVE EVALUATION:  Today's date: 7/18/2022    Terry Verduzco is a 77 year old male who presents for a preoperative evaluation.    Surgical Information:  Surgery/Procedure: APPENDECTOMY, LAPAROSCOPIC  Surgery Location: [facility]   Surgeon: Charles Elliott MD  Surgery Date: 7/20/22  Time of Surgery: 9:00 AM   Where patient plans to recover: At home with family  Fax number for surgical facility: Note does not need to be faxed, will be available electronically in Epic.    Type of Anesthesia Anticipated: General    Assessment & Plan     The proposed surgical procedure is considered INTERMEDIATE risk.    Preoperative examination    Mucocele of appendix         Risks and Recommendations:  The patient has the following additional risks and recommendations for perioperative complications:   - No identified additional risk factors other than previously addressed    Medication Instructions:  - ACE/ARB: HOLD due to exceptional risk of hypotension during surgery.   - Beta Blockers: Continue taking on the day of surgery.   - Statins: Continue taking on the day of surgery.    -Tamsulosin: Continue taking on the day of surgery.    RECOMMENDATION:  APPROVAL GIVEN to proceed with proposed procedure, without further diagnostic evaluation.        Subjective     HPI related to upcoming procedure: History of recent inguinal hernia repair. During surgery, appendix noted to be inflamed. CT on 7/8/2022 showed dilated fluid-filled appendix worrisome for an appendiceal mucocele.    Preop Questions 7/11/2022   1. Have you ever had a heart attack or stroke? No   2. Have you ever had surgery on your heart or blood vessels, such as a stent placement, a coronary artery bypass, or surgery on an artery in your head, neck, heart, or legs? No   3.  Do you have chest pain with activity? No   4. Do you have a history of  heart failure? No   5. Do you currently have a cold, bronchitis or symptoms of other infection? No   6. Do you have a cough, shortness of breath, or wheezing? No   7. Do you or anyone in your family have previous history of blood clots? No   8. Do you or does anyone in your family have a serious bleeding problem such as prolonged bleeding following surgeries or cuts? No   9. Have you ever had problems with anemia or been told to take iron pills? No   10. Have you had any abnormal blood loss such as black, tarry or bloody stools? No   11. Have you ever had a blood transfusion? No   12. Are you willing to have a blood transfusion if it is medically needed before, during, or after your surgery? Yes   13. Have you or any of your relatives ever had problems with anesthesia? No   14. Do you have sleep apnea, excessive snoring or daytime drowsiness? No   15. Do you have any artifical heart valves or other implanted medical devices like a pacemaker, defibrillator, or continuous glucose monitor? No   16. Do you have artificial joints? No   17. Are you allergic to latex? No       Health Care Directive:  Patient does not have a Health Care Directive or Living Will: Advance Directive received and scanned. Click on Code in the patient header to view.    Preoperative Review of :   reviewed - no record of controlled substances prescribed.      Status of Chronic Conditions:  See problem list for active medical problems.  Problems all longstanding and stable, except as noted/documented.  See ROS for pertinent symptoms related to these conditions.      Review of Systems  CONSTITUTIONAL: NEGATIVE for fever, chills, change in weight  INTEGUMENTARY/SKIN: NEGATIVE for worrisome rashes, moles or lesions  EYES: NEGATIVE for vision changes or irritation  ENT/MOUTH: NEGATIVE for ear, mouth and throat problems  RESP: NEGATIVE for significant cough or SOB  CV:  NEGATIVE for chest pain, palpitations or peripheral edema  GI: NEGATIVE for nausea, abdominal pain, heartburn, or change in bowel habits  : NEGATIVE for frequency, dysuria, or hematuria  MUSCULOSKELETAL: NEGATIVE for significant arthralgias or myalgia  NEURO: NEGATIVE for weakness, dizziness or paresthesias  ENDOCRINE: NEGATIVE for temperature intolerance, skin/hair changes  HEME: NEGATIVE for bleeding problems  PSYCHIATRIC: NEGATIVE for changes in mood or affect    Patient Active Problem List    Diagnosis Date Noted     Urinary retention 06/25/2022     Priority: Medium     Hyponatremia 06/25/2022     Priority: Medium     Primary osteoarthritis involving multiple joints      Priority: Medium     Prediabetes 01/01/2021     Priority: Medium     Hypertension goal BP (blood pressure) < 140/90      Priority: Medium     Psoriasis      Priority: Medium     Hyperlipidemia LDL goal <100      Priority: Medium      Past Medical History:   Diagnosis Date     Hyperlipidemia LDL goal <100      Hypertension goal BP (blood pressure) < 140/90      Prediabetes 01/01/2021     Primary osteoarthritis involving multiple joints      Psoriasis      Past Surgical History:   Procedure Laterality Date     CYSTOSCOPY       DAVINCI XI HERNIORRHAPHY INGUINAL Right 06/24/2022    Procedure: Xi Robotic Assisted HERNIORRHAPHY, INGUINAL, LAPAROSCOPIC, WITH MESH - Right;  Surgeon: Charles Elliott MD;  Location: [facility] OR     SURGICAL HISTORY OF -  Right 1982    right inguinal hernia     SURGICAL HISTORY OF -   1985    cholecystectomy     SURGICAL HISTORY OF -  Left 1964    left testicle removed secondary to injury     SURGICAL HISTORY OF -  Left 01/2021    Left eye - Dr Lees - Macular Hole and Epiretinal Membrane     SURGICAL HISTORY OF -   1997    Right Inguinal hernia     SURGICAL HISTORY OF -  Left 07/2021    Left IOL - Dr Becerra     SURGICAL HISTORY OF -  Right 06/2022    Rt inguinal hernia repair - Dr Saravia     TESTICLE SURGERY        VASECTOMY       Current Outpatient Medications   Medication Sig Dispense Refill     atorvastatin (LIPITOR) 80 MG tablet Take 1 tablet (80 mg) by mouth daily 90 tablet 3     cholecalciferol (VITAMIN D3) 25 mcg (1000 units) capsule Take 1 capsule by mouth daily       fenofibrate (TRIGLIDE/LOFIBRA) 160 MG tablet Take 1 tablet (160 mg) by mouth daily 90 tablet 3     glucosamine-chondroitin 500-400 MG CAPS per capsule Take 1 capsule by mouth daily       lisinopril-hydrochlorothiazide (ZESTORETIC) 20-12.5 MG tablet Take 1 tablet by mouth daily Hold until further direction from PCP or until 6/29 90 tablet 3     melatonin 5 MG tablet Take 5 mg by mouth nightly as needed for sleep       metoprolol succinate ER (TOPROL XL) 50 MG 24 hr tablet Take 1 tablet (50 mg) by mouth daily 90 tablet 3     tamsulosin (FLOMAX) 0.4 MG capsule Take 1 capsule (0.4 mg) by mouth daily 30 capsule 0     triamcinolone (KENALOG) 0.5 % external ointment APPLY TOPICALLY TWO TIMES ADAY (Patient taking differently: Apply topically 2 times daily as needed) 120 g 1       Allergies   Allergen Reactions     Codeine      Tachycardia     Demerol [Meperidine] Rash        Social History     Tobacco Use     Smoking status: Never Smoker     Smokeless tobacco: Never Used   Substance Use Topics     Alcohol use: Not Currently     Family History   Problem Relation Age of Onset     Hypertension Mother      Parkinsonism Father      Parkinsonism Brother      Dementia Maternal Grandfather      Cancer Brother         eye cancer, blastomycosis     Cerebrovascular Disease Brother         bike accident in MVA     History   Drug Use Unknown         Objective     /70   Pulse 105   Temp 98.1  F (36.7  C) (Tympanic)   Resp 12   Ht 1.829 m (6')   Wt 86.2 kg (190 lb)   SpO2 99%   BMI 25.77 kg/m      Physical Exam    GENERAL APPEARANCE: healthy, alert and no distress     EYES: EOMI,  PERRL     HENT: ear canals and TM's normal and nose and mouth without ulcers or  lesions     NECK: no adenopathy, no asymmetry, masses, or scars and thyroid normal to palpation     RESP: lungs clear to auscultation - no rales, rhonchi or wheezes     CV: regular rates and rhythm, normal S1 S2, no S3 or S4 and no murmur, click or rub     MS: extremities normal- no gross deformities noted, no evidence of inflammation in joints, FROM in all extremities.     SKIN: no suspicious lesions or rashes     NEURO: Normal strength and tone, sensory exam grossly normal, mentation intact and speech normal     PSYCH: mentation appears normal. and affect normal/bright     LYMPHATICS: No cervical adenopathy    Recent Labs   Lab Test 06/25/22  1154 06/25/22  0752 06/24/22  2246 05/25/22  1008 05/25/22  1008 07/05/21  1031   HGB  --   --  13.9  --  14.7  --    PLT  --   --  200  --  261  --     139 124*   < > 139 136   POTASSIUM  --  4.5 4.9   < > 4.3 4.3   CR  --  1.48* 1.41*   < > 1.45* 1.56*   A1C  --   --   --   --  5.8* 5.6    < > = values in this interval not displayed.        Diagnostics:  No labs were ordered during this visit.   No EKG this visit, completed in the last 90 days.    Revised Cardiac Risk Index (RCRI):  The patient has the following serious cardiovascular risks for perioperative complications:   - No serious cardiac risks = 0 points     RCRI Interpretation: 0 points: Class I (very low risk - 0.4% complication rate)           Signed Electronically by: Addy Campos DO  Copy of this evaluation report is provided to requesting physician.     Patient's Father was in the ER all night with his son.

## 2024-06-17 ENCOUNTER — OFFICE VISIT (OUTPATIENT)
Dept: FAMILY MEDICINE | Facility: CLINIC | Age: 79
End: 2024-06-17
Attending: FAMILY MEDICINE
Payer: COMMERCIAL

## 2024-06-17 VITALS
SYSTOLIC BLOOD PRESSURE: 124 MMHG | RESPIRATION RATE: 18 BRPM | HEIGHT: 72 IN | WEIGHT: 187.9 LBS | DIASTOLIC BLOOD PRESSURE: 78 MMHG | BODY MASS INDEX: 25.45 KG/M2 | OXYGEN SATURATION: 96 % | HEART RATE: 88 BPM | TEMPERATURE: 97.9 F

## 2024-06-17 DIAGNOSIS — Z12.11 SCREEN FOR COLON CANCER: ICD-10-CM

## 2024-06-17 DIAGNOSIS — Z00.00 ROUTINE GENERAL MEDICAL EXAMINATION AT A HEALTH CARE FACILITY: ICD-10-CM

## 2024-06-17 DIAGNOSIS — L40.9 PSORIASIS: ICD-10-CM

## 2024-06-17 DIAGNOSIS — M15.0 PRIMARY OSTEOARTHRITIS INVOLVING MULTIPLE JOINTS: ICD-10-CM

## 2024-06-17 DIAGNOSIS — Z00.00 MEDICARE ANNUAL WELLNESS VISIT, SUBSEQUENT: ICD-10-CM

## 2024-06-17 DIAGNOSIS — E78.5 HYPERLIPIDEMIA LDL GOAL <100: ICD-10-CM

## 2024-06-17 DIAGNOSIS — N18.32 STAGE 3B CHRONIC KIDNEY DISEASE (H): ICD-10-CM

## 2024-06-17 DIAGNOSIS — R73.03 PREDIABETES: ICD-10-CM

## 2024-06-17 DIAGNOSIS — I10 HYPERTENSION GOAL BP (BLOOD PRESSURE) < 140/90: ICD-10-CM

## 2024-06-17 DIAGNOSIS — Z12.5 SCREENING FOR PROSTATE CANCER: ICD-10-CM

## 2024-06-17 DIAGNOSIS — R33.9 URINARY RETENTION: ICD-10-CM

## 2024-06-17 DIAGNOSIS — Z51.81 MEDICATION MONITORING ENCOUNTER: ICD-10-CM

## 2024-06-17 LAB
ALBUMIN SERPL BCG-MCNC: 4.2 G/DL (ref 3.5–5.2)
ALBUMIN UR-MCNC: NEGATIVE MG/DL
ALP SERPL-CCNC: 56 U/L (ref 40–150)
ALT SERPL W P-5'-P-CCNC: 20 U/L (ref 0–70)
ANION GAP SERPL CALCULATED.3IONS-SCNC: 10 MMOL/L (ref 7–15)
APPEARANCE UR: CLEAR
AST SERPL W P-5'-P-CCNC: 32 U/L (ref 0–45)
BILIRUB SERPL-MCNC: 0.8 MG/DL
BILIRUB UR QL STRIP: NEGATIVE
BUN SERPL-MCNC: 32.3 MG/DL (ref 8–23)
CALCIUM SERPL-MCNC: 10 MG/DL (ref 8.8–10.2)
CHLORIDE SERPL-SCNC: 101 MMOL/L (ref 98–107)
CHOLEST SERPL-MCNC: 145 MG/DL
CK SERPL-CCNC: 127 U/L (ref 39–308)
COLOR UR AUTO: YELLOW
CREAT SERPL-MCNC: 1.79 MG/DL (ref 0.67–1.17)
CREAT UR-MCNC: 94.2 MG/DL
DEPRECATED HCO3 PLAS-SCNC: 26 MMOL/L (ref 22–29)
EGFRCR SERPLBLD CKD-EPI 2021: 38 ML/MIN/1.73M2
ERYTHROCYTE [DISTWIDTH] IN BLOOD BY AUTOMATED COUNT: 13 % (ref 10–15)
FASTING STATUS PATIENT QL REPORTED: YES
FASTING STATUS PATIENT QL REPORTED: YES
GLUCOSE SERPL-MCNC: 110 MG/DL (ref 70–99)
GLUCOSE UR STRIP-MCNC: NEGATIVE MG/DL
HBA1C MFR BLD: 5.2 % (ref 0–5.6)
HCT VFR BLD AUTO: 42.8 % (ref 40–53)
HDLC SERPL-MCNC: 39 MG/DL
HGB BLD-MCNC: 14.3 G/DL (ref 13.3–17.7)
HGB UR QL STRIP: NEGATIVE
KETONES UR STRIP-MCNC: NEGATIVE MG/DL
LDLC SERPL CALC-MCNC: 84 MG/DL
LEUKOCYTE ESTERASE UR QL STRIP: NEGATIVE
MCH RBC QN AUTO: 30.8 PG (ref 26.5–33)
MCHC RBC AUTO-ENTMCNC: 33.4 G/DL (ref 31.5–36.5)
MCV RBC AUTO: 92 FL (ref 78–100)
MICROALBUMIN UR-MCNC: <12 MG/L
MICROALBUMIN/CREAT UR: NORMAL MG/G{CREAT}
NITRATE UR QL: NEGATIVE
NONHDLC SERPL-MCNC: 106 MG/DL
PH UR STRIP: 7 [PH] (ref 5–7)
PLATELET # BLD AUTO: 240 10E3/UL (ref 150–450)
POTASSIUM SERPL-SCNC: 4.9 MMOL/L (ref 3.4–5.3)
PROT SERPL-MCNC: 7.4 G/DL (ref 6.4–8.3)
PSA SERPL DL<=0.01 NG/ML-MCNC: 3.59 NG/ML (ref 0–6.5)
RBC # BLD AUTO: 4.64 10E6/UL (ref 4.4–5.9)
SODIUM SERPL-SCNC: 137 MMOL/L (ref 135–145)
SP GR UR STRIP: 1.01 (ref 1–1.03)
TRIGL SERPL-MCNC: 112 MG/DL
TSH SERPL DL<=0.005 MIU/L-ACNC: 1.82 UIU/ML (ref 0.3–4.2)
UROBILINOGEN UR STRIP-ACNC: 0.2 E.U./DL
WBC # BLD AUTO: 5.9 10E3/UL (ref 4–11)

## 2024-06-17 PROCEDURE — G0103 PSA SCREENING: HCPCS | Performed by: FAMILY MEDICINE

## 2024-06-17 PROCEDURE — 81003 URINALYSIS AUTO W/O SCOPE: CPT | Performed by: FAMILY MEDICINE

## 2024-06-17 PROCEDURE — 82550 ASSAY OF CK (CPK): CPT | Performed by: FAMILY MEDICINE

## 2024-06-17 PROCEDURE — 36415 COLL VENOUS BLD VENIPUNCTURE: CPT | Performed by: FAMILY MEDICINE

## 2024-06-17 PROCEDURE — 80053 COMPREHEN METABOLIC PANEL: CPT | Performed by: FAMILY MEDICINE

## 2024-06-17 PROCEDURE — G0439 PPPS, SUBSEQ VISIT: HCPCS | Performed by: FAMILY MEDICINE

## 2024-06-17 PROCEDURE — 99214 OFFICE O/P EST MOD 30 MIN: CPT | Mod: 25 | Performed by: FAMILY MEDICINE

## 2024-06-17 PROCEDURE — 82570 ASSAY OF URINE CREATININE: CPT | Performed by: FAMILY MEDICINE

## 2024-06-17 PROCEDURE — 85027 COMPLETE CBC AUTOMATED: CPT | Performed by: FAMILY MEDICINE

## 2024-06-17 PROCEDURE — 83036 HEMOGLOBIN GLYCOSYLATED A1C: CPT | Performed by: FAMILY MEDICINE

## 2024-06-17 PROCEDURE — 80061 LIPID PANEL: CPT | Performed by: FAMILY MEDICINE

## 2024-06-17 PROCEDURE — 84443 ASSAY THYROID STIM HORMONE: CPT | Performed by: FAMILY MEDICINE

## 2024-06-17 PROCEDURE — 82043 UR ALBUMIN QUANTITATIVE: CPT | Performed by: FAMILY MEDICINE

## 2024-06-17 RX ORDER — LISINOPRIL AND HYDROCHLOROTHIAZIDE 12.5; 2 MG/1; MG/1
1 TABLET ORAL DAILY
Qty: 90 TABLET | Refills: 3 | Status: SHIPPED | OUTPATIENT
Start: 2024-06-17

## 2024-06-17 RX ORDER — ATORVASTATIN CALCIUM 80 MG/1
80 TABLET, FILM COATED ORAL DAILY
Qty: 90 TABLET | Refills: 3 | Status: SHIPPED | OUTPATIENT
Start: 2024-06-17

## 2024-06-17 RX ORDER — TRIAMCINOLONE ACETONIDE 5 MG/G
OINTMENT TOPICAL
Qty: 120 G | Refills: 3 | Status: SHIPPED | OUTPATIENT
Start: 2024-06-17

## 2024-06-17 RX ORDER — METOPROLOL SUCCINATE 50 MG/1
50 TABLET, EXTENDED RELEASE ORAL DAILY
Qty: 90 TABLET | Refills: 3 | Status: SHIPPED | OUTPATIENT
Start: 2024-06-17

## 2024-06-17 RX ORDER — FENOFIBRATE 160 MG/1
160 TABLET ORAL DAILY
Qty: 90 TABLET | Refills: 3 | Status: SHIPPED | OUTPATIENT
Start: 2024-06-17

## 2024-06-17 RX ORDER — TAMSULOSIN HYDROCHLORIDE 0.4 MG/1
0.4 CAPSULE ORAL DAILY
Qty: 90 CAPSULE | Refills: 3 | Status: SHIPPED | OUTPATIENT
Start: 2024-06-17

## 2024-06-17 NOTE — LETTER
[facility]  [address]   [place] [address]  [phone]                    June 25, 2024    Terry Verduzco  [address] [place] [facility] [address]      Dear Terry,    Here is a summary of your recent test results:    FIT test (screening test for colon cancer) was normal.     We advise:     FIT annually   Consider colonoscopy.     Your test results are enclosed.               Thank you very much for trusting [facility].     Healthy regards,          Jalen Daly M.D.        Results for orders placed or performed in visit on 06/17/24   Comprehensive metabolic panel     Status: Abnormal   Result Value Ref Range    Sodium 137 135 - 145 mmol/L    Potassium 4.9 3.4 - 5.3 mmol/L    Carbon Dioxide (CO2) 26 22 - 29 mmol/L    Anion Gap 10 7 - 15 mmol/L    Urea Nitrogen 32.3 (H) 8.0 - 23.0 mg/dL    Creatinine 1.79 (H) 0.67 - 1.17 mg/dL    GFR Estimate 38 (L) >60 mL/min/1.73m2    Calcium 10.0 8.8 - 10.2 mg/dL    Chloride 101 98 - 107 mmol/L    Glucose 110 (H) 70 - 99 mg/dL    Alkaline Phosphatase 56 40 - 150 U/L    AST 32 0 - 45 U/L    ALT 20 0 - 70 U/L    Protein Total 7.4 6.4 - 8.3 g/dL    Albumin 4.2 3.5 - 5.2 g/dL    Bilirubin Total 0.8 <=1.2 mg/dL    Patient Fasting > 8hrs? Yes    Lipid panel reflex to direct LDL Fasting     Status: Abnormal   Result Value Ref Range    Cholesterol 145 <200 mg/dL    Triglycerides 112 <150 mg/dL    Direct Measure HDL 39 (L) >=40 mg/dL    LDL Cholesterol Calculated 84 <=100 mg/dL    Non HDL Cholesterol 106 <130 mg/dL    Patient Fasting > 8hrs? Yes     Narrative    Cholesterol  Desirable:  <200 mg/dL    Triglycerides  Normal:  Less than 150 mg/dL  Borderline High:  150-199 mg/dL  High:  200-499 mg/dL  Very High:  Greater than or equal to 500 mg/dL    Direct Measure HDL  Female:  Greater than or equal to 50 mg/dL   Male:  Greater than or equal to 40 mg/dL    LDL Cholesterol  Desirable:  <100mg/dL  Above Desirable:  100-129  mg/dL   Borderline High:  130-159 mg/dL   High:  160-189 mg/dL   Very High:  >= 190 mg/dL    Non HDL Cholesterol  Desirable:  130 mg/dL  Above Desirable:  130-159 mg/dL  Borderline High:  160-189 mg/dL  High:  190-219 mg/dL  Very High:  Greater than or equal to 220 mg/dL   CBC with platelets     Status: Normal   Result Value Ref Range    WBC Count 5.9 4.0 - 11.0 10e3/uL    RBC Count 4.64 4.40 - 5.90 10e6/uL    Hemoglobin 14.3 13.3 - 17.7 g/dL    Hematocrit 42.8 40.0 - 53.0 %    MCV 92 78 - 100 fL    MCH 30.8 26.5 - 33.0 pg    MCHC 33.4 31.5 - 36.5 g/dL    RDW 13.0 10.0 - 15.0 %    Platelet Count 240 150 - 450 10e3/uL   CK total     Status: Normal   Result Value Ref Range     39 - 308 U/L   UA Macroscopic with reflex to Microscopic and Culture     Status: Normal    Specimen: Urine, Midstream   Result Value Ref Range    Color Urine Yellow Colorless, Straw, Light Yellow, Yellow    Appearance Urine Clear Clear    Glucose Urine Negative Negative mg/dL    Bilirubin Urine Negative Negative    Ketones Urine Negative Negative mg/dL    Specific Gravity Urine 1.015 1.003 - 1.035    Blood Urine Negative Negative    pH Urine 7.0 5.0 - 7.0    Protein Albumin Urine Negative Negative mg/dL    Urobilinogen Urine 0.2 0.2, 1.0 E.U./dL    Nitrite Urine Negative Negative    Leukocyte Esterase Urine Negative Negative    Narrative    Microscopic not indicated   Albumin Random Urine Quantitative with Creat Ratio     Status: None   Result Value Ref Range    Creatinine Urine mg/dL 94.2 mg/dL    Albumin Urine mg/L <12.0 mg/L    Albumin Urine mg/g Cr     TSH with free T4 reflex     Status: Normal   Result Value Ref Range    TSH 1.82 0.30 - 4.20 uIU/mL   Prostate Specific Antigen Screen     Status: Normal   Result Value Ref Range    Prostate Specific Antigen Screen 3.59 0.00 - 6.50 ng/mL    Narrative    This result is obtained using the Roche Elecsys total PSA method on the manda e801 immunoassay analyzer. Results obtained with different  assay methods or kits cannot be used interchangeably.   Fecal colorectal cancer screen FIT     Status: Normal   Result Value Ref Range    Occult Blood Screen FIT Negative Negative   Hemoglobin A1c     Status: Normal   Result Value Ref Range    Hemoglobin A1C 5.2 0.0 - 5.6 %

## 2024-06-17 NOTE — LETTER
June 24, 2024      Ramos Verduzco  [address] [place] [address]        Dear ,    We are writing to inform you of your test results.    Labs are overall stable     Chronic kidney disease   Prediabetes    Low HDL (good cholesterol)     We advise:     Continue current cares.   Balanced low cholesterol diet.   Regular exercise.     Dr Candelario     For additional lab test information, [email or web address] is an excellent reference.       Resulted Orders   Comprehensive metabolic panel   Result Value Ref Range    Sodium 137 135 - 145 mmol/L      Comment:      Reference intervals for this test were updated on 09/26/2023 to more accurately reflect our healthy population. There may be differences in the flagging of prior results with similar values performed with this method. Interpretation of those prior results can be made in the context of the updated reference intervals.     Potassium 4.9 3.4 - 5.3 mmol/L    Carbon Dioxide (CO2) 26 22 - 29 mmol/L    Anion Gap 10 7 - 15 mmol/L    Urea Nitrogen 32.3 (H) 8.0 - 23.0 mg/dL    Creatinine 1.79 (H) 0.67 - 1.17 mg/dL    GFR Estimate 38 (L) >60 mL/min/1.73m2      Comment:      eGFR calculated using 2021 CKD-EPI equation.    Calcium 10.0 8.8 - 10.2 mg/dL    Chloride 101 98 - 107 mmol/L    Glucose 110 (H) 70 - 99 mg/dL    Alkaline Phosphatase 56 40 - 150 U/L    AST 32 0 - 45 U/L      Comment:      Reference intervals for this test were updated on 6/12/2023 to more accurately reflect our healthy population. There may be differences in the flagging of prior results with similar values performed with this method. Interpretation of those prior results can be made in the context of the updated reference intervals.    ALT 20 0 - 70 U/L      Comment:      Reference intervals for this test were updated on 6/12/2023 to more accurately reflect our healthy population. There may be differences in the flagging of prior results with similar values performed with this  method. Interpretation of those prior results can be made in the context of the updated reference intervals.      Protein Total 7.4 6.4 - 8.3 g/dL    Albumin 4.2 3.5 - 5.2 g/dL    Bilirubin Total 0.8 <=1.2 mg/dL    Patient Fasting > 8hrs? Yes    Lipid panel reflex to direct LDL Fasting   Result Value Ref Range    Cholesterol 145 <200 mg/dL    Triglycerides 112 <150 mg/dL    Direct Measure HDL 39 (L) >=40 mg/dL    LDL Cholesterol Calculated 84 <=100 mg/dL    Non HDL Cholesterol 106 <130 mg/dL    Patient Fasting > 8hrs? Yes     Narrative    Cholesterol  Desirable:  <200 mg/dL    Triglycerides  Normal:  Less than 150 mg/dL  Borderline High:  150-199 mg/dL  High:  200-499 mg/dL  Very High:  Greater than or equal to 500 mg/dL    Direct Measure HDL  Female:  Greater than or equal to 50 mg/dL   Male:  Greater than or equal to 40 mg/dL    LDL Cholesterol  Desirable:  <100mg/dL  Above Desirable:  100-129 mg/dL   Borderline High:  130-159 mg/dL   High:  160-189 mg/dL   Very High:  >= 190 mg/dL    Non HDL Cholesterol  Desirable:  130 mg/dL  Above Desirable:  130-159 mg/dL  Borderline High:  160-189 mg/dL  High:  190-219 mg/dL  Very High:  Greater than or equal to 220 mg/dL   CBC with platelets   Result Value Ref Range    WBC Count 5.9 4.0 - 11.0 10e3/uL    RBC Count 4.64 4.40 - 5.90 10e6/uL    Hemoglobin 14.3 13.3 - 17.7 g/dL    Hematocrit 42.8 40.0 - 53.0 %    MCV 92 78 - 100 fL    MCH 30.8 26.5 - 33.0 pg    MCHC 33.4 31.5 - 36.5 g/dL    RDW 13.0 10.0 - 15.0 %    Platelet Count 240 150 - 450 10e3/uL   CK total   Result Value Ref Range     39 - 308 U/L   UA Macroscopic with reflex to Microscopic and Culture   Result Value Ref Range    Color Urine Yellow Colorless, Straw, Light Yellow, Yellow    Appearance Urine Clear Clear    Glucose Urine Negative Negative mg/dL    Bilirubin Urine Negative Negative    Ketones Urine Negative Negative mg/dL    Specific Gravity Urine 1.015 1.003 - 1.035    Blood Urine Negative Negative     pH Urine 7.0 5.0 - 7.0    Protein Albumin Urine Negative Negative mg/dL    Urobilinogen Urine 0.2 0.2, 1.0 E.U./dL    Nitrite Urine Negative Negative    Leukocyte Esterase Urine Negative Negative    Narrative    Microscopic not indicated   Albumin Random Urine Quantitative with Creat Ratio   Result Value Ref Range    Creatinine Urine mg/dL 94.2 mg/dL      Comment:      The reference ranges have not been established in urine creatinine. The results should be integrated into the clinical context for interpretation.    Albumin Urine mg/L <12.0 mg/L      Comment:      The reference ranges have not been established in urine albumin. The results should be integrated into the clinical context for interpretation.    Albumin Urine mg/g Cr        Comment:      Unable to calculate, urine albumin and/or urine creatinine is outside detectable limits.  Microalbuminuria is defined as an albumin:creatinine ratio of 17 to 299 for males and 25 to 299 for females. A ratio of albumin:creatinine of 300 or higher is indicative of overt proteinuria.  Due to biologic variability, positive results should be confirmed by a second, first-morning random or 24-hour timed urine specimen. If there is discrepancy, a third specimen is recommended. When 2 out of 3 results are in the microalbuminuria range, this is evidence for incipient nephropathy and warrants increased efforts at glucose control, blood pressure control, and institution of therapy with an angiotensin-converting-enzyme (ACE) inhibitor (if the patient can tolerate it).     TSH with free T4 reflex   Result Value Ref Range    TSH 1.82 0.30 - 4.20 uIU/mL   Prostate Specific Antigen Screen   Result Value Ref Range    Prostate Specific Antigen Screen 3.59 0.00 - 6.50 ng/mL    Narrative    This result is obtained using the Roche Elecsys total PSA method on the manda e801 immunoassay analyzer. Results obtained with different assay methods or kits cannot be used interchangeably.   Hemoglobin  A1c   Result Value Ref Range    Hemoglobin A1C 5.2 0.0 - 5.6 %      Comment:      Normal <5.7%   Prediabetes 5.7-6.4%    Diabetes 6.5% or higher     Note: Adopted from ADA consensus guidelines.       If you have any questions or concerns, please call the clinic at the number listed above.       Sincerely,            Jalen Daly M.D.

## 2024-06-17 NOTE — PROGRESS NOTES
Preventive Care Visit  [facility] [place]  Jalen Daly MD, Family Medicine  Jun 17, 2024      Assessment & Plan     Routine general medical examination at a health care facility    - PRIMARY CARE FOLLOW-UP SCHEDULING  - Comprehensive metabolic panel  - Lipid panel reflex to direct LDL Fasting  - CBC with platelets  - CK total  - UA Macroscopic with reflex to Microscopic and Culture  - Albumin Random Urine Quantitative with Creat Ratio  - TSH with free T4 reflex  - Prostate Specific Antigen Screen  - Fecal colorectal cancer screen FIT  - Hemoglobin A1c  - REVIEW OF HEALTH MAINTENANCE PROTOCOL ORDERS  - Comprehensive metabolic panel  - Lipid panel reflex to direct LDL Fasting  - CBC with platelets  - CK total  - UA Macroscopic with reflex to Microscopic and Culture  - Albumin Random Urine Quantitative with Creat Ratio  - TSH with free T4 reflex  - Prostate Specific Antigen Screen  - Fecal colorectal cancer screen FIT  - Hemoglobin A1c  - PRIMARY CARE FOLLOW-UP SCHEDULING    Medicare annual wellness visit, subsequent    - PRIMARY CARE FOLLOW-UP SCHEDULING  - Comprehensive metabolic panel  - Lipid panel reflex to direct LDL Fasting  - CBC with platelets  - CK total  - UA Macroscopic with reflex to Microscopic and Culture  - Albumin Random Urine Quantitative with Creat Ratio  - TSH with free T4 reflex  - Prostate Specific Antigen Screen  - Fecal colorectal cancer screen FIT  - Hemoglobin A1c  - REVIEW OF HEALTH MAINTENANCE PROTOCOL ORDERS  - Comprehensive metabolic panel  - Lipid panel reflex to direct LDL Fasting  - CBC with platelets  - CK total  - UA Macroscopic with reflex to Microscopic and Culture  - Albumin Random Urine Quantitative with Creat Ratio  - TSH with free T4 reflex  - Prostate Specific Antigen Screen  - Fecal colorectal cancer screen FIT  - Hemoglobin A1c  - PRIMARY CARE FOLLOW-UP SCHEDULING    Prediabetes    - PRIMARY CARE FOLLOW-UP SCHEDULING  - Comprehensive metabolic panel  - TSH  with free T4 reflex  - Hemoglobin A1c  - REVIEW OF HEALTH MAINTENANCE PROTOCOL ORDERS  - Comprehensive metabolic panel  - TSH with free T4 reflex  - Hemoglobin A1c  - PRIMARY CARE FOLLOW-UP SCHEDULING  - atorvastatin (LIPITOR) 80 MG tablet  Dispense: 90 tablet; Refill: 3  - fenofibrate (TRIGLIDE/LOFIBRA) 160 MG tablet  Dispense: 90 tablet; Refill: 3  - lisinopril-hydrochlorothiazide (ZESTORETIC) 20-12.5 MG tablet  Dispense: 90 tablet; Refill: 3  - OFFICE/OUTPT VISIT,EST,LEVL IV    Stage 3b chronic kidney disease (H)    - PRIMARY CARE FOLLOW-UP SCHEDULING  - Comprehensive metabolic panel  - CBC with platelets  - UA Macroscopic with reflex to Microscopic and Culture  - Albumin Random Urine Quantitative with Creat Ratio  - REVIEW OF HEALTH MAINTENANCE PROTOCOL ORDERS  - PRIMARY CARE FOLLOW-UP SCHEDULING  - lisinopril-hydrochlorothiazide (ZESTORETIC) 20-12.5 MG tablet  Dispense: 90 tablet; Refill: 3  - metoprolol succinate ER (TOPROL XL) 50 MG 24 hr tablet  Dispense: 90 tablet; Refill: 3  - OFFICE/OUTPT VISIT,EST,LEVL IV    Hypertension goal BP (blood pressure) < 140/90    - PRIMARY CARE FOLLOW-UP SCHEDULING  - Comprehensive metabolic panel  - UA Macroscopic with reflex to Microscopic and Culture  - Albumin Random Urine Quantitative with Creat Ratio  - TSH with free T4 reflex  - REVIEW OF HEALTH MAINTENANCE PROTOCOL ORDERS  - Comprehensive metabolic panel  - UA Macroscopic with reflex to Microscopic and Culture  - Albumin Random Urine Quantitative with Creat Ratio  - TSH with free T4 reflex  - PRIMARY CARE FOLLOW-UP SCHEDULING  - lisinopril-hydrochlorothiazide (ZESTORETIC) 20-12.5 MG tablet  Dispense: 90 tablet; Refill: 3  - metoprolol succinate ER (TOPROL XL) 50 MG 24 hr tablet  Dispense: 90 tablet; Refill: 3  - OFFICE/OUTPT VISIT,EST,LEVL IV    Hyperlipidemia LDL goal <100    - PRIMARY CARE FOLLOW-UP SCHEDULING  - Comprehensive metabolic panel  - Lipid panel reflex to direct LDL Fasting  - CK total  - TSH with free T4  reflex  - REVIEW OF HEALTH MAINTENANCE PROTOCOL ORDERS  - Comprehensive metabolic panel  - Lipid panel reflex to direct LDL Fasting  - CK total  - TSH with free T4 reflex  - PRIMARY CARE FOLLOW-UP SCHEDULING  - atorvastatin (LIPITOR) 80 MG tablet  Dispense: 90 tablet; Refill: 3  - fenofibrate (TRIGLIDE/LOFIBRA) 160 MG tablet  Dispense: 90 tablet; Refill: 3  - OFFICE/OUTPT VISIT,EST,LEVL IV    Psoriasis    - PRIMARY CARE FOLLOW-UP SCHEDULING  - REVIEW OF HEALTH MAINTENANCE PROTOCOL ORDERS  - PRIMARY CARE FOLLOW-UP SCHEDULING  - triamcinolone (KENALOG) 0.5 % external ointment  Dispense: 120 g; Refill: 3  - OFFICE/OUTPT VISIT,EST,LEVL IV    Primary osteoarthritis involving multiple joints    - PRIMARY CARE FOLLOW-UP SCHEDULING  - REVIEW OF HEALTH MAINTENANCE PROTOCOL ORDERS  - PRIMARY CARE FOLLOW-UP SCHEDULING  - OFFICE/OUTPT VISIT,EST,LEVL IV    Urinary retention    - PRIMARY CARE FOLLOW-UP SCHEDULING  - REVIEW OF HEALTH MAINTENANCE PROTOCOL ORDERS  - PRIMARY CARE FOLLOW-UP SCHEDULING  - tamsulosin (FLOMAX) 0.4 MG capsule  Dispense: 90 capsule; Refill: 3  - OFFICE/OUTPT VISIT,EST,LEVL IV    Screening for prostate cancer    - PRIMARY CARE FOLLOW-UP SCHEDULING  - Prostate Specific Antigen Screen  - REVIEW OF HEALTH MAINTENANCE PROTOCOL ORDERS  - Prostate Specific Antigen Screen  - PRIMARY CARE FOLLOW-UP SCHEDULING  - OFFICE/OUTPT VISIT,EST,LEVL IV    Screen for colon cancer    - PRIMARY CARE FOLLOW-UP SCHEDULING  - Fecal colorectal cancer screen FIT  - REVIEW OF HEALTH MAINTENANCE PROTOCOL ORDERS  - Fecal colorectal cancer screen FIT  - PRIMARY CARE FOLLOW-UP SCHEDULING  - OFFICE/OUTPT VISIT,EST,LEVL IV    Medication monitoring encounter    - PRIMARY CARE FOLLOW-UP SCHEDULING  - Comprehensive metabolic panel  - Lipid panel reflex to direct LDL Fasting  - CBC with platelets  - CK total  - UA Macroscopic with reflex to Microscopic and Culture  - Albumin Random Urine Quantitative with Creat Ratio  - TSH with free T4  "reflex  - Hemoglobin A1c  - REVIEW OF HEALTH MAINTENANCE PROTOCOL ORDERS  - Comprehensive metabolic panel  - Lipid panel reflex to direct LDL Fasting  - CBC with platelets  - CK total  - UA Macroscopic with reflex to Microscopic and Culture  - Albumin Random Urine Quantitative with Creat Ratio  - TSH with free T4 reflex  - Hemoglobin A1c  - PRIMARY CARE FOLLOW-UP SCHEDULING  - OFFICE/OUTPT VISIT,EST,LEVL IV      Patient has been advised of split billing requirements and indicates understanding: Yes      BMI  Estimated body mass index is 25.84 kg/m  as calculated from the following:    Height as of this encounter: 1.816 m (5' 11.5\").    Weight as of this encounter: 85.2 kg (187 lb 14.4 oz).       Counseling  Appropriate preventive services were discussed with this patient, including applicable screening as appropriate for fall prevention, nutrition, physical activity, Tobacco-use cessation, weight loss and cognition.  Checklist reviewing preventive services available has been given to the patient.  Reviewed patient's diet, addressing concerns and/or questions.   He is at risk for lack of exercise and has been provided with information to increase physical activity for the benefit of his well-being.     Regular exercise    Plan:    1) Medications: reviewed, refilled    2) Labs: pending    3) Immunizations: reviewed    4) Imaging/Diagnostics: NA    5) Consults: NA    32 minutes spent by myself on the date of the encounter doing chart review, history and exam, documentation and further activities per the note.    Subjective   Ramos is a 79 year old, presenting for the followin/17/2024    10:49 AM   Additional Questions   Roomed by Piper         Health Care Directive  Patient does not have a Health Care Directive or Living Will: Discussed advance care planning with patient; information given to patient to review.    Prediabetes    Lab Results   Component Value Date    A1C 5.2 2024    A1C 5.6 2023    A1C " 5.5 11/07/2022    A1C 5.8 05/25/2022    A1C 5.6 07/05/2021    A1C 6.0 03/03/2021    A1C 5.9 12/29/2020     Hyperlipidemia Follow-Up    Are you regularly taking any medication or supplement to lower your cholesterol?   Yes- Lipitor  Are you having muscle aches or other side effects that you think could be caused by your cholesterol lowering medication?  No    Recent Labs   Lab Test 06/13/23  0912 11/07/22  1052   CHOL 183 175   HDL 39* 46   * 101*   TRIG 148 142     CKD 3B / Hypertension Follow-up - Dr Candelario    Do you check your blood pressure regularly outside of the clinic? Yes   Are you following a low salt diet? Yes  Are your blood pressures ever more than 140 on the top number (systolic) OR more   than 90 on the bottom number (diastolic), for example 140/90? No  Do you take any over the counter pain medicine?: No    BP Readings from Last 3 Encounters:   06/17/24 124/78   03/13/24 129/79   10/16/23 117/80     Creatinine   Date Value Ref Range Status   03/13/2024 1.64 (H) 0.67 - 1.17 mg/dL Final   07/05/2021 1.56 (H) 0.66 - 1.25 mg/dL Final     GFR Estimate   Date Value Ref Range Status   03/13/2024 43 (L) >60 mL/min/1.73m2 Final   07/05/2021 43 (L) >60 mL/min/[1.73_m2] Final     Comment:     Non  GFR Calc  Starting 12/18/2018, serum creatinine based estimated GFR (eGFR) will be   calculated using the Chronic Kidney Disease Epidemiology Collaboration   (CKD-EPI) equation.       OA - no issues    Psoriasis - much improved - triamcinolone cream        6/12/2024   General Health   How would you rate your overall physical health? Excellent   Feel stress (tense, anxious, or unable to sleep) To some extent - step son - alcohol   (!) STRESS CONCERN      6/12/2024   Nutrition   Diet: Regular (no restrictions)    Low salt         6/12/2024   Exercise   Days per week of moderate/strenous exercise 3 days   Average minutes spent exercising at this level 40 min         6/12/2024   Social Factors    Frequency of gathering with friends or relatives Once a week   Worry food won't last until get money to buy more No   Food not last or not have enough money for food? No   Do you have housing?  Yes   Are you worried about losing your housing? No   Lack of transportation? No   Unable to get utilities (heat,electricity)? No         6/12/2024   Fall Risk   Fallen 2 or more times in the past year? No    No   Trouble with walking or balance? No    No          6/12/2024   Activities of Daily Living- Home Safety   Needs help with the following daily activites None of the above   Safety concerns in the home None of the above         6/12/2024   Dental   Dentist two times every year? Yes         6/12/2024   Hearing Screening   Hearing concerns? None of the above         6/12/2024   Driving Risk Screening   Patient/family members have concerns about driving No         6/12/2024   General Alertness/Fatigue Screening   Have you been more tired than usual lately? No         6/12/2024   Urinary Incontinence Screening   Bothered by leaking urine in past 6 months No         6/12/2024   TB Screening   Were you born outside of the [place]? Yes         Today's PHQ-2 Score:       6/17/2024    10:41 AM   PHQ-2 ( 1999 [employer])   Q1: Little interest or pleasure in doing things 1   Q2: Feeling down, depressed or hopeless 1   PHQ-2 Score 2   Q1: Little interest or pleasure in doing things Several days   Q2: Feeling down, depressed or hopeless Several days   PHQ-2 Score 2           6/12/2024   Substance Use   Alcohol more than 3/day or more than 7/wk Not Applicable   Do you have a current opioid prescription? No   How severe/bad is pain from 1 to 10? 0/10 (No Pain)   Do you use any other substances recreationally? No     Social History     Tobacco Use    Smoking status: Never    Smokeless tobacco: Never   Vaping Use    Vaping status: Never Used   Substance Use Topics    Alcohol use: Not Currently    Drug use: Never         ASCVD Risk    The 10-year ASCVD risk score (Josué WALKER, et al., 2019) is: 33.3%    Values used to calculate the score:      Age: 79 years      Sex: Male      Is Non- : No      Diabetic: No      Tobacco smoker: No      Systolic Blood Pressure: 124 mmHg      Is BP treated: Yes      HDL Cholesterol: 39 mg/dL      Total Cholesterol: 183 mg/dL    Fracture Risk Assessment Tool  Link to Frax Calculator  Use the information below to complete the Frax calculator  : 1945  Sex: male  Weight (kg): 85.2 kg (actual weight)  Height (cm): 181.6 cm  Previous Fragility Fracture:  No  History of parent with fractured hip:  No  Current Smoking:  No  Patient has been on glucocorticoids for more than 3 months (5mg/day or more): No  Rheumatoid Arthritis on Problem List:  No  Secondary Osteoporosis on Problem List:  No  Consumes 3 or more units of alcohol per day: No  Femoral Neck BMD (g/cm2)            Reviewed and updated as needed this visit by Provider                  Current providers sharing in care for this patient include:  Patient Care Team:  Jalen Daly MD as PCP - General (Family Medicine)  Jalen Daly MD as Assigned PCP  Jesus Candelario MD as MD (Internal Medicine)  Jesus Candelario MD as Assigned Nephrology Provider    The following health maintenance items are reviewed in Epic and correct as of today:  Health Maintenance   Topic Date Due    ZOSTER IMMUNIZATION (1 of 2) Never done    RSV VACCINE (Pregnancy & 60+) (1 - 1-dose 60+ series) Never done    COVID-19 Vaccine ( season) 2024    LIPID  2024    BMP  2025    MICROALBUMIN  2025    MEDICARE ANNUAL WELLNESS VISIT  2025    ANNUAL REVIEW OF HM ORDERS  2025    FALL RISK ASSESSMENT  2025    HEMOGLOBIN  2025    GLUCOSE  2027    ADVANCE CARE PLANNING  2029    DTAP/TDAP/TD IMMUNIZATION (2 - Td or Tdap) 2032    HEPATITIS C SCREENING  Completed    PHQ-2 (once per calendar year)   Completed    INFLUENZA VACCINE  Completed    Pneumococcal Vaccine: 65+ Years  Completed    URINALYSIS  Completed    IPV IMMUNIZATION  Aged Out    HPV IMMUNIZATION  Aged Out    MENINGITIS IMMUNIZATION  Aged Out    RSV MONOCLONAL ANTIBODY  Aged Out    COLORECTAL CANCER SCREENING  Discontinued     Patient Active Problem List   Diagnosis    Hypertension goal BP (blood pressure) < 140/90    Psoriasis    Hyperlipidemia LDL goal <100    Prediabetes    Primary osteoarthritis involving multiple joints    Urinary retention    Hyponatremia    Stage 3b chronic kidney disease (H)       Past Medical History:   Diagnosis Date    CKD (chronic kidney disease) stage 3, GFR 30-59 ml/min (H)     Dr Candelario - Stage 3B    Hyperlipidemia LDL goal <100     Hypertension goal BP (blood pressure) < 140/90     Prediabetes 01/01/2021    Primary osteoarthritis involving multiple joints     Psoriasis     Urinary retention 06/2022    post op       Past Surgical History:   Procedure Laterality Date    CYSTOSCOPY  07/2022    urinary retention    DAVINCI XI HERNIORRHAPHY INGUINAL Right 06/24/2022    Procedure: Xi Robotic Assisted HERNIORRHAPHY, INGUINAL, LAPAROSCOPIC, WITH MESH - Right;  Surgeon: Charles Elliott MD;  Location: [facility] OR    LAPAROSCOPIC APPENDECTOMY N/A 07/20/2022    Procedure: Laparoscopic appendectomy;  Surgeon: Charles Elliott MD;  Location: [facility] OR    SURGICAL HISTORY OF -  Right 1982    right inguinal hernia    SURGICAL HISTORY OF -   1985    cholecystectomy    SURGICAL HISTORY OF -  Left 1964    left testicle removed secondary to injury    SURGICAL HISTORY OF -  Left 01/2021    Left eye - Dr Lees - Macular Hole and Epiretinal Membrane    SURGICAL HISTORY OF -  Left 07/2021    Left IOL - Dr Becerra    VASECTOMY Bilateral 1993       Current Outpatient Medications   Medication Sig Dispense Refill    atorvastatin (LIPITOR) 80 MG tablet Take 1 tablet (80 mg) by mouth daily 90 tablet 3    cholecalciferol (VITAMIN D3) 25 mcg  (1000 units) capsule Take 1 capsule by mouth daily      fenofibrate (TRIGLIDE/LOFIBRA) 160 MG tablet Take 1 tablet (160 mg) by mouth daily 90 tablet 3    glucosamine-chondroitin 500-400 MG CAPS per capsule Take 1 capsule by mouth daily      lisinopril-hydrochlorothiazide (ZESTORETIC) 20-12.5 MG tablet Take 1 tablet by mouth daily 90 tablet 3    melatonin 5 MG tablet Take 5 mg by mouth nightly as needed for sleep      metoprolol succinate ER (TOPROL XL) 50 MG 24 hr tablet Take 1 tablet (50 mg) by mouth daily 90 tablet 3    multivitamin w/minerals (THERA-VIT-M) tablet Take 1 tablet by mouth daily      tamsulosin (FLOMAX) 0.4 MG capsule Take 1 capsule (0.4 mg) by mouth daily 90 capsule 3    triamcinolone (KENALOG) 0.5 % external ointment Apply to psoriasis twice daily 120 g 3       Allergies   Allergen Reactions    Codeine Other (See Comments)     Tachycardia    Demerol [Meperidine] Rash       Family History   Problem Relation Age of Onset    Hypertension Mother     Parkinsonism Father     Parkinsonism Brother     Dementia Maternal Grandfather     Cancer Brother         eye cancer, blastomycosis    Cerebrovascular Disease Brother         bike accident in MVA    Diabetes Paternal Grandfather        Social History     Socioeconomic History    Marital status:      Spouse name: Claritza    Number of children: 5    Years of education: 12    Highest education level: None   Tobacco Use    Smoking status: Never    Smokeless tobacco: Never   Vaping Use    Vaping status: Never Used   Substance and Sexual Activity    Alcohol use: Not Currently    Drug use: Never    Sexual activity: Not Currently     Partners: Female     Birth control/protection: None   Other Topics Concern    Parent/sibling w/ CABG, MI or angioplasty before 65F 55M? No     Social Determinants of Health     Financial Resource Strain: Low Risk  (6/12/2024)    Financial Resource Strain     Within the past 12 months, have you or your family members you live with  been unable to get utilities (heat, electricity) when it was really needed?: No   Food Insecurity: Low Risk  (6/12/2024)    Food Insecurity     Within the past 12 months, did you worry that your food would run out before you got money to buy more?: No     Within the past 12 months, did the food you bought just not last and you didn t have money to get more?: No   Transportation Needs: Low Risk  (6/12/2024)    Transportation Needs     Within the past 12 months, has lack of transportation kept you from medical appointments, getting your medicines, non-medical meetings or appointments, work, or from getting things that you need?: No   Physical Activity: Insufficiently Active (6/12/2024)    Exercise Vital Sign     Days of Exercise per Week: 3 days     Minutes of Exercise per Session: 40 min   Stress: Stress Concern Present (6/12/2024)    [employer] - Occupational Stress Questionnaire     Feeling of Stress : To some extent   Social Connections: Unknown (6/12/2024)    Social Connection and Isolation Panel [NHANES]     Frequency of Social Gatherings with Friends and Family: Once a week   Interpersonal Safety: Low Risk  (6/17/2024)    Interpersonal Safety     Do you feel physically and emotionally safe where you currently live?: Yes     Within the past 12 months, have you been hit, slapped, kicked or otherwise physically hurt by someone?: No     Within the past 12 months, have you been humiliated or emotionally abused in other ways by your partner or ex-partner?: No   Housing Stability: Low Risk  (6/12/2024)    Housing Stability     Do you have housing? : Yes     Are you worried about losing your housing?: No     Colonoscopy:  reviewed, consider  FIT / Cologuard: given  PSA: pending      Review of Systems  CONSTITUTIONAL: NEGATIVE for fever, chills, change in weight  INTEGUMENTARY/SKIN: NEGATIVE for worrisome rashes, moles or lesions  EYES: NEGATIVE for vision changes or irritation  ENT/MOUTH:  "NEGATIVE for ear, mouth and throat problems  RESP: NEGATIVE for significant cough or SOB  CV: NEGATIVE for chest pain, palpitations or peripheral edema  GI: NEGATIVE for nausea, abdominal pain, heartburn, or change in bowel habits  : NEGATIVE for frequency, dysuria, or hematuria  MUSCULOSKELETAL: NEGATIVE for significant arthralgias or myalgia  NEURO: NEGATIVE for weakness, dizziness or paresthesias  ENDOCRINE: NEGATIVE for temperature intolerance, skin/hair changes  HEME: NEGATIVE for bleeding problems  PSYCHIATRIC: NEGATIVE for changes in mood or affect     Objective    Exam  /78   Pulse 88   Temp 97.9  F (36.6  C) (Oral)   Resp 18   Ht 1.816 m (5' 11.5\")   Wt 85.2 kg (187 lb 14.4 oz)   SpO2 96%   BMI 25.84 kg/m     Estimated body mass index is 25.84 kg/m  as calculated from the following:    Height as of this encounter: 1.816 m (5' 11.5\").    Weight as of this encounter: 85.2 kg (187 lb 14.4 oz).    Physical Exam  GENERAL: alert and no distress  EYES: Eyes grossly normal to inspection, PERRL and conjunctivae and sclerae normal  HENT: ear canals and TM's normal, nose and mouth without ulcers or lesions  NECK: no adenopathy, no asymmetry, masses, or scars  RESP: lungs clear to auscultation - no rales, rhonchi or wheezes  CV: regular rate and rhythm, normal S1 S2, no S3 or S4, no murmur, click or rub, no peripheral edema  ABDOMEN: soft, nontender, no hepatosplenomegaly, no masses and bowel sounds normal   (male): pt declines  RECTAL: pt declines  MS: no gross musculoskeletal defects noted, no edema  SKIN: no suspicious lesions or rashes  NEURO: Normal strength and tone, mentation intact and speech normal  PSYCH: mentation appears normal, affect normal/bright        6/17/2024   Mini Cog   Clock Draw Score 2 Normal   3 Item Recall 2 objects recalled   Mini Cog Total Score 4         Signed Electronically by:            Jalen Daly MD, FAAFP     [facility] " [address] [address]  [email or web address]   Office: [phone]  Fax: [phone]

## 2024-06-18 PROCEDURE — 82274 ASSAY TEST FOR BLOOD FECAL: CPT | Performed by: FAMILY MEDICINE

## 2024-06-24 LAB — HEMOCCULT STL QL IA: NEGATIVE

## 2024-12-26 DIAGNOSIS — D63.1 ANEMIA IN STAGE 3A CHRONIC KIDNEY DISEASE (H): Primary | ICD-10-CM

## 2024-12-26 DIAGNOSIS — N18.31 ANEMIA IN STAGE 3A CHRONIC KIDNEY DISEASE (H): Primary | ICD-10-CM

## 2025-01-08 ENCOUNTER — LAB (OUTPATIENT)
Dept: LAB | Facility: CLINIC | Age: 80
End: 2025-01-08
Attending: INTERNAL MEDICINE
Payer: COMMERCIAL

## 2025-01-08 ENCOUNTER — OFFICE VISIT (OUTPATIENT)
Dept: NEPHROLOGY | Facility: CLINIC | Age: 80
End: 2025-01-08
Attending: INTERNAL MEDICINE
Payer: MEDICARE

## 2025-01-08 ENCOUNTER — MYC MEDICAL ADVICE (OUTPATIENT)
Dept: FAMILY MEDICINE | Facility: CLINIC | Age: 80
End: 2025-01-08

## 2025-01-08 VITALS
SYSTOLIC BLOOD PRESSURE: 138 MMHG | HEART RATE: 71 BPM | HEIGHT: 71 IN | OXYGEN SATURATION: 97 % | TEMPERATURE: 98 F | BODY MASS INDEX: 27.19 KG/M2 | WEIGHT: 194.2 LBS | DIASTOLIC BLOOD PRESSURE: 86 MMHG | RESPIRATION RATE: 16 BRPM

## 2025-01-08 DIAGNOSIS — N18.32 CHRONIC KIDNEY DISEASE, STAGE 3B (H): ICD-10-CM

## 2025-01-08 DIAGNOSIS — D63.1 ANEMIA IN STAGE 3A CHRONIC KIDNEY DISEASE (H): ICD-10-CM

## 2025-01-08 DIAGNOSIS — N18.31 ANEMIA IN STAGE 3A CHRONIC KIDNEY DISEASE (H): ICD-10-CM

## 2025-01-08 DIAGNOSIS — E83.39 HYPOPHOSPHATEMIA: Primary | ICD-10-CM

## 2025-01-08 LAB
ALBUMIN MFR UR ELPH: <6 MG/DL
ALBUMIN SERPL BCG-MCNC: 4.1 G/DL (ref 3.5–5.2)
ALBUMIN UR-MCNC: NEGATIVE MG/DL
ANION GAP SERPL CALCULATED.3IONS-SCNC: 8 MMOL/L (ref 7–15)
APPEARANCE UR: CLEAR
BILIRUB UR QL STRIP: NEGATIVE
BUN SERPL-MCNC: 24.1 MG/DL (ref 8–23)
CALCIUM SERPL-MCNC: 9.8 MG/DL (ref 8.8–10.4)
CHLORIDE SERPL-SCNC: 101 MMOL/L (ref 98–107)
COLOR UR AUTO: NORMAL
CREAT SERPL-MCNC: 1.65 MG/DL (ref 0.67–1.17)
CREAT UR-MCNC: 31.5 MG/DL
CREAT UR-MCNC: 32.3 MG/DL
EGFRCR SERPLBLD CKD-EPI 2021: 42 ML/MIN/1.73M2
ERYTHROCYTE [DISTWIDTH] IN BLOOD BY AUTOMATED COUNT: 12.5 % (ref 10–15)
GLUCOSE SERPL-MCNC: 130 MG/DL (ref 70–99)
GLUCOSE UR STRIP-MCNC: NEGATIVE MG/DL
HCO3 SERPL-SCNC: 27 MMOL/L (ref 22–29)
HCT VFR BLD AUTO: 42.1 % (ref 40–53)
HGB BLD-MCNC: 14 G/DL (ref 13.3–17.7)
HGB UR QL STRIP: NEGATIVE
KETONES UR STRIP-MCNC: NEGATIVE MG/DL
LEUKOCYTE ESTERASE UR QL STRIP: NEGATIVE
MCH RBC QN AUTO: 30.8 PG (ref 26.5–33)
MCHC RBC AUTO-ENTMCNC: 33.3 G/DL (ref 31.5–36.5)
MCV RBC AUTO: 93 FL (ref 78–100)
MICROALBUMIN UR-MCNC: <12 MG/L
MICROALBUMIN/CREAT UR: NORMAL MG/G{CREAT}
NITRATE UR QL: NEGATIVE
PH UR STRIP: 6.5 [PH] (ref 5–7)
PHOSPHATE SERPL-MCNC: 2.4 MG/DL (ref 2.5–4.5)
PLATELET # BLD AUTO: 266 10E3/UL (ref 150–450)
POTASSIUM SERPL-SCNC: 4.7 MMOL/L (ref 3.4–5.3)
PROT/CREAT 24H UR: NORMAL MG/G{CREAT}
RBC # BLD AUTO: 4.54 10E6/UL (ref 4.4–5.9)
RBC URINE: <1 /HPF
SODIUM SERPL-SCNC: 136 MMOL/L (ref 135–145)
SP GR UR STRIP: 1.01 (ref 1–1.03)
UROBILINOGEN UR STRIP-MCNC: NORMAL MG/DL
WBC # BLD AUTO: 6.5 10E3/UL (ref 4–11)
WBC URINE: 0 /HPF

## 2025-01-08 PROCEDURE — 81001 URINALYSIS AUTO W/SCOPE: CPT | Performed by: PATHOLOGY

## 2025-01-08 PROCEDURE — 82570 ASSAY OF URINE CREATININE: CPT | Performed by: INTERNAL MEDICINE

## 2025-01-08 PROCEDURE — 82043 UR ALBUMIN QUANTITATIVE: CPT | Performed by: INTERNAL MEDICINE

## 2025-01-08 PROCEDURE — 85027 COMPLETE CBC AUTOMATED: CPT | Performed by: PATHOLOGY

## 2025-01-08 PROCEDURE — 84156 ASSAY OF PROTEIN URINE: CPT | Performed by: PATHOLOGY

## 2025-01-08 PROCEDURE — 36415 COLL VENOUS BLD VENIPUNCTURE: CPT | Performed by: PATHOLOGY

## 2025-01-08 PROCEDURE — 99000 SPECIMEN HANDLING OFFICE-LAB: CPT | Performed by: PATHOLOGY

## 2025-01-08 PROCEDURE — 80069 RENAL FUNCTION PANEL: CPT | Performed by: PATHOLOGY

## 2025-01-08 ASSESSMENT — PAIN SCALES - GENERAL: PAINLEVEL_OUTOF10: NO PAIN (0)

## 2025-01-08 NOTE — PATIENT INSTRUCTIONS
It was a pleasure taking care of you today.  I've included a brief summary of our discussion and care plan from today's visit below.  Please review this information with your primary care provider.     My recommendations are summarized as follows:  -Kidney numbers are stable  -BP is at goal  -Keep same medications    Who do I call with any questions after my visit?  Please be in touch if there are any further questions that arise following today's visit.  There are multiple ways to contact your nephrology care team.       During business hours, you may reach your Nephrology Care Team or schedule or reschedule an appointment or lab at [phone].       If you need to schedule imaging, please call [phone].   To schedule a COVID test, please call [phone].     You can always send a secure message through [employer]. [employer] messages are answered by your nurse or doctor typically within 24-48 hours. Please allow extra time on weekends and holidays.       For urgent/emergent questions after business hours, you may reach the on-call Nephrology Fellow by contacting the [facility]  at [phone].     How will I get the results of any tests ordered?    You will receive all of your results.  If you have signed up for [employer], any tests ordered at your visit will be available to you once resulted on [facility]. Typically the physician reviews them and may or may not make further recommendations. If there are urgent results that require a change in your care plan, your physician or nurse will call you to discuss the next steps. If you are not on [employer], a letter may be generated and mailed to you with your results.

## 2025-01-08 NOTE — PROGRESS NOTES
I was asked to see this patient by Jalen Souza    CC: CINDY    HPI:   I had the pleasure today of seeing Terry Verduzco. She is a 79 year old male  who presents for evaluation of elevated creatinine. He is here with his wife. They live in [place]. Their Grandon is a nurse at [employer].    His medical history is significant for hypertension. His blood pressure is well controlled.  His blood pressure at home is around 110-120/80-90. He also has skin psoriasis but no joint involvement.  His medical history is also significant for dyslipidemia for which he is on atorvastatin and fenofibrate.    Overall, he feels well.  He denies any specific complaint at this point.  He denies any joint pain, skin rash, urinary symptoms, gross hematuria, difficulty urination, urinary tract infection, or renal colic.  He denies any abdominal pain, diarrhea or chest pain.    He is here today for follow-up CKD. His creatinine back in 2003 was 1.3.  It has slightly been up since 2020~ 1.4-1.5 mg/dl. He did not note any changes in blood pressure.  He denies any COVID19 infection. No herbal medications. No NSAIDs, No swelling in his legs.    Interval history:  He continues to do very well.  His energy is excellent.  He is moving to a new place with his wife close to his son in [facility].  He denies any lower extremity edema.  No changes to his medications since his last visit.    Social history: He is .  He has 3 children and 2 stepchildren.  He has several grandchildren and great-grandchildren.  He used to work [place] for the [employer] but currently he is retired.  He is originally from [place]. He speaks Nauruan and swedish.      Allergies   Allergen Reactions    Codeine Other (See Comments)     Tachycardia    Demerol [Meperidine] Rash       Current Outpatient Medications   Medication Sig Dispense Refill    atorvastatin (LIPITOR) 80 MG tablet Take 1 tablet (80 mg) by mouth daily 90 tablet 3    cholecalciferol (VITAMIN  D3) 25 mcg (1000 units) capsule Take 1 capsule by mouth daily      fenofibrate (TRIGLIDE/LOFIBRA) 160 MG tablet Take 1 tablet (160 mg) by mouth daily 90 tablet 3    glucosamine-chondroitin 500-400 MG CAPS per capsule Take 1 capsule by mouth daily      lisinopril-hydrochlorothiazide (ZESTORETIC) 20-12.5 MG tablet Take 1 tablet by mouth daily 90 tablet 3    melatonin 5 MG tablet Take 5 mg by mouth nightly as needed for sleep      metoprolol succinate ER (TOPROL XL) 50 MG 24 hr tablet Take 1 tablet (50 mg) by mouth daily 90 tablet 3    multivitamin w/minerals (THERA-VIT-M) tablet Take 1 tablet by mouth daily      tamsulosin (FLOMAX) 0.4 MG capsule Take 1 capsule (0.4 mg) by mouth daily 90 capsule 3    triamcinolone (KENALOG) 0.5 % external ointment Apply to psoriasis twice daily 120 g 3     Current Facility-Administered Medications   Medication Dose Route Frequency Provider Last Rate Last Admin    cefOXitin (MEFOXIN) 2 g in sodium chloride 0.9 % 100 mL intermittent infusion  2 g Intravenous Pre-Op/Pre-procedure x 1 dose Charles Elliott MD         Past Medical History:   Diagnosis Date    CKD (chronic kidney disease) stage 3, GFR 30-59 ml/min (H)     Dr Candelario - Stage 3B    Hyperlipidemia LDL goal <100     Hypertension goal BP (blood pressure) < 140/90     Prediabetes 01/01/2021    Primary osteoarthritis involving multiple joints     Psoriasis     Urinary retention 06/2022    post op       Past Surgical History:   Procedure Laterality Date    CYSTOSCOPY  07/2022    urinary retention    DAVINCI XI HERNIORRHAPHY INGUINAL Right 06/24/2022    Procedure: Xi Robotic Assisted HERNIORRHAPHY, INGUINAL, LAPAROSCOPIC, WITH MESH - Right;  Surgeon: Charles Elliott MD;  Location: [facility] OR    LAPAROSCOPIC APPENDECTOMY N/A 07/20/2022    Procedure: Laparoscopic appendectomy;  Surgeon: Charles Elliott MD;  Location: [facility] OR    SURGICAL HISTORY OF -  Right 1982    right inguinal hernia    SURGICAL HISTORY OF -   1985     "cholecystectomy    SURGICAL HISTORY OF -  Left 1964    left testicle removed secondary to injury    SURGICAL HISTORY OF -  Left 01/2021    Left eye - Dr Lees - Macular Hole and Epiretinal Membrane    SURGICAL HISTORY OF -  Left 07/2021    Left IOL - Dr Becerra    VASECTOMY Bilateral 1993       Social History     Tobacco Use    Smoking status: Never    Smokeless tobacco: Never   Vaping Use    Vaping status: Never Used   Substance Use Topics    Alcohol use: Not Currently    Drug use: Never       Family History   Problem Relation Age of Onset    Hypertension Mother     Parkinsonism Father     Parkinsonism Brother     Dementia Maternal Grandfather     Cancer Brother         eye cancer, blastomycosis    Cerebrovascular Disease Brother         bike accident in MVA    Diabetes Paternal Grandfather      ROS: A 12 system review of systems was negative other than noted here or above.     Exam:  /86 (BP Location: Right arm, Patient Position: Sitting, Cuff Size: Adult Regular)   Pulse 71   Temp 98  F (36.7  C) (Oral)   Resp 16   Ht 1.816 m (5' 11.5\")   Wt 88.1 kg (194 lb 3.2 oz)   SpO2 97%   BMI 26.71 kg/m    BP Readings from Last 6 Encounters:   01/08/25 138/86   06/17/24 124/78   03/13/24 129/79   10/16/23 117/80   09/13/23 133/83   06/13/23 110/80     GENERAL APPEARANCE: alert and no distress  EYES: PERRL  HENT: mouth without ulcers or lesions  NECK: supple, no adenopathy  RESP: lungs clear to auscultation - no rales, rhonchi or wheezes  CV: regular rhythm, normal rate, no rub   ABDOMEN:  soft, nontender, no HSM or masses and bowel sounds normal  MS: extremities normal- no gross deformities noted, no evidence of inflammation in joints, no muscle tenderness  SKIN: psoriatic lesions over his hands  NEURO: Normal strength and tone, sensory exam grossly normal, mentation intact and speech normal  PSYCH: mentation appears normal. and affect normal/bright  No LE edema    Wt Readings from Last 4 Encounters:   01/08/25 " 88.1 kg (194 lb 3.2 oz)   06/17/24 85.2 kg (187 lb 14.4 oz)   03/13/24 83.5 kg (184 lb)   10/16/23 83 kg (183 lb)     Results: Reviewed in details with the patient    Assessment/Plan:   Problem #1 chronic kidney disease:  Likely secondary to aging, residual from prior CINDY and HTN. Creatinine back in 2003 was 1.3.  It has slightly been up since 2020~ 1.5-1.7 mg/dl. He is tall (6 inches) so his baseline creatinine is rather higher than average and rather underestimates his GFR.  -We will check cystatin C  -Euvolemic on exam  -No evidence of anemia  -No electrolyte disturbances  -No metabolic acidosis    Problem #3 essential hypertension: His blood pressure is well controlled and at goal.     Problem # 4 dyslipidemia: He is on fenofibrate and atorvastatin. LDL and TG at goal.    Problem #5 CKD-MBD: calcium, phos, PTH are normal.    *This dictation was prepared in part using Dragon recognition software.  As a result errors may occur. When identified these transcription errors have been corrected.  While every attempt is made to correct errors during dictation, errors may still exist.     Jesus Candelario MD   [facility]   Department of Medicine   Division of Renal Disease and Hypertension

## 2025-01-08 NOTE — NURSING NOTE
"Chief Complaint   Patient presents with    RECHECK     CKD     Vital signs:  Temp: 98  F (36.7  C) Temp src: Oral BP: 138/86 Pulse: 71   Resp: 16 SpO2: 97 %     Height: 181.6 cm (5' 11.5\") Weight: 88.1 kg (194 lb 3.2 oz)  Estimated body mass index is 26.71 kg/m  as calculated from the following:    Height as of this encounter: 1.816 m (5' 11.5\").    Weight as of this encounter: 88.1 kg (194 lb 3.2 oz).      Karly Powell, [facility]  1/8/2025 1:22 PM    "

## 2025-06-07 ENCOUNTER — NURSE TRIAGE (OUTPATIENT)
Dept: NURSING | Facility: CLINIC | Age: 80
End: 2025-06-07
Payer: COMMERCIAL

## 2025-06-07 DIAGNOSIS — U07.1 INFECTION DUE TO 2019 NOVEL CORONAVIRUS: Primary | ICD-10-CM

## 2025-06-07 NOTE — TELEPHONE ENCOUNTER
COVID Positive/Requesting COVID treatment    Patient is positive for COVID and requesting treatment options.    Date of positive COVID test (PCR or at home)? 6/7/2025  Current COVID symptoms: fever or chills, cough, headache, sore throat, and congestion or runny nose  Date COVID symptoms began: 6/6/2025    Message should be routed to clinic RN pool. Best phone number to use for call back: [phone] ______________________________________________________________    Nurse Triage SBAR    Situation: Covid symptoms    Background: Patient calling. Pt took a covid test this morning - positive.     Assessment: Cough started last night. Dry cough. Sore throat. Headache. Runny nose. Chills last night. Some stiffness to his neck this morning but able to move his neck and touch his chin to his chest. Some chest pain when he was coughing yesterday - none today. He states he is drinking a lot of fluid. Denied any sob or difficulty breathing. Oral temp: 97.5.     Recommendation: Will route to care team for further advisement. Care advice given. Patient verbalizes understanding and agrees with plan of care. Reviewed concerning symptoms and when to call back.    Jayshree Brown RN Nursing Advisor 6/7/2025 11:49 AM     Reason for Disposition   [1] HIGH RISK patient (e.g., weak immune system, age > 64 years, obesity with BMI 30 or higher, pregnant, chronic lung disease) AND [2] COVID symptoms (e.g., cough, fever)  (Exceptions: Already seen by PCP and no new or worsening symptoms.)    Additional Information   Negative: SEVERE difficulty breathing (e.g., struggling for each breath, speaks in single words)   Negative: Difficult to awaken or acting confused (e.g., disoriented, slurred speech)   Negative: Bluish (or gray) lips or face now   Negative: Shock suspected (e.g., cold/pale/clammy skin, too weak to stand, low BP, rapid pulse)   Negative: Sounds like a life-threatening emergency to the triager   Negative: [1] Diagnosed or suspected  COVID-19 AND [2] symptoms lasting 3 or more weeks   Negative: [1] COVID-19 exposure AND [2] no symptoms   Negative: COVID-19 vaccine reaction suspected (e.g., fever, headache, muscle aches) occurring 1 to 3 days after getting vaccine   Negative: COVID-19 vaccine, questions about   Negative: [1] Exposure to someone known to have influenza (flu test positive) AND [2] flu-like symptoms (e.g., cough, runny nose, sore throat, SOB; with or without fever)   Negative: [1] Possible COVID-19 symptoms AND [2] triager concerned about severity of symptoms or other causes   Negative: COVID-19 and breastfeeding, questions about   Negative: SEVERE or constant chest pain or pressure  (Exception: Mild central chest pain, present only when coughing.)   Negative: MODERATE difficulty breathing (e.g., speaks in phrases, SOB even at rest, pulse 100-120)   Negative: [1] Headache AND [2] stiff neck (can't touch chin to chest)   Negative: Oxygen level (e.g., pulse oximetry) 90% or lower   Negative: Chest pain or pressure  (Exception: MILD central chest pain, present only when coughing.)   Negative: [1] Drinking very little AND [2] dehydration suspected (e.g., no urine > 12 hours, very dry mouth, very lightheaded)   Negative: Patient sounds very sick or weak to the triager   Negative: MILD difficulty breathing (e.g., minimal/no SOB at rest, SOB with walking, pulse <100)   Negative: Fever > 103 F (39.4 C)   Negative: [1] Fever > 101 F (38.3 C) AND [2] age > 60 years   Negative: [1] Fever > 100 F (37.8 C) AND [2] bedridden (e.g., CVA, chronic illness, recovering from surgery)   Negative: Oxygen level (e.g., pulse oximetry) 91 to 94%    Protocols used: COVID-19 - Diagnosed or [id number]

## 2025-06-09 NOTE — TELEPHONE ENCOUNTER
Called #   Telephone Information:   Mobile [phone]     RN COVID TREATMENT VISIT  06/09/25      The patient has been triaged and does not require a higher level of care.    Terry Verduzco  79 year old  Current weight?   Wt Readings from Last 2 Encounters:   01/08/25 88.1 kg (194 lb 3.2 oz)   06/17/24 85.2 kg (187 lb 14.4 oz)         Has the patient been seen by a primary care or specialty provider at a [facility] within the past three years? Yes.   Have you been in close proximity to/do you have a known exposure to a person with a confirmed case of influenza? No.     General treatment eligibility:  Date of positive COVID test (PCR or at home)?  6/7/2025  Symptoms started Thursday morning     Are you or have you been hospitalized for this COVID-19 infection? No.   Have you received monoclonal antibodies or antiviral treatment for COVID-19 since this positive test? No.   Do you have any of the following conditions that place you at risk of being very sick from COVID-19?   - Age 50 or older  - Chronic kidney disease (at any stage)  Yes, patient has at least one high risk condition as noted above.     Current COVID symptoms:   - cough  - fatigue  - muscle or body aches  - sore throat  - congestion or runny nose  Yes. Patient has at least one symptom as selected.     How many days since symptoms started? 5 days or less. Established patient, 12 years or older weighing at least 88.2 lbs, who has symptoms that started in the past 5 days, has not been hospitalized nor received treatment already, and is at risk for being very sick from COVID-19.     Treatment eligibility by RN:  Are you currently pregnant or nursing? No  Do you have a clinically significant hypersensitivity to nirmatrelvir or ritonavir, or toxic epidermal necrolysis (TEN) or Fowler-Eran Syndrome? No  Do you have a history of hepatitis, any hepatic impairment on the Problem List (such as Child-Rodriguez Class C, cirrhosis, fatty liver  disease, alcoholic liver disease), or was the last liver lab (hepatic panel, ALT, AST, ALK Phos, bilirubin) elevated in the past 6 months? No  Do you have any history of severe renal impairment (eGFR < 30mL/min)? No    Is patient eligible to continue? Yes, patient meets all eligibility requirements for the RN COVID treatment (as denoted by all no responses above).     Current Outpatient Medications   Medication Sig Dispense Refill    atorvastatin (LIPITOR) 80 MG tablet Take 1 tablet (80 mg) by mouth daily 90 tablet 3    cholecalciferol (VITAMIN D3) 25 mcg (1000 units) capsule Take 1 capsule by mouth daily      fenofibrate (TRIGLIDE/LOFIBRA) 160 MG tablet Take 1 tablet (160 mg) by mouth daily 90 tablet 3    glucosamine-chondroitin 500-400 MG CAPS per capsule Take 1 capsule by mouth daily      lisinopril-hydrochlorothiazide (ZESTORETIC) 20-12.5 MG tablet Take 1 tablet by mouth daily 90 tablet 3    melatonin 5 MG tablet Take 5 mg by mouth nightly as needed for sleep      metoprolol succinate ER (TOPROL XL) 50 MG 24 hr tablet Take 1 tablet (50 mg) by mouth daily 90 tablet 3    multivitamin w/minerals (THERA-VIT-M) tablet Take 1 tablet by mouth daily      tamsulosin (FLOMAX) 0.4 MG capsule Take 1 capsule (0.4 mg) by mouth daily 90 capsule 3    triamcinolone (KENALOG) 0.5 % external ointment Apply to psoriasis twice daily 120 g 3       Medications from List 1 of the standing order (on medications that exclude the use of Paxlovid) that patient is taking: NONE.   Is patient taking any meds from List 1? No.   Medications from List 2 of the standing order (on meds that provider needs to adjust) that patient is taking: NONE. Is patient on any of the meds from List 2? No.   Medications from List 3 of standing order (on meds that a RN needs to adjust) that patient is taking: atorvastatin (Lipitor): Instructed patient to stop atorvastatin while taking Paxlovid and restart atorvastatin 3 days after the completion of Paxlovid.    tamsulosin (Flomax): Instructed patient to stop taking tamsulosin while taking Paxlovid and restart tamsulosin 3 days after the completion of Paxlovid.  Is patient on any meds from List 3? Yes. Patient is on meds from list 3. No meds require a provider visit and at least one med required RN to adjust.     Paxlovid has an approximate 90% reduction in hospitalization. Paxlovid can possibly cause altered sense of taste, diarrhea (loose, watery stools), high blood pressure, muscle aches.     Would patient like a Paxlovid prescription?   Yes.   Lab Results   Component Value Date    GFRESTIMATED 42 (L) 01/08/2025       Was last eGFR reduced? Yes, eGFR 30-59 and will require reduced renal function dose of Paxlovid. Prescription sent to [facility].   [facility] [address] in [place], [place]     Temporary change to home medications: atorvastatin (Lipitor): Instructed patient to stop atorvastatin while taking Paxlovid and restart atorvastatin 3 days after the completion of Paxlovid.   tamsulosin (Flomax): Instructed patient to stop taking tamsulosin while taking Paxlovid and restart tamsulosin 3 days after the completion of Paxlovid.     All medication adjustments (holds, etc) were discussed with the patient and patient was asked to repeat back (teachback) their med adjustment.  Did patient understand med adjustment? Yes, patient repeated back and understood correctly.        Reviewed the following instructions with the patient:    Paxlovid (nimatrelvir and ritonavir)    How it works  Two medicines (nirmatrelvir and ritonavir) are taken together. They stop the virus from growing. Less amount of virus is easier for your body to fight.    How to take  Medicine comes in a daily container with both medicine tablets. Take by mouth twice daily (once in the morning, once at night) for 5 days.  The number of tablets to take varies by patient.  Don't chew or break capsules. Swallow whole.    When to take  Take as soon as  possible after positive COVID-19 test result, and within 5 days of your first symptoms.    Possible side effects  Can cause altered sense of taste, diarrhea (loose, watery stools), high blood pressure, muscle aches.    Harini Lafleur RN

## 2025-07-11 DIAGNOSIS — E78.5 HYPERLIPIDEMIA LDL GOAL <100: ICD-10-CM

## 2025-07-11 DIAGNOSIS — I10 HYPERTENSION GOAL BP (BLOOD PRESSURE) < 140/90: ICD-10-CM

## 2025-07-11 DIAGNOSIS — N18.32 STAGE 3B CHRONIC KIDNEY DISEASE (H): ICD-10-CM

## 2025-07-11 DIAGNOSIS — R33.9 URINARY RETENTION: ICD-10-CM

## 2025-07-11 DIAGNOSIS — R73.03 PREDIABETES: ICD-10-CM

## 2025-07-11 RX ORDER — METOPROLOL SUCCINATE 50 MG/1
50 TABLET, EXTENDED RELEASE ORAL DAILY
Qty: 90 TABLET | Refills: 0 | Status: SHIPPED | OUTPATIENT
Start: 2025-07-11

## 2025-07-11 RX ORDER — TAMSULOSIN HYDROCHLORIDE 0.4 MG/1
CAPSULE ORAL
Qty: 90 CAPSULE | Refills: 0 | Status: SHIPPED | OUTPATIENT
Start: 2025-07-11

## 2025-07-11 RX ORDER — ATORVASTATIN CALCIUM 80 MG/1
80 TABLET, FILM COATED ORAL DAILY
Qty: 90 TABLET | Refills: 0 | Status: SHIPPED | OUTPATIENT
Start: 2025-07-11

## 2025-07-11 RX ORDER — FENOFIBRATE 160 MG/1
TABLET ORAL DAILY
Qty: 90 TABLET | Refills: 0 | Status: SHIPPED | OUTPATIENT
Start: 2025-07-11

## 2025-07-11 RX ORDER — LISINOPRIL AND HYDROCHLOROTHIAZIDE 12.5; 2 MG/1; MG/1
1 TABLET ORAL DAILY
Qty: 90 TABLET | Refills: 0 | Status: SHIPPED | OUTPATIENT
Start: 2025-07-11

## 2025-08-04 SDOH — HEALTH STABILITY: PHYSICAL HEALTH: ON AVERAGE, HOW MANY MINUTES DO YOU ENGAGE IN EXERCISE AT THIS LEVEL?: 40 MIN

## 2025-08-04 SDOH — HEALTH STABILITY: PHYSICAL HEALTH: ON AVERAGE, HOW MANY DAYS PER WEEK DO YOU ENGAGE IN MODERATE TO STRENUOUS EXERCISE (LIKE A BRISK WALK)?: 2 DAYS

## 2025-08-04 ASSESSMENT — ANXIETY QUESTIONNAIRES
5. BEING SO RESTLESS THAT IT IS HARD TO SIT STILL: NOT AT ALL
8. IF YOU CHECKED OFF ANY PROBLEMS, HOW DIFFICULT HAVE THESE MADE IT FOR YOU TO DO YOUR WORK, TAKE CARE OF THINGS AT HOME, OR GET ALONG WITH OTHER PEOPLE?: NOT DIFFICULT AT ALL
GAD7 TOTAL SCORE: 0
6. BECOMING EASILY ANNOYED OR IRRITABLE: NOT AT ALL
2. NOT BEING ABLE TO STOP OR CONTROL WORRYING: NOT AT ALL
7. FEELING AFRAID AS IF SOMETHING AWFUL MIGHT HAPPEN: NOT AT ALL
1. FEELING NERVOUS, ANXIOUS, OR ON EDGE: NOT AT ALL
3. WORRYING TOO MUCH ABOUT DIFFERENT THINGS: NOT AT ALL
IF YOU CHECKED OFF ANY PROBLEMS ON THIS QUESTIONNAIRE, HOW DIFFICULT HAVE THESE PROBLEMS MADE IT FOR YOU TO DO YOUR WORK, TAKE CARE OF THINGS AT HOME, OR GET ALONG WITH OTHER PEOPLE: NOT DIFFICULT AT ALL
7. FEELING AFRAID AS IF SOMETHING AWFUL MIGHT HAPPEN: NOT AT ALL
GAD7 TOTAL SCORE: 0
GAD7 TOTAL SCORE: 0
4. TROUBLE RELAXING: NOT AT ALL

## 2025-08-04 ASSESSMENT — PATIENT HEALTH QUESTIONNAIRE - PHQ9
SUM OF ALL RESPONSES TO PHQ QUESTIONS 1-9: 0
SUM OF ALL RESPONSES TO PHQ QUESTIONS 1-9: 0
10. IF YOU CHECKED OFF ANY PROBLEMS, HOW DIFFICULT HAVE THESE PROBLEMS MADE IT FOR YOU TO DO YOUR WORK, TAKE CARE OF THINGS AT HOME, OR GET ALONG WITH OTHER PEOPLE: NOT DIFFICULT AT ALL

## 2025-08-04 ASSESSMENT — SOCIAL DETERMINANTS OF HEALTH (SDOH): HOW OFTEN DO YOU GET TOGETHER WITH FRIENDS OR RELATIVES?: TWICE A WEEK

## 2025-08-05 ENCOUNTER — OFFICE VISIT (OUTPATIENT)
Dept: FAMILY MEDICINE | Facility: CLINIC | Age: 80
End: 2025-08-05
Attending: FAMILY MEDICINE
Payer: COMMERCIAL

## 2025-08-05 VITALS
HEART RATE: 94 BPM | BODY MASS INDEX: 26.89 KG/M2 | SYSTOLIC BLOOD PRESSURE: 138 MMHG | TEMPERATURE: 97.8 F | RESPIRATION RATE: 16 BRPM | OXYGEN SATURATION: 96 % | WEIGHT: 198.5 LBS | DIASTOLIC BLOOD PRESSURE: 80 MMHG | HEIGHT: 72 IN

## 2025-08-05 DIAGNOSIS — Z12.11 SCREEN FOR COLON CANCER: ICD-10-CM

## 2025-08-05 DIAGNOSIS — Z00.00 MEDICARE ANNUAL WELLNESS VISIT, SUBSEQUENT: ICD-10-CM

## 2025-08-05 DIAGNOSIS — R73.03 PREDIABETES: ICD-10-CM

## 2025-08-05 DIAGNOSIS — I10 HYPERTENSION GOAL BP (BLOOD PRESSURE) < 140/90: ICD-10-CM

## 2025-08-05 DIAGNOSIS — N18.32 STAGE 3B CHRONIC KIDNEY DISEASE (H): ICD-10-CM

## 2025-08-05 DIAGNOSIS — E78.5 HYPERLIPIDEMIA LDL GOAL <100: ICD-10-CM

## 2025-08-05 DIAGNOSIS — Z00.00 ROUTINE GENERAL MEDICAL EXAMINATION AT A HEALTH CARE FACILITY: Primary | ICD-10-CM

## 2025-08-05 DIAGNOSIS — L40.9 PSORIASIS: ICD-10-CM

## 2025-08-05 DIAGNOSIS — Z51.81 MEDICATION MONITORING ENCOUNTER: ICD-10-CM

## 2025-08-05 DIAGNOSIS — Z12.5 SCREENING FOR PROSTATE CANCER: ICD-10-CM

## 2025-08-05 DIAGNOSIS — M15.0 PRIMARY OSTEOARTHRITIS INVOLVING MULTIPLE JOINTS: ICD-10-CM

## 2025-08-05 DIAGNOSIS — R33.9 URINARY RETENTION: ICD-10-CM

## 2025-08-05 LAB
ALBUMIN UR-MCNC: NEGATIVE MG/DL
APPEARANCE UR: CLEAR
BILIRUB UR QL STRIP: NEGATIVE
COLOR UR AUTO: YELLOW
ERYTHROCYTE [DISTWIDTH] IN BLOOD BY AUTOMATED COUNT: 13.7 % (ref 10–15)
EST. AVERAGE GLUCOSE BLD GHB EST-MCNC: 114 MG/DL
GLUCOSE UR STRIP-MCNC: NEGATIVE MG/DL
HBA1C MFR BLD: 5.6 % (ref 0–5.6)
HCT VFR BLD AUTO: 41.6 % (ref 40–53)
HGB BLD-MCNC: 14 G/DL (ref 13.3–17.7)
HGB UR QL STRIP: NEGATIVE
KETONES UR STRIP-MCNC: NEGATIVE MG/DL
LEUKOCYTE ESTERASE UR QL STRIP: NEGATIVE
MCH RBC QN AUTO: 30.6 PG (ref 26.5–33)
MCHC RBC AUTO-ENTMCNC: 33.7 G/DL (ref 31.5–36.5)
MCV RBC AUTO: 91 FL (ref 78–100)
NITRATE UR QL: NEGATIVE
PH UR STRIP: 6 [PH] (ref 5–7)
PLATELET # BLD AUTO: 237 10E3/UL (ref 150–450)
RBC # BLD AUTO: 4.57 10E6/UL (ref 4.4–5.9)
SP GR UR STRIP: 1.01 (ref 1–1.03)
UROBILINOGEN UR STRIP-ACNC: 0.2 E.U./DL
WBC # BLD AUTO: 6.3 10E3/UL (ref 4–11)

## 2025-08-05 RX ORDER — LISINOPRIL AND HYDROCHLOROTHIAZIDE 12.5; 2 MG/1; MG/1
1 TABLET ORAL DAILY
Qty: 90 TABLET | Refills: 3 | Status: SHIPPED | OUTPATIENT
Start: 2025-08-05

## 2025-08-05 RX ORDER — METOPROLOL SUCCINATE 50 MG/1
50 TABLET, EXTENDED RELEASE ORAL DAILY
Qty: 90 TABLET | Refills: 3 | Status: SHIPPED | OUTPATIENT
Start: 2025-08-05

## 2025-08-05 RX ORDER — FENOFIBRATE 160 MG/1
160 TABLET ORAL DAILY
Qty: 90 TABLET | Refills: 3 | Status: SHIPPED | OUTPATIENT
Start: 2025-08-05

## 2025-08-05 RX ORDER — TAMSULOSIN HYDROCHLORIDE 0.4 MG/1
0.4 CAPSULE ORAL DAILY
Qty: 90 CAPSULE | Refills: 3 | Status: SHIPPED | OUTPATIENT
Start: 2025-08-05

## 2025-08-05 RX ORDER — ATORVASTATIN CALCIUM 80 MG/1
80 TABLET, FILM COATED ORAL DAILY
Qty: 90 TABLET | Refills: 3 | Status: SHIPPED | OUTPATIENT
Start: 2025-08-05

## 2025-08-06 LAB
ALBUMIN SERPL BCG-MCNC: 4.4 G/DL (ref 3.5–5.2)
ALP SERPL-CCNC: 49 U/L (ref 40–150)
ALT SERPL W P-5'-P-CCNC: 39 U/L (ref 0–70)
ANION GAP SERPL CALCULATED.3IONS-SCNC: 12 MMOL/L (ref 7–15)
AST SERPL W P-5'-P-CCNC: 62 U/L (ref 0–45)
BILIRUB SERPL-MCNC: 0.6 MG/DL
BUN SERPL-MCNC: 31.5 MG/DL (ref 8–23)
CALCIUM SERPL-MCNC: 10.3 MG/DL (ref 8.8–10.4)
CHLORIDE SERPL-SCNC: 103 MMOL/L (ref 98–107)
CHOLEST SERPL-MCNC: 194 MG/DL
CK SERPL-CCNC: 494 U/L (ref 39–308)
CREAT SERPL-MCNC: 1.56 MG/DL (ref 0.67–1.17)
CREAT UR-MCNC: 30.8 MG/DL
CYSTATIN C (ROCHE): 1.7 MG/L (ref 0.6–1)
EGFRCR SERPLBLD CKD-EPI 2021: 45 ML/MIN/1.73M2
FASTING STATUS PATIENT QL REPORTED: YES
FASTING STATUS PATIENT QL REPORTED: YES
GFR/BSA.PRED SERPLBLD CYS-BASED-ARV: 36 ML/MIN/1.73M2
GLUCOSE SERPL-MCNC: 118 MG/DL (ref 70–99)
HCO3 SERPL-SCNC: 23 MMOL/L (ref 22–29)
HDLC SERPL-MCNC: 43 MG/DL
LDLC SERPL CALC-MCNC: 127 MG/DL
MICROALBUMIN UR-MCNC: <12 MG/L
MICROALBUMIN/CREAT UR: NORMAL MG/G{CREAT}
NONHDLC SERPL-MCNC: 151 MG/DL
POTASSIUM SERPL-SCNC: 4.5 MMOL/L (ref 3.4–5.3)
PROT SERPL-MCNC: 7.6 G/DL (ref 6.4–8.3)
PSA SERPL DL<=0.01 NG/ML-MCNC: 3 NG/ML (ref 0–6.5)
SODIUM SERPL-SCNC: 138 MMOL/L (ref 135–145)
TRIGL SERPL-MCNC: 122 MG/DL
TSH SERPL DL<=0.005 MIU/L-ACNC: 1.16 UIU/ML (ref 0.3–4.2)

## 2025-08-18 LAB — HEMOCCULT STL QL IA: NEGATIVE

## 2025-09-03 ENCOUNTER — LAB (OUTPATIENT)
Dept: LAB | Facility: CLINIC | Age: 80
End: 2025-09-03
Payer: COMMERCIAL

## 2025-09-03 DIAGNOSIS — E83.39 HYPOPHOSPHATEMIA: ICD-10-CM

## 2025-09-03 DIAGNOSIS — N18.32 CHRONIC KIDNEY DISEASE, STAGE 3B (H): ICD-10-CM

## 2025-09-03 DIAGNOSIS — R74.8 ELEVATED CK: ICD-10-CM

## 2025-09-03 PROCEDURE — 83521 IG LIGHT CHAINS FREE EACH: CPT

## 2025-09-03 PROCEDURE — 83970 ASSAY OF PARATHORMONE: CPT

## 2025-09-03 PROCEDURE — 36415 COLL VENOUS BLD VENIPUNCTURE: CPT

## 2025-09-03 PROCEDURE — 82550 ASSAY OF CK (CPK): CPT

## 2025-09-04 LAB
CK SERPL-CCNC: 118 U/L (ref 39–308)
KAPPA LC FREE SER-MCNC: 3.33 MG/DL (ref 0.33–1.94)
KAPPA LC FREE/LAMBDA FREE SER NEPH: 1.45 {RATIO} (ref 0.26–1.65)
LAMBDA LC FREE SERPL-MCNC: 2.29 MG/DL (ref 0.57–2.63)
PTH-INTACT SERPL-MCNC: 22 PG/ML (ref 15–65)

## (undated) DEVICE — LINEN HALF SHEET 5512

## (undated) DEVICE — PROTECTOR ARM ONE-STEP TRENDELENBURG 40418

## (undated) DEVICE — ESU GROUND PAD ADULT W/CORD E7507

## (undated) DEVICE — CATH TRAY FOLEY COUDE SURESTEP 16FR W/DRN BAG LATEX A304416A

## (undated) DEVICE — LINEN FULL SHEET 5511

## (undated) DEVICE — DAVINCI XI OBTURATOR BLADELESS 8MM 470359

## (undated) DEVICE — DECANTER VIAL 2006S

## (undated) DEVICE — ENDO TROCAR FIRST ENTRY KII FIOS Z-THRD 05X100MM CTF03

## (undated) DEVICE — BLADE KNIFE SURG 11 371111

## (undated) DEVICE — DAVINCI HOT SHEARS TIP COVER  400180

## (undated) DEVICE — LINEN TOWEL PACK X10 5473

## (undated) DEVICE — ESU LIGASURE LAPAROSCOPIC BLUNT TIP SEALER 5MMX37CM LF1837

## (undated) DEVICE — PREP CHLORAPREP 26ML TINTED HI-LITE ORANGE 930815

## (undated) DEVICE — LIGHT HANDLE X2

## (undated) DEVICE — GLOVE PROTEXIS BLUE W/NEU-THERA 8.0  2D73EB80

## (undated) DEVICE — DAVINCI XI DRAPE COLUMN 470341

## (undated) DEVICE — LINEN ORTHO ACL PACK 5447

## (undated) DEVICE — BAG CLEAR TRASH 1.3M 39X33" P4040C

## (undated) DEVICE — STPL POWERED ECHELON 45MM PSEE45A

## (undated) DEVICE — SU VICRYL 0 UR-6 27" J603H

## (undated) DEVICE — SYSTEM LAPAROVUE VISIBILITY LAPVUE10

## (undated) DEVICE — GLOVE PROTEXIS POWDER FREE 7.5 ORTHOPEDIC 2D73ET75

## (undated) DEVICE — SOL NACL 0.9% IRRIG 1000ML BOTTLE 2F7124

## (undated) DEVICE — SU VICRYL 4-0 PS-2 18" UND J496H

## (undated) DEVICE — DAVINCI XI DRAPE ARM 470015

## (undated) DEVICE — ESU CORD MONOPOLAR 10'  E0510

## (undated) DEVICE — ENDO TROCAR SLEEVE KII Z-THREADED 05X100MM CTS02

## (undated) DEVICE — LINEN POUCH DBL 5427

## (undated) DEVICE — Device

## (undated) DEVICE — GOWN IMPERVIOUS ZONED XLG 9041

## (undated) DEVICE — BLADE CLIPPER 3M 9670

## (undated) DEVICE — ENDO TROCAR BLUNT TIP KII BALLOON 12X100MM C0R47

## (undated) DEVICE — GOWN IMPERVIOUS SPECIALTY XLG/XLONG 32474

## (undated) DEVICE — ADH SKIN CLOSURE PREMIERPRO EXOFIN MICOR HV 0.5ML 3471

## (undated) DEVICE — SOL WATER IRRIG 1000ML BOTTLE 2F7114

## (undated) DEVICE — ENDO POUCH UNIV RETRIEVAL SYSTEM INZII 10MM CD001

## (undated) DEVICE — SU WND CLOSURE VLOC 90 ABS 3-0 VIOLET 6" CV-23 VLOCM0804

## (undated) DEVICE — DRSG GAUZE 4X4" 8044

## (undated) DEVICE — LUBRICANT INST KIT ENDO-LUBE 220-90

## (undated) DEVICE — DAVINCI XI SEAL UNIVERSAL 5-8MM 470361

## (undated) DEVICE — ESU PENCIL W/HOLSTER E2350H

## (undated) RX ORDER — CEFAZOLIN SODIUM/WATER 2 G/20 ML
SYRINGE (ML) INTRAVENOUS
Status: DISPENSED
Start: 2022-06-24

## (undated) RX ORDER — PROPOFOL 10 MG/ML
INJECTION, EMULSION INTRAVENOUS
Status: DISPENSED
Start: 2022-06-24

## (undated) RX ORDER — PROPOFOL 10 MG/ML
INJECTION, EMULSION INTRAVENOUS
Status: DISPENSED
Start: 2022-07-20

## (undated) RX ORDER — LIDOCAINE HYDROCHLORIDE 10 MG/ML
INJECTION, SOLUTION EPIDURAL; INFILTRATION; INTRACAUDAL; PERINEURAL
Status: DISPENSED
Start: 2022-07-20

## (undated) RX ORDER — NEOSTIGMINE METHYLSULFATE 1 MG/ML
VIAL (ML) INJECTION
Status: DISPENSED
Start: 2022-06-24

## (undated) RX ORDER — FENTANYL CITRATE-0.9 % NACL/PF 10 MCG/ML
PLASTIC BAG, INJECTION (ML) INTRAVENOUS
Status: DISPENSED
Start: 2022-06-24

## (undated) RX ORDER — ONDANSETRON 2 MG/ML
INJECTION INTRAMUSCULAR; INTRAVENOUS
Status: DISPENSED
Start: 2022-06-24

## (undated) RX ORDER — DEXAMETHASONE SODIUM PHOSPHATE 4 MG/ML
INJECTION, SOLUTION INTRA-ARTICULAR; INTRALESIONAL; INTRAMUSCULAR; INTRAVENOUS; SOFT TISSUE
Status: DISPENSED
Start: 2022-06-24

## (undated) RX ORDER — FENTANYL CITRATE 50 UG/ML
INJECTION, SOLUTION INTRAMUSCULAR; INTRAVENOUS
Status: DISPENSED
Start: 2022-07-20

## (undated) RX ORDER — HYDROCODONE BITARTRATE AND ACETAMINOPHEN 5; 325 MG/1; MG/1
TABLET ORAL
Status: DISPENSED
Start: 2022-06-24

## (undated) RX ORDER — GLYCOPYRROLATE 0.2 MG/ML
INJECTION INTRAMUSCULAR; INTRAVENOUS
Status: DISPENSED
Start: 2022-07-20

## (undated) RX ORDER — GLYCOPYRROLATE 0.2 MG/ML
INJECTION, SOLUTION INTRAMUSCULAR; INTRAVENOUS
Status: DISPENSED
Start: 2022-06-24

## (undated) RX ORDER — LIDOCAINE HYDROCHLORIDE 10 MG/ML
INJECTION, SOLUTION EPIDURAL; INFILTRATION; INTRACAUDAL; PERINEURAL
Status: DISPENSED
Start: 2022-06-24

## (undated) RX ORDER — HYDROCODONE BITARTRATE AND ACETAMINOPHEN 5; 325 MG/1; MG/1
TABLET ORAL
Status: DISPENSED
Start: 2022-07-20

## (undated) RX ORDER — BUPIVACAINE HYDROCHLORIDE 5 MG/ML
INJECTION, SOLUTION EPIDURAL; INTRACAUDAL
Status: DISPENSED
Start: 2022-06-24

## (undated) RX ORDER — NEOSTIGMINE METHYLSULFATE 1 MG/ML
VIAL (ML) INJECTION
Status: DISPENSED
Start: 2022-07-20

## (undated) RX ORDER — METHYLPREDNISOLONE SODIUM SUCCINATE 125 MG/2ML
INJECTION, POWDER, LYOPHILIZED, FOR SOLUTION INTRAMUSCULAR; INTRAVENOUS
Status: DISPENSED
Start: 2022-07-20

## (undated) RX ORDER — FENTANYL CITRATE 50 UG/ML
INJECTION, SOLUTION INTRAMUSCULAR; INTRAVENOUS
Status: DISPENSED
Start: 2022-06-24

## (undated) RX ORDER — BUPIVACAINE HYDROCHLORIDE 5 MG/ML
INJECTION, SOLUTION EPIDURAL; INTRACAUDAL
Status: DISPENSED
Start: 2022-07-20

## (undated) RX ORDER — FENTANYL CITRATE-0.9 % NACL/PF 10 MCG/ML
PLASTIC BAG, INJECTION (ML) INTRAVENOUS
Status: DISPENSED
Start: 2022-07-20

## (undated) RX ORDER — CEFAZOLIN SODIUM 1 G/3ML
INJECTION, POWDER, FOR SOLUTION INTRAMUSCULAR; INTRAVENOUS
Status: DISPENSED
Start: 2022-07-20